# Patient Record
Sex: MALE | Race: WHITE | Employment: FULL TIME | URBAN - METROPOLITAN AREA
[De-identification: names, ages, dates, MRNs, and addresses within clinical notes are randomized per-mention and may not be internally consistent; named-entity substitution may affect disease eponyms.]

---

## 2017-12-13 ENCOUNTER — TELEPHONE (OUTPATIENT)
Dept: FAMILY MEDICINE CLINIC | Facility: CLINIC | Age: 57
End: 2017-12-13

## 2017-12-13 NOTE — TELEPHONE ENCOUNTER
these patients assigned to us in past with [facility], but never seen, and no longer [facility].  Please remove from our list

## 2018-01-23 ENCOUNTER — TELEPHONE (OUTPATIENT)
Dept: FAMILY MEDICINE CLINIC | Facility: CLINIC | Age: 58
End: 2018-01-23

## 2018-01-23 NOTE — TELEPHONE ENCOUNTER
Pt assigned to Dr Henrietta Mera but has never been seen here. Letter mailed for pt to call,confirm and schedule an appointment if Dr Henrietta Mera will be his Primary Care Physician

## 2018-03-28 ENCOUNTER — TELEPHONE (OUTPATIENT)
Dept: FAMILY MEDICINE CLINIC | Facility: CLINIC | Age: 58
End: 2018-03-28

## 2018-03-28 NOTE — TELEPHONE ENCOUNTER
Pt has never been seen here but on assigned to Dr Kenisha Abraham. Eiml Beard LMOM for pt to confirm if she will continue care with Dr Kenisha Abraham (ON ELIGIBILITY LIST)

## 2018-04-13 NOTE — TELEPHONE ENCOUNTER
Front staff called pt and LM on 3/28/18. Called pt again and left message to call our office to schedule an apptment with Dr. Sulaiman Peterson. Will mail letter to pt.

## 2018-06-13 ENCOUNTER — OFFICE VISIT (OUTPATIENT)
Dept: FAMILY MEDICINE CLINIC | Facility: CLINIC | Age: 58
End: 2018-06-13

## 2018-06-13 VITALS
SYSTOLIC BLOOD PRESSURE: 130 MMHG | HEART RATE: 84 BPM | TEMPERATURE: 98 F | HEIGHT: 70 IN | RESPIRATION RATE: 16 BRPM | BODY MASS INDEX: 34.07 KG/M2 | OXYGEN SATURATION: 99 % | DIASTOLIC BLOOD PRESSURE: 94 MMHG | WEIGHT: 238 LBS

## 2018-06-13 DIAGNOSIS — Z13.89 SCREENING FOR GENITOURINARY CONDITION: ICD-10-CM

## 2018-06-13 DIAGNOSIS — E65 HUMP BEHIND THE SHOULDERS: ICD-10-CM

## 2018-06-13 DIAGNOSIS — Z12.5 SCREENING FOR PROSTATE CANCER: ICD-10-CM

## 2018-06-13 DIAGNOSIS — I25.10 CORONARY ARTERY DISEASE INVOLVING NATIVE CORONARY ARTERY OF NATIVE HEART WITHOUT ANGINA PECTORIS: ICD-10-CM

## 2018-06-13 DIAGNOSIS — Z12.11 SCREENING FOR COLON CANCER: ICD-10-CM

## 2018-06-13 DIAGNOSIS — Z00.00 ROUTINE GENERAL MEDICAL EXAMINATION AT HEALTH CARE FACILITY: Primary | ICD-10-CM

## 2018-06-13 PROCEDURE — 90471 IMMUNIZATION ADMIN: CPT | Performed by: FAMILY MEDICINE

## 2018-06-13 PROCEDURE — 99386 PREV VISIT NEW AGE 40-64: CPT | Performed by: FAMILY MEDICINE

## 2018-06-13 PROCEDURE — 90715 TDAP VACCINE 7 YRS/> IM: CPT | Performed by: FAMILY MEDICINE

## 2018-06-13 NOTE — PATIENT INSTRUCTIONS
Do fasting blood work. Call cardiologist Dr. Michelle Schultz for evaluation of Dr. Kay Doe. Call GI Dr. Haim Kumar for colonoscopy. Healthy diet. Stay active.

## 2018-06-14 NOTE — PROGRESS NOTES
Leopold Cobbs is a 60year old male who presents for a complete physical exam.   HPI:   Pt  is new in our office he is here to establish care. Patient's daughter and wife are patient's in our practice. Patient had heart attack in 2004.   Had quadruple b Smokeless tobacco: Never Used                      Alcohol use: No               Occ: . . : yes. Children: 1 daughter.   Exercise: minimal.  Diet: watches fats closely     REVIEW OF SYSTEMS:   GENERAL: feels well otherwise, obese cancer  Screening for prostate cancer  Coronary artery disease involving native coronary artery of native heart without angina pectoris  Hump behind the shoulders      Orders Placed This Encounter      CBC W Differential W Platelet [E]      Comp Metabolic

## 2018-06-23 ENCOUNTER — PRIOR ORIGINAL RECORDS (OUTPATIENT)
Dept: OTHER | Age: 58
End: 2018-06-23

## 2018-06-23 ENCOUNTER — LAB ENCOUNTER (OUTPATIENT)
Dept: LAB | Age: 58
End: 2018-06-23
Attending: FAMILY MEDICINE
Payer: COMMERCIAL

## 2018-06-23 DIAGNOSIS — Z13.89 SCREENING FOR GENITOURINARY CONDITION: ICD-10-CM

## 2018-06-23 DIAGNOSIS — Z12.5 SCREENING FOR PROSTATE CANCER: ICD-10-CM

## 2018-06-23 DIAGNOSIS — E65 HUMP BEHIND THE SHOULDERS: ICD-10-CM

## 2018-06-23 DIAGNOSIS — Z00.00 ROUTINE GENERAL MEDICAL EXAMINATION AT HEALTH CARE FACILITY: ICD-10-CM

## 2018-06-23 DIAGNOSIS — R73.9 HYPERGLYCEMIA: ICD-10-CM

## 2018-06-23 PROCEDURE — 85025 COMPLETE CBC W/AUTO DIFF WBC: CPT

## 2018-06-23 PROCEDURE — 83036 HEMOGLOBIN GLYCOSYLATED A1C: CPT

## 2018-06-23 PROCEDURE — 80053 COMPREHEN METABOLIC PANEL: CPT

## 2018-06-23 PROCEDURE — 84443 ASSAY THYROID STIM HORMONE: CPT

## 2018-06-23 PROCEDURE — 36415 COLL VENOUS BLD VENIPUNCTURE: CPT

## 2018-06-23 PROCEDURE — 82306 VITAMIN D 25 HYDROXY: CPT

## 2018-06-23 PROCEDURE — 80061 LIPID PANEL: CPT

## 2018-06-23 PROCEDURE — 82533 TOTAL CORTISOL: CPT

## 2018-06-23 PROCEDURE — 81001 URINALYSIS AUTO W/SCOPE: CPT

## 2018-06-24 DIAGNOSIS — R73.9 HYPERGLYCEMIA: Primary | ICD-10-CM

## 2018-06-25 ENCOUNTER — TELEPHONE (OUTPATIENT)
Dept: FAMILY MEDICINE CLINIC | Facility: CLINIC | Age: 58
End: 2018-06-25

## 2018-06-25 DIAGNOSIS — E87.5 HYPERKALEMIA: Primary | ICD-10-CM

## 2018-06-27 ENCOUNTER — APPOINTMENT (OUTPATIENT)
Dept: LAB | Age: 58
End: 2018-06-27
Attending: FAMILY MEDICINE
Payer: COMMERCIAL

## 2018-06-27 ENCOUNTER — PRIOR ORIGINAL RECORDS (OUTPATIENT)
Dept: OTHER | Age: 58
End: 2018-06-27

## 2018-06-27 DIAGNOSIS — E87.5 HYPERKALEMIA: ICD-10-CM

## 2018-06-27 LAB
BUN BLD-MCNC: 19 MG/DL (ref 8–20)
CALCIUM BLD-MCNC: 9.5 MG/DL (ref 8.3–10.3)
CHLORIDE: 107 MMOL/L (ref 101–111)
CO2: 27 MMOL/L (ref 22–32)
CREAT BLD-MCNC: 0.89 MG/DL (ref 0.7–1.3)
GLUCOSE BLD-MCNC: 86 MG/DL (ref 70–99)
POTASSIUM SERPL-SCNC: 4.5 MMOL/L (ref 3.6–5.1)
SODIUM SERPL-SCNC: 143 MMOL/L (ref 136–144)

## 2018-06-27 PROCEDURE — 36415 COLL VENOUS BLD VENIPUNCTURE: CPT

## 2018-06-27 PROCEDURE — 80048 BASIC METABOLIC PNL TOTAL CA: CPT

## 2018-07-16 ENCOUNTER — TELEPHONE (OUTPATIENT)
Dept: FAMILY MEDICINE CLINIC | Facility: CLINIC | Age: 58
End: 2018-07-16

## 2018-07-16 DIAGNOSIS — I25.10 ATHEROSCLEROSIS OF NATIVE CORONARY ARTERY WITHOUT ANGINA PECTORIS, UNSPECIFIED WHETHER NATIVE OR TRANSPLANTED HEART: Primary | ICD-10-CM

## 2018-07-16 NOTE — TELEPHONE ENCOUNTER
REFERRAL for Cardio for Dr. Hank Canavan DOB 1960    Reason for the order/referral:Follow Up   PCP: Adin Berkowitz   Refer to [id number] [facility]   Specialty:Cardiologist   Patient Insurance: Payor: [facility] MAYO/WILL / Plan:  [facility] / [employer]

## 2018-07-17 PROCEDURE — 88305 TISSUE EXAM BY PATHOLOGIST: CPT | Performed by: INTERNAL MEDICINE

## 2018-07-18 ENCOUNTER — LAB REQUISITION (OUTPATIENT)
Dept: LAB | Facility: HOSPITAL | Age: 58
End: 2018-07-18
Payer: COMMERCIAL

## 2018-07-18 DIAGNOSIS — D12.5 BENIGN NEOPLASM OF SIGMOID COLON: ICD-10-CM

## 2018-07-18 DIAGNOSIS — D12.3 BENIGN NEOPLASM OF TRANSVERSE COLON: ICD-10-CM

## 2018-07-18 DIAGNOSIS — Z12.11 ENCOUNTER FOR SCREENING FOR MALIGNANT NEOPLASM OF COLON: ICD-10-CM

## 2018-07-25 ENCOUNTER — PRIOR ORIGINAL RECORDS (OUTPATIENT)
Dept: OTHER | Age: 58
End: 2018-07-25

## 2018-07-25 ENCOUNTER — MYAURORA ACCOUNT LINK (OUTPATIENT)
Dept: OTHER | Age: 58
End: 2018-07-25

## 2018-08-07 LAB
ALBUMIN: 3.6 G/DL
ALKALINE PHOSPHATATE(ALK PHOS): 84 IU/L
ALT (SGPT): 43 U/L
AST (SGOT): 25 U/L
BILIRUBIN TOTAL: 0.5 MG/DL
BUN: 18 MG/DL
BUN: 19 MG/DL
CALCIUM: 9.3 MG/DL
CALCIUM: 9.5 MG/DL
CHLORIDE: 107 MEQ/L
CHLORIDE: 111 MEQ/L
CHOLESTEROL, TOTAL: 140 MG/DL
CREATININE, SERUM: 0.87 MG/DL
CREATININE, SERUM: 0.89 MG/DL
GLUCOSE: 110 MG/DL
GLUCOSE: 110 MG/DL
GLUCOSE: 86 MG/DL
HDL CHOLESTEROL: 35 MG/DL
HEMOGLOBIN A1C: 6 %
LDL CHOLESTEROL: 78 MG/DL
POTASSIUM, SERUM: 4.5 MEQ/L
POTASSIUM, SERUM: 5.5 MEQ/L
PROTEIN, TOTAL: 7.3 G/DL
SGOT (AST): 25 IU/L
SGPT (ALT): 43 IU/L
SODIUM: 141 MEQ/L
SODIUM: 143 MEQ/L
THYROID STIMULATING HORMONE: 0.79 MLU/L
TRIGLYCERIDES: 134 MG/DL
VITAMIN D 25-OH: 21.3 NG/ML

## 2019-02-28 VITALS
SYSTOLIC BLOOD PRESSURE: 122 MMHG | DIASTOLIC BLOOD PRESSURE: 70 MMHG | HEART RATE: 90 BPM | WEIGHT: 233 LBS | HEIGHT: 70 IN | BODY MASS INDEX: 33.36 KG/M2

## 2019-04-09 RX ORDER — ROSUVASTATIN CALCIUM 20 MG/1
TABLET, COATED ORAL
COMMUNITY
Start: 2018-07-25 | End: 2020-03-10 | Stop reason: SDUPTHER

## 2019-04-09 RX ORDER — ATORVASTATIN CALCIUM 80 MG/1
1 TABLET, FILM COATED ORAL DAILY
COMMUNITY
Start: 2018-07-25 | End: 2020-03-10 | Stop reason: ALTCHOICE

## 2019-11-06 ENCOUNTER — OFFICE VISIT (OUTPATIENT)
Dept: CARDIOLOGY | Age: 59
End: 2019-11-06

## 2019-11-06 VITALS
BODY MASS INDEX: 32.21 KG/M2 | WEIGHT: 225 LBS | HEART RATE: 70 BPM | SYSTOLIC BLOOD PRESSURE: 120 MMHG | DIASTOLIC BLOOD PRESSURE: 76 MMHG | HEIGHT: 70 IN

## 2019-11-06 DIAGNOSIS — I25.810 CORONARY ARTERY DISEASE INVOLVING CORONARY BYPASS GRAFT OF NATIVE HEART WITHOUT ANGINA PECTORIS: Primary | ICD-10-CM

## 2019-11-06 DIAGNOSIS — Z95.1 HX OF CABG: ICD-10-CM

## 2019-11-06 DIAGNOSIS — E78.2 HYPERLIPIDEMIA, MIXED: ICD-10-CM

## 2019-11-06 PROCEDURE — 99213 OFFICE O/P EST LOW 20 MIN: CPT | Performed by: INTERNAL MEDICINE

## 2019-11-11 ENCOUNTER — MED REC SCAN ONLY (OUTPATIENT)
Dept: FAMILY MEDICINE CLINIC | Facility: CLINIC | Age: 59
End: 2019-11-11

## 2019-11-12 ENCOUNTER — TELEPHONE (OUTPATIENT)
Dept: FAMILY MEDICINE CLINIC | Facility: CLINIC | Age: 59
End: 2019-11-12

## 2019-11-12 DIAGNOSIS — I25.10 ATHEROSCLEROSIS OF NATIVE CORONARY ARTERY WITHOUT ANGINA PECTORIS, UNSPECIFIED WHETHER NATIVE OR TRANSPLANTED HEART: Primary | ICD-10-CM

## 2019-11-12 NOTE — TELEPHONE ENCOUNTER
PT LAST OV ON 6/13/18 AS A NEW PATIENT      Referral to Yobany Owen Cardiologist (INTERNAL)    Reason for the order/referral: referral   PCP:  Dr Jia Zhou   Refer to Provider:  Dr Yobany Owen   Specialty: cardio   Patient Insurance: Payor: [facility]

## 2020-01-14 ENCOUNTER — TELEPHONE (OUTPATIENT)
Dept: CARDIOLOGY | Age: 60
End: 2020-01-14

## 2020-01-14 LAB
ALBUMIN SERPL-MCNC: 3.8 G/DL (ref 3.6–5.1)
ALBUMIN/GLOB SERPL: 1.7 (CALC) (ref 1–2.5)
ALP SERPL-CCNC: 79 U/L (ref 40–115)
ALT SERPL-CCNC: 23 U/L (ref 9–46)
AST SERPL-CCNC: 20 U/L (ref 10–35)
BILIRUB SERPL-MCNC: 0.5 MG/DL (ref 0.2–1.2)
BUN SERPL-MCNC: 19 MG/DL (ref 7–25)
BUN/CREAT SERPL: ABNORMAL (CALC) (ref 6–22)
CALCIUM SERPL-MCNC: 8.8 MG/DL (ref 8.6–10.3)
CHLORIDE SERPL-SCNC: 109 MMOL/L (ref 98–110)
CHOLEST SERPL-MCNC: 117 MG/DL
CHOLEST/HDLC SERPL: 3.1 (CALC)
CO2 SERPL-SCNC: 23 MMOL/L (ref 20–32)
CREAT SERPL-MCNC: 0.75 MG/DL (ref 0.7–1.25)
ERYTHROCYTE [DISTWIDTH] IN BLOOD BY AUTOMATED COUNT: 14 % (ref 11–15)
GFRSERPLBLD MDRD-ARVRAT: 100 ML/MIN/1.73M2
GLOBULIN SER CALC-MCNC: 2.3 G/DL (CALC) (ref 1.9–3.7)
GLUCOSE SERPL-MCNC: 124 MG/DL (ref 65–99)
HBA1C MFR BLD: 5.8 % OF TOTAL HGB
HCT VFR BLD AUTO: 44.8 % (ref 38.5–50)
HDLC SERPL-MCNC: 38 MG/DL
HGB BLD-MCNC: 15.1 G/DL (ref 13.2–17.1)
LDLC SERPL CALC-MCNC: 61 MG/DL (CALC)
MCH RBC QN AUTO: 30 PG (ref 27–33)
MCHC RBC AUTO-ENTMCNC: 33.7 G/DL (ref 32–36)
MCV RBC AUTO: 89.1 FL (ref 80–100)
NONHDLC SERPL-MCNC: 79 MG/DL (CALC)
PLATELET # BLD AUTO: 273 THOUSAND/UL (ref 140–400)
PMV BLD REES-ECKER: 10.7 FL (ref 7.5–12.5)
POTASSIUM SERPL-SCNC: 4.1 MMOL/L (ref 3.5–5.3)
PROT SERPL-MCNC: 6.1 G/DL (ref 6.1–8.1)
RBC # BLD AUTO: 5.03 MILLION/UL (ref 4.2–5.8)
SODIUM SERPL-SCNC: 140 MMOL/L (ref 135–146)
T4 SERPL-MCNC: 7 MCG/DL (ref 4.9–10.5)
TRIGL SERPL-MCNC: 99 MG/DL
TSH SERPL-ACNC: 1.32 MIU/L (ref 0.4–4.5)
WBC # BLD AUTO: 5.5 THOUSAND/UL (ref 3.8–10.8)

## 2020-03-10 ENCOUNTER — TELEPHONE (OUTPATIENT)
Dept: CARDIOLOGY | Age: 60
End: 2020-03-10

## 2020-03-10 RX ORDER — ROSUVASTATIN CALCIUM 20 MG/1
20 TABLET, COATED ORAL DAILY
Qty: 90 TABLET | Refills: 3 | Status: SHIPPED | OUTPATIENT
Start: 2020-03-10

## 2020-10-28 ENCOUNTER — OFFICE VISIT (OUTPATIENT)
Dept: CARDIOLOGY | Age: 60
End: 2020-10-28

## 2020-10-28 VITALS
HEART RATE: 84 BPM | BODY MASS INDEX: 30.8 KG/M2 | DIASTOLIC BLOOD PRESSURE: 84 MMHG | HEIGHT: 71 IN | WEIGHT: 220 LBS | SYSTOLIC BLOOD PRESSURE: 126 MMHG

## 2020-10-28 DIAGNOSIS — Z95.1 HX OF CABG: ICD-10-CM

## 2020-10-28 DIAGNOSIS — E78.2 HYPERLIPIDEMIA, MIXED: ICD-10-CM

## 2020-10-28 DIAGNOSIS — I25.810 CORONARY ARTERY DISEASE INVOLVING CORONARY BYPASS GRAFT OF NATIVE HEART WITHOUT ANGINA PECTORIS: Primary | ICD-10-CM

## 2020-10-28 PROCEDURE — 99214 OFFICE O/P EST MOD 30 MIN: CPT | Performed by: INTERNAL MEDICINE

## 2020-10-28 SDOH — HEALTH STABILITY: PHYSICAL HEALTH: ON AVERAGE, HOW MANY DAYS PER WEEK DO YOU ENGAGE IN MODERATE TO STRENUOUS EXERCISE (LIKE A BRISK WALK)?: 3 DAYS

## 2020-10-28 SDOH — HEALTH STABILITY: PHYSICAL HEALTH: ON AVERAGE, HOW MANY MINUTES DO YOU ENGAGE IN EXERCISE AT THIS LEVEL?: 40 MIN

## 2020-10-28 ASSESSMENT — ENCOUNTER SYMPTOMS
ALLERGIC/IMMUNOLOGIC COMMENTS: NO NEW FOOD ALLERGIES
BRUISES/BLEEDS EASILY: 0
WEIGHT GAIN: 0
NERVOUS/ANXIOUS: 1
SUSPICIOUS LESIONS: 0
CHILLS: 0
HEMOPTYSIS: 0
FEVER: 0
COUGH: 0
HEMATOCHEZIA: 0
WEIGHT LOSS: 0

## 2020-10-29 ENCOUNTER — TELEPHONE (OUTPATIENT)
Dept: CARDIOLOGY | Age: 60
End: 2020-10-29

## 2020-11-04 ENCOUNTER — TELEPHONE (OUTPATIENT)
Dept: FAMILY MEDICINE CLINIC | Facility: CLINIC | Age: 60
End: 2020-11-04

## 2020-11-04 NOTE — TELEPHONE ENCOUNTER
Referral to Marisela Arango, Cardiologist (INTERNAL)    PCP:  Yasir Haq   Refer to Provider: Marisela Arango   Specialty: cardio   Patient Insurance: [facility]   Duration/Summary of Symptoms:   Is this a result of an injury: (Y/N)   Location of pain:

## 2021-04-21 ENCOUNTER — HOSPITAL ENCOUNTER (OUTPATIENT)
Age: 61
Discharge: HOME | End: 2021-04-21
Payer: COMMERCIAL

## 2021-04-21 ENCOUNTER — TELEPHONE (OUTPATIENT)
Dept: FAMILY MEDICINE CLINIC | Facility: CLINIC | Age: 61
End: 2021-04-21

## 2021-04-21 VITALS
HEART RATE: 78 BPM | DIASTOLIC BLOOD PRESSURE: 89 MMHG | OXYGEN SATURATION: 96 % | WEIGHT: 210 LBS | BODY MASS INDEX: 29.4 KG/M2 | RESPIRATION RATE: 16 BRPM | HEIGHT: 70.87 IN | SYSTOLIC BLOOD PRESSURE: 120 MMHG | TEMPERATURE: 97 F

## 2021-04-21 DIAGNOSIS — J03.90 TONSILLITIS: Primary | ICD-10-CM

## 2021-04-21 PROCEDURE — 99203 OFFICE O/P NEW LOW 30 MIN: CPT

## 2021-04-21 PROCEDURE — 99214 OFFICE O/P EST MOD 30 MIN: CPT

## 2021-04-21 PROCEDURE — 87081 CULTURE SCREEN ONLY: CPT | Performed by: NURSE PRACTITIONER

## 2021-04-21 PROCEDURE — 87880 STREP A ASSAY W/OPTIC: CPT | Performed by: NURSE PRACTITIONER

## 2021-04-21 RX ORDER — ASPIRIN 81 MG/1
81 TABLET ORAL DAILY
COMMUNITY

## 2021-04-21 NOTE — ED PROVIDER NOTES
Patient Seen in: [facility]      History   Patient presents with:  Sore Throat    Stated Complaint: Throat & Fatigue    HPI/Subjective:   HPI  78-year-old male presents to immediate care complaining of sore throat feeling tired since [facility] Rate and Rhythm: Normal rate and regular rhythm. Heart sounds: Normal heart sounds. Pulmonary:      Effort: Pulmonary effort is normal.      Breath sounds: Normal breath sounds. Skin:     General: Skin is warm and dry.       Capillary Refill: Capil

## 2021-04-21 NOTE — TELEPHONE ENCOUNTER
Vipin Love N/PSR sts pt just walked in of stating has sore throat, weak, no energy. Following ofc protocol, kamla directed pt to return to his car and advised will have triage nurse call him now. Pt objects stating \"just have the flu. \" kamla pichardo

## 2021-04-24 NOTE — ED NOTES
Attempted to contact the patient but was unsuccessful.  Left a message for the patient to call back RE: Negative strep test.

## 2021-04-24 NOTE — ED NOTES
Patient called back and was notified of the negative strep test. He verbalizes understanding, denies any questions, problems, or concerns, and was in agreement with the information provided.

## 2021-07-22 ENCOUNTER — OFFICE VISIT (OUTPATIENT)
Dept: FAMILY MEDICINE CLINIC | Facility: CLINIC | Age: 61
End: 2021-07-22

## 2021-07-22 VITALS
HEIGHT: 71 IN | RESPIRATION RATE: 17 BRPM | HEART RATE: 90 BPM | DIASTOLIC BLOOD PRESSURE: 68 MMHG | WEIGHT: 229 LBS | SYSTOLIC BLOOD PRESSURE: 120 MMHG | OXYGEN SATURATION: 98 % | TEMPERATURE: 98 F | BODY MASS INDEX: 32.06 KG/M2

## 2021-07-22 DIAGNOSIS — Z13.21 ENCOUNTER FOR VITAMIN DEFICIENCY SCREENING: ICD-10-CM

## 2021-07-22 DIAGNOSIS — Z00.00 LABORATORY EXAMINATION ORDERED AS PART OF A ROUTINE GENERAL MEDICAL EXAMINATION: ICD-10-CM

## 2021-07-22 DIAGNOSIS — Z12.11 SCREENING FOR COLON CANCER: ICD-10-CM

## 2021-07-22 DIAGNOSIS — Z00.00 PHYSICAL EXAM, ANNUAL: Primary | ICD-10-CM

## 2021-07-22 DIAGNOSIS — Z12.5 SCREENING FOR PROSTATE CANCER: ICD-10-CM

## 2021-07-22 DIAGNOSIS — Z11.59 NEED FOR HEPATITIS C SCREENING TEST: ICD-10-CM

## 2021-07-22 DIAGNOSIS — R73.9 HYPERGLYCEMIA: ICD-10-CM

## 2021-07-22 PROCEDURE — 3078F DIAST BP <80 MM HG: CPT | Performed by: FAMILY MEDICINE

## 2021-07-22 PROCEDURE — 99386 PREV VISIT NEW AGE 40-64: CPT | Performed by: FAMILY MEDICINE

## 2021-07-22 PROCEDURE — 3008F BODY MASS INDEX DOCD: CPT | Performed by: FAMILY MEDICINE

## 2021-07-22 PROCEDURE — 3074F SYST BP LT 130 MM HG: CPT | Performed by: FAMILY MEDICINE

## 2021-07-22 NOTE — PATIENT INSTRUCTIONS
Shingrix- dillon shot. Call [phone] to schedule fasting blood work. Schedule colonoscopy with GI doctor. Healthy diet. Stay active.

## 2021-07-22 NOTE — PROGRESS NOTES
Lisbeth Lopez is a 57year old male who presents for a complete physical exam.   HPI:   Pt has no complaints. Needs repeating colonoscopy this year. Patient had heart attack in 2004. Had quadruple bypass. He seen Dr. Ingris Wilkerson.   Was on Crestor and Zet tobacco: Never Used    Vaping Use      Vaping Use: Never used    Alcohol use: No    Drug use: No     Occ: . : yes Children: yes. Exercise: three times per week.   Diet: watches calories closely     REVIEW OF SYSTEMS:   GENERAL: feels we examination  Hyperglycemia  Screening for prostate cancer  Encounter for vitamin deficiency screening  Need for hepatitis c screening test    Orders Placed This Encounter      CBC With Differential With Platelet      Comp Metabolic Panel (14)      Lipid Pa

## 2021-07-24 ENCOUNTER — LAB ENCOUNTER (OUTPATIENT)
Dept: LAB | Age: 61
End: 2021-07-24
Attending: FAMILY MEDICINE
Payer: COMMERCIAL

## 2021-07-24 DIAGNOSIS — E55.9 VITAMIN D DEFICIENCY: ICD-10-CM

## 2021-07-24 DIAGNOSIS — R73.9 HYPERGLYCEMIA: ICD-10-CM

## 2021-07-24 DIAGNOSIS — Z12.5 SCREENING FOR PROSTATE CANCER: ICD-10-CM

## 2021-07-24 DIAGNOSIS — Z11.59 NEED FOR HEPATITIS C SCREENING TEST: ICD-10-CM

## 2021-07-24 DIAGNOSIS — Z00.00 LABORATORY EXAMINATION ORDERED AS PART OF A ROUTINE GENERAL MEDICAL EXAMINATION: ICD-10-CM

## 2021-07-24 DIAGNOSIS — Z13.21 ENCOUNTER FOR VITAMIN DEFICIENCY SCREENING: ICD-10-CM

## 2021-07-24 DIAGNOSIS — R73.9 HYPERGLYCEMIA: Primary | ICD-10-CM

## 2021-07-24 LAB
ALBUMIN SERPL-MCNC: 3.7 G/DL (ref 3.4–5)
ALBUMIN/GLOB SERPL: 1.1 {RATIO} (ref 1–2)
ALP LIVER SERPL-CCNC: 92 U/L
ALT SERPL-CCNC: 38 U/L
ANION GAP SERPL CALC-SCNC: 2 MMOL/L (ref 0–18)
AST SERPL-CCNC: 25 U/L (ref 15–37)
BASOPHILS # BLD AUTO: 0.03 X10(3) UL (ref 0–0.2)
BASOPHILS NFR BLD AUTO: 0.5 %
BILIRUB SERPL-MCNC: 0.5 MG/DL (ref 0.1–2)
BUN BLD-MCNC: 23 MG/DL (ref 7–18)
BUN/CREAT SERPL: 27.7 (ref 10–20)
CALCIUM BLD-MCNC: 8.8 MG/DL (ref 8.5–10.1)
CHLORIDE SERPL-SCNC: 110 MMOL/L (ref 98–112)
CHOLEST SMN-MCNC: 137 MG/DL (ref ?–200)
CO2 SERPL-SCNC: 26 MMOL/L (ref 21–32)
COMPLEXED PSA SERPL-MCNC: 3.55 NG/ML (ref ?–4)
CREAT BLD-MCNC: 0.83 MG/DL
DEPRECATED RDW RBC AUTO: 46.7 FL (ref 35.1–46.3)
EOSINOPHIL # BLD AUTO: 0.09 X10(3) UL (ref 0–0.7)
EOSINOPHIL NFR BLD AUTO: 1.4 %
ERYTHROCYTE [DISTWIDTH] IN BLOOD BY AUTOMATED COUNT: 14.1 % (ref 11–15)
EST. AVERAGE GLUCOSE BLD GHB EST-MCNC: 134 MG/DL (ref 68–126)
GLOBULIN PLAS-MCNC: 3.5 G/DL (ref 2.8–4.4)
GLUCOSE BLD-MCNC: 114 MG/DL (ref 70–99)
HBA1C MFR BLD HPLC: 6.3 % (ref ?–5.7)
HCT VFR BLD AUTO: 48.2 %
HCV AB SERPL QL IA: NONREACTIVE
HDLC SERPL-MCNC: 46 MG/DL (ref 40–59)
HGB BLD-MCNC: 15.2 G/DL
IMM GRANULOCYTES # BLD AUTO: 0.02 X10(3) UL (ref 0–1)
IMM GRANULOCYTES NFR BLD: 0.3 %
LDLC SERPL CALC-MCNC: 74 MG/DL (ref ?–100)
LYMPHOCYTES # BLD AUTO: 2.1 X10(3) UL (ref 1–4)
LYMPHOCYTES NFR BLD AUTO: 33 %
M PROTEIN MFR SERPL ELPH: 7.2 G/DL (ref 6.4–8.2)
MCH RBC QN AUTO: 28.6 PG (ref 26–34)
MCHC RBC AUTO-ENTMCNC: 31.5 G/DL (ref 31–37)
MCV RBC AUTO: 90.6 FL
MONOCYTES # BLD AUTO: 0.77 X10(3) UL (ref 0.1–1)
MONOCYTES NFR BLD AUTO: 12.1 %
NEUTROPHILS # BLD AUTO: 3.35 X10 (3) UL (ref 1.5–7.7)
NEUTROPHILS # BLD AUTO: 3.35 X10(3) UL (ref 1.5–7.7)
NEUTROPHILS NFR BLD AUTO: 52.7 %
NONHDLC SERPL-MCNC: 91 MG/DL (ref ?–130)
OSMOLALITY SERPL CALC.SUM OF ELEC: 291 MOSM/KG (ref 275–295)
PATIENT FASTING Y/N/NP: YES
PATIENT FASTING Y/N/NP: YES
PLATELET # BLD AUTO: 256 10(3)UL (ref 150–450)
POTASSIUM SERPL-SCNC: 4.5 MMOL/L (ref 3.5–5.1)
RBC # BLD AUTO: 5.32 X10(6)UL
SODIUM SERPL-SCNC: 138 MMOL/L (ref 136–145)
TRIGL SERPL-MCNC: 89 MG/DL (ref 30–149)
TSI SER-ACNC: 0.59 MIU/ML (ref 0.36–3.74)
VIT D+METAB SERPL-MCNC: 25.7 NG/ML (ref 30–100)
VLDLC SERPL CALC-MCNC: 14 MG/DL (ref 0–30)
WBC # BLD AUTO: 6.4 X10(3) UL (ref 4–11)

## 2021-07-24 PROCEDURE — 82306 VITAMIN D 25 HYDROXY: CPT

## 2021-07-24 PROCEDURE — 83036 HEMOGLOBIN GLYCOSYLATED A1C: CPT

## 2021-07-24 PROCEDURE — 85025 COMPLETE CBC W/AUTO DIFF WBC: CPT

## 2021-07-24 PROCEDURE — 86803 HEPATITIS C AB TEST: CPT

## 2021-07-24 PROCEDURE — 84443 ASSAY THYROID STIM HORMONE: CPT

## 2021-07-24 PROCEDURE — 80053 COMPREHEN METABOLIC PANEL: CPT

## 2021-07-24 PROCEDURE — 80061 LIPID PANEL: CPT

## 2021-07-24 PROCEDURE — 36415 COLL VENOUS BLD VENIPUNCTURE: CPT

## 2021-07-24 RX ORDER — ERGOCALCIFEROL 1.25 MG/1
50000 CAPSULE ORAL WEEKLY
Qty: 12 CAPSULE | Refills: 0 | Status: SHIPPED | OUTPATIENT
Start: 2021-07-24 | End: 2021-08-23

## 2021-07-26 RX ORDER — ERGOCALCIFEROL 1.25 MG/1
CAPSULE ORAL
Qty: 13 CAPSULE | Refills: 0 | OUTPATIENT
Start: 2021-07-26

## 2021-08-20 ENCOUNTER — LAB ENCOUNTER (OUTPATIENT)
Dept: LAB | Age: 61
End: 2021-08-20
Attending: INTERNAL MEDICINE
Payer: COMMERCIAL

## 2021-08-20 DIAGNOSIS — Z01.818 PRE-OP TESTING: ICD-10-CM

## 2021-08-21 LAB — SARS-COV-2 RNA RESP QL NAA+PROBE: NOT DETECTED

## 2021-08-23 PROBLEM — D12.3 BENIGN NEOPLASM OF TRANSVERSE COLON: Status: ACTIVE | Noted: 2021-08-23

## 2021-08-23 PROBLEM — D12.0 BENIGN NEOPLASM OF CECUM: Status: ACTIVE | Noted: 2021-08-23

## 2021-08-23 PROBLEM — D12.2 BENIGN NEOPLASM OF ASCENDING COLON: Status: ACTIVE | Noted: 2021-08-23

## 2021-08-23 PROBLEM — Z86.0101 HISTORY OF ADENOMATOUS POLYP OF COLON: Status: ACTIVE | Noted: 2021-08-23

## 2021-08-23 PROBLEM — Z86.010 HISTORY OF ADENOMATOUS POLYP OF COLON: Status: ACTIVE | Noted: 2021-08-23

## 2021-08-23 PROCEDURE — 88305 TISSUE EXAM BY PATHOLOGIST: CPT | Performed by: INTERNAL MEDICINE

## 2021-10-11 ENCOUNTER — TELEPHONE (OUTPATIENT)
Dept: FAMILY MEDICINE CLINIC | Facility: CLINIC | Age: 61
End: 2021-10-11

## 2021-10-11 ENCOUNTER — LAB ENCOUNTER (OUTPATIENT)
Dept: LAB | Age: 61
End: 2021-10-11
Attending: FAMILY MEDICINE
Payer: COMMERCIAL

## 2021-10-11 DIAGNOSIS — I25.10 ATHEROSCLEROSIS OF NATIVE CORONARY ARTERY WITHOUT ANGINA PECTORIS, UNSPECIFIED WHETHER NATIVE OR TRANSPLANTED HEART: Primary | ICD-10-CM

## 2021-10-11 DIAGNOSIS — E55.9 VITAMIN D DEFICIENCY: ICD-10-CM

## 2021-10-11 DIAGNOSIS — I25.810 CORONARY ARTERY DISEASE INVOLVING CORONARY BYPASS GRAFT OF NATIVE HEART WITHOUT ANGINA PECTORIS: ICD-10-CM

## 2021-10-11 DIAGNOSIS — R73.9 HYPERGLYCEMIA: ICD-10-CM

## 2021-10-11 PROCEDURE — 82306 VITAMIN D 25 HYDROXY: CPT

## 2021-10-11 PROCEDURE — 80053 COMPREHEN METABOLIC PANEL: CPT

## 2021-10-11 PROCEDURE — 36415 COLL VENOUS BLD VENIPUNCTURE: CPT

## 2021-10-11 PROCEDURE — 83036 HEMOGLOBIN GLYCOSYLATED A1C: CPT

## 2021-10-11 NOTE — TELEPHONE ENCOUNTER
Pt requesting referral from Dr Tara Hardy for cardiologist, Dr Chemo Abdalla. Pt has an appointment scheduled with this doctor for 11/3/21.     Please Advise

## 2021-10-11 NOTE — TELEPHONE ENCOUNTER
Referral pended to Dr.Victor Ramos---cardiology    Chart review indicates pt was referred to  11/13/2019 and has seen MD.    Referral pended

## 2021-10-12 RX ORDER — ERGOCALCIFEROL 1.25 MG/1
CAPSULE ORAL
Qty: 12 CAPSULE | Refills: 0 | OUTPATIENT
Start: 2021-10-12

## 2021-10-12 NOTE — TELEPHONE ENCOUNTER
VITAMIN D2 50,000IU (ERGO) CAP RX    LOV: 7/22/2021    LAST REFILL:  QTY:     UPCOMING APPT: N/A        Please sign if appropriate. Thank You!

## 2021-10-13 DIAGNOSIS — R73.9 HYPERGLYCEMIA: Primary | ICD-10-CM

## 2021-10-21 ENCOUNTER — TELEPHONE (OUTPATIENT)
Dept: CARDIOLOGY | Age: 61
End: 2021-10-21

## 2022-03-08 ENCOUNTER — LAB ENCOUNTER (OUTPATIENT)
Dept: LAB | Age: 62
End: 2022-03-08
Attending: FAMILY MEDICINE
Payer: COMMERCIAL

## 2022-03-08 DIAGNOSIS — R73.9 HYPERGLYCEMIA: ICD-10-CM

## 2022-03-08 LAB
ALBUMIN SERPL-MCNC: 3.6 G/DL (ref 3.4–5)
ALBUMIN/GLOB SERPL: 1.2 {RATIO} (ref 1–2)
ALP LIVER SERPL-CCNC: 91 U/L
ALT SERPL-CCNC: 44 U/L
ANION GAP SERPL CALC-SCNC: 2 MMOL/L (ref 0–18)
AST SERPL-CCNC: 25 U/L (ref 15–37)
BILIRUB SERPL-MCNC: 0.5 MG/DL (ref 0.1–2)
BUN BLD-MCNC: 18 MG/DL (ref 7–18)
CALCIUM BLD-MCNC: 9.2 MG/DL (ref 8.5–10.1)
CHLORIDE SERPL-SCNC: 110 MMOL/L (ref 98–112)
CREAT BLD-MCNC: 0.84 MG/DL
EST. AVERAGE GLUCOSE BLD GHB EST-MCNC: 137 MG/DL (ref 68–126)
FASTING STATUS PATIENT QL REPORTED: YES
GLOBULIN PLAS-MCNC: 3.1 G/DL (ref 2.8–4.4)
GLUCOSE BLD-MCNC: 141 MG/DL (ref 70–99)
HBA1C MFR BLD: 6.4 % (ref ?–5.7)
OSMOLALITY SERPL CALC.SUM OF ELEC: 294 MOSM/KG (ref 275–295)
POTASSIUM SERPL-SCNC: 5.5 MMOL/L (ref 3.5–5.1)
PROT SERPL-MCNC: 6.7 G/DL (ref 6.4–8.2)
SODIUM SERPL-SCNC: 140 MMOL/L (ref 136–145)

## 2022-03-08 PROCEDURE — 80053 COMPREHEN METABOLIC PANEL: CPT

## 2022-03-08 PROCEDURE — 83036 HEMOGLOBIN GLYCOSYLATED A1C: CPT

## 2022-03-10 ENCOUNTER — LAB ENCOUNTER (OUTPATIENT)
Dept: LAB | Age: 62
End: 2022-03-10
Attending: FAMILY MEDICINE
Payer: COMMERCIAL

## 2022-03-10 DIAGNOSIS — E87.5 HYPERKALEMIA: ICD-10-CM

## 2022-03-10 LAB — POTASSIUM SERPL-SCNC: 4.6 MMOL/L (ref 3.5–5.1)

## 2022-03-10 PROCEDURE — 84132 ASSAY OF SERUM POTASSIUM: CPT

## 2023-01-17 ENCOUNTER — MED REC SCAN ONLY (OUTPATIENT)
Dept: FAMILY MEDICINE CLINIC | Facility: CLINIC | Age: 63
End: 2023-01-17

## 2023-06-12 ENCOUNTER — OFFICE VISIT (OUTPATIENT)
Dept: FAMILY MEDICINE CLINIC | Facility: CLINIC | Age: 63
End: 2023-06-12
Payer: COMMERCIAL

## 2023-06-12 VITALS
RESPIRATION RATE: 16 BRPM | HEART RATE: 60 BPM | HEIGHT: 71 IN | BODY MASS INDEX: 33.32 KG/M2 | WEIGHT: 238 LBS | DIASTOLIC BLOOD PRESSURE: 80 MMHG | SYSTOLIC BLOOD PRESSURE: 138 MMHG | TEMPERATURE: 98 F

## 2023-06-12 DIAGNOSIS — R73.9 HYPERGLYCEMIA: ICD-10-CM

## 2023-06-12 DIAGNOSIS — Z00.00 ANNUAL PHYSICAL EXAM: Primary | ICD-10-CM

## 2023-06-12 DIAGNOSIS — I49.9 IRREGULAR HEART BEATS: ICD-10-CM

## 2023-06-12 DIAGNOSIS — Z13.89 SCREENING FOR GENITOURINARY CONDITION: ICD-10-CM

## 2023-06-12 DIAGNOSIS — E55.9 VITAMIN D DEFICIENCY: ICD-10-CM

## 2023-06-12 DIAGNOSIS — Z00.00 LABORATORY EXAMINATION ORDERED AS PART OF A ROUTINE GENERAL MEDICAL EXAMINATION: ICD-10-CM

## 2023-06-12 DIAGNOSIS — N52.9 ERECTILE DYSFUNCTION, UNSPECIFIED ERECTILE DYSFUNCTION TYPE: ICD-10-CM

## 2023-06-12 DIAGNOSIS — F43.9 STRESS: ICD-10-CM

## 2023-06-12 DIAGNOSIS — Z12.5 SCREENING FOR PROSTATE CANCER: ICD-10-CM

## 2023-06-12 LAB
ATRIAL RATE: 79 BPM
P AXIS: 55 DEGREES
P-R INTERVAL: 178 MS
Q-T INTERVAL: 410 MS
QRS DURATION: 96 MS
QTC CALCULATION (BEZET): 470 MS
R AXIS: 4 DEGREES
T AXIS: 41 DEGREES
VENTRICULAR RATE: 79 BPM

## 2023-06-12 RX ORDER — ALPRAZOLAM 0.25 MG/1
0.25 TABLET ORAL 2 TIMES DAILY PRN
Qty: 30 TABLET | Refills: 1 | Status: SHIPPED | OUTPATIENT
Start: 2023-06-12

## 2023-06-12 RX ORDER — ROSUVASTATIN CALCIUM 20 MG/1
TABLET, COATED ORAL
COMMUNITY
Start: 2023-06-04

## 2023-06-12 NOTE — PATIENT INSTRUCTIONS
Call [phone] to schedule fasting labs and urine. Healthy diet. Stay active. Monitor blood pressure at home.

## 2023-07-12 ENCOUNTER — LAB ENCOUNTER (OUTPATIENT)
Dept: LAB | Age: 63
End: 2023-07-12
Attending: FAMILY MEDICINE
Payer: COMMERCIAL

## 2023-07-12 DIAGNOSIS — Z12.5 SCREENING FOR PROSTATE CANCER: ICD-10-CM

## 2023-07-12 DIAGNOSIS — E55.9 VITAMIN D DEFICIENCY: ICD-10-CM

## 2023-07-12 DIAGNOSIS — Z00.00 ANNUAL PHYSICAL EXAM: ICD-10-CM

## 2023-07-12 DIAGNOSIS — Z13.89 SCREENING FOR GENITOURINARY CONDITION: ICD-10-CM

## 2023-07-12 DIAGNOSIS — N52.9 ERECTILE DYSFUNCTION, UNSPECIFIED ERECTILE DYSFUNCTION TYPE: ICD-10-CM

## 2023-07-12 DIAGNOSIS — R73.9 HYPERGLYCEMIA: ICD-10-CM

## 2023-07-12 LAB
ALBUMIN SERPL-MCNC: 3.6 G/DL (ref 3.4–5)
ALBUMIN/GLOB SERPL: 1 {RATIO} (ref 1–2)
ALP LIVER SERPL-CCNC: 74 U/L
ALT SERPL-CCNC: 34 U/L
ANION GAP SERPL CALC-SCNC: 4 MMOL/L (ref 0–18)
AST SERPL-CCNC: 19 U/L (ref 15–37)
BASOPHILS # BLD AUTO: 0.03 X10(3) UL (ref 0–0.2)
BASOPHILS NFR BLD AUTO: 0.5 %
BILIRUB SERPL-MCNC: 0.8 MG/DL (ref 0.1–2)
BILIRUB UR QL STRIP.AUTO: NEGATIVE
BUN BLD-MCNC: 22 MG/DL (ref 7–18)
CALCIUM BLD-MCNC: 9 MG/DL (ref 8.5–10.1)
CHLORIDE SERPL-SCNC: 108 MMOL/L (ref 98–112)
CHOLEST SERPL-MCNC: 134 MG/DL (ref ?–200)
CLARITY UR REFRACT.AUTO: CLEAR
CO2 SERPL-SCNC: 27 MMOL/L (ref 21–32)
COLOR UR AUTO: YELLOW
COMPLEXED PSA SERPL-MCNC: 4.33 NG/ML (ref ?–4)
CREAT BLD-MCNC: 1.12 MG/DL
EOSINOPHIL # BLD AUTO: 0.1 X10(3) UL (ref 0–0.7)
EOSINOPHIL NFR BLD AUTO: 1.6 %
ERYTHROCYTE [DISTWIDTH] IN BLOOD BY AUTOMATED COUNT: 13.7 %
EST. AVERAGE GLUCOSE BLD GHB EST-MCNC: 160 MG/DL (ref 68–126)
FASTING PATIENT LIPID ANSWER: YES
FASTING STATUS PATIENT QL REPORTED: YES
GFR SERPLBLD BASED ON 1.73 SQ M-ARVRAT: 73 ML/MIN/1.73M2 (ref 60–?)
GLOBULIN PLAS-MCNC: 3.7 G/DL (ref 2.8–4.4)
GLUCOSE BLD-MCNC: 144 MG/DL (ref 70–99)
GLUCOSE UR STRIP.AUTO-MCNC: NEGATIVE MG/DL
HBA1C MFR BLD: 7.2 % (ref ?–5.7)
HCT VFR BLD AUTO: 49.2 %
HDLC SERPL-MCNC: 37 MG/DL (ref 40–59)
HGB BLD-MCNC: 16.2 G/DL
IMM GRANULOCYTES # BLD AUTO: 0.02 X10(3) UL (ref 0–1)
IMM GRANULOCYTES NFR BLD: 0.3 %
KETONES UR STRIP.AUTO-MCNC: NEGATIVE MG/DL
LDLC SERPL CALC-MCNC: 80 MG/DL (ref ?–100)
LEUKOCYTE ESTERASE UR QL STRIP.AUTO: NEGATIVE
LYMPHOCYTES # BLD AUTO: 2.19 X10(3) UL (ref 1–4)
LYMPHOCYTES NFR BLD AUTO: 34 %
MCH RBC QN AUTO: 28.1 PG (ref 26–34)
MCHC RBC AUTO-ENTMCNC: 32.9 G/DL (ref 31–37)
MCV RBC AUTO: 85.4 FL
MONOCYTES # BLD AUTO: 0.55 X10(3) UL (ref 0.1–1)
MONOCYTES NFR BLD AUTO: 8.5 %
NEUTROPHILS # BLD AUTO: 3.55 X10 (3) UL (ref 1.5–7.7)
NEUTROPHILS # BLD AUTO: 3.55 X10(3) UL (ref 1.5–7.7)
NEUTROPHILS NFR BLD AUTO: 55.1 %
NITRITE UR QL STRIP.AUTO: NEGATIVE
NONHDLC SERPL-MCNC: 97 MG/DL (ref ?–130)
OSMOLALITY SERPL CALC.SUM OF ELEC: 294 MOSM/KG (ref 275–295)
PH UR STRIP.AUTO: 5 [PH] (ref 5–8)
PLATELET # BLD AUTO: 226 10(3)UL (ref 150–450)
POTASSIUM SERPL-SCNC: 4.4 MMOL/L (ref 3.5–5.1)
PROT SERPL-MCNC: 7.3 G/DL (ref 6.4–8.2)
PROT UR STRIP.AUTO-MCNC: NEGATIVE MG/DL
RBC # BLD AUTO: 5.76 X10(6)UL
SODIUM SERPL-SCNC: 139 MMOL/L (ref 136–145)
SP GR UR STRIP.AUTO: 1.03 (ref 1–1.03)
TRIGL SERPL-MCNC: 85 MG/DL (ref 30–149)
TSI SER-ACNC: 0.86 MIU/ML (ref 0.36–3.74)
UROBILINOGEN UR STRIP.AUTO-MCNC: <2 MG/DL
VIT D+METAB SERPL-MCNC: 32.1 NG/ML (ref 30–100)
VLDLC SERPL CALC-MCNC: 13 MG/DL (ref 0–30)
WBC # BLD AUTO: 6.4 X10(3) UL (ref 4–11)

## 2023-07-12 PROCEDURE — 36415 COLL VENOUS BLD VENIPUNCTURE: CPT

## 2023-07-12 PROCEDURE — 85025 COMPLETE CBC W/AUTO DIFF WBC: CPT

## 2023-07-12 PROCEDURE — 3051F HG A1C>EQUAL 7.0%<8.0%: CPT | Performed by: FAMILY MEDICINE

## 2023-07-12 PROCEDURE — 82306 VITAMIN D 25 HYDROXY: CPT

## 2023-07-12 PROCEDURE — 83036 HEMOGLOBIN GLYCOSYLATED A1C: CPT

## 2023-07-12 PROCEDURE — 80053 COMPREHEN METABOLIC PANEL: CPT

## 2023-07-12 PROCEDURE — 84443 ASSAY THYROID STIM HORMONE: CPT

## 2023-07-12 PROCEDURE — 81001 URINALYSIS AUTO W/SCOPE: CPT

## 2023-07-12 PROCEDURE — 84410 TESTOSTERONE BIOAVAILABLE: CPT

## 2023-07-12 PROCEDURE — 80061 LIPID PANEL: CPT

## 2023-07-13 DIAGNOSIS — R31.9 HEMATURIA, UNSPECIFIED TYPE: Primary | ICD-10-CM

## 2023-07-13 DIAGNOSIS — R97.20 ELEVATED PSA: ICD-10-CM

## 2023-07-17 LAB
SEX HORM BIND GLOB: 43.3 NMOL/L
TESTOST % FREE+WEAK BND: 17.1 %
TESTOST FREE+WEAK BND: 58.2 NG/DL
TESTOSTERONE TOT /MS: 340.1 NG/DL

## 2023-07-25 ENCOUNTER — TELEPHONE (OUTPATIENT)
Dept: FAMILY MEDICINE CLINIC | Facility: CLINIC | Age: 63
End: 2023-07-25

## 2023-07-26 NOTE — TELEPHONE ENCOUNTER
Please offer appointment on Saturday, August 5 at 7:45 in the morning to go over test results. Patient blood work shows hemoglobin A1c in the diabetic range PSA came back elevated. Thank you

## 2023-08-05 ENCOUNTER — OFFICE VISIT (OUTPATIENT)
Dept: FAMILY MEDICINE CLINIC | Facility: CLINIC | Age: 63
End: 2023-08-05
Payer: COMMERCIAL

## 2023-08-05 VITALS
RESPIRATION RATE: 14 BRPM | SYSTOLIC BLOOD PRESSURE: 122 MMHG | WEIGHT: 239 LBS | DIASTOLIC BLOOD PRESSURE: 68 MMHG | BODY MASS INDEX: 33.46 KG/M2 | HEIGHT: 71 IN | HEART RATE: 60 BPM

## 2023-08-05 DIAGNOSIS — E11.9 NEW ONSET TYPE 2 DIABETES MELLITUS (HCC): ICD-10-CM

## 2023-08-05 DIAGNOSIS — I25.810 CORONARY ARTERY DISEASE INVOLVING CORONARY BYPASS GRAFT OF NATIVE HEART WITHOUT ANGINA PECTORIS: ICD-10-CM

## 2023-08-05 DIAGNOSIS — E11.65 UNCONTROLLED TYPE 2 DIABETES MELLITUS WITH HYPERGLYCEMIA (HCC): Primary | ICD-10-CM

## 2023-08-05 DIAGNOSIS — R97.20 ELEVATED PSA MEASUREMENT: ICD-10-CM

## 2023-08-05 PROCEDURE — 3008F BODY MASS INDEX DOCD: CPT | Performed by: FAMILY MEDICINE

## 2023-08-05 PROCEDURE — 3078F DIAST BP <80 MM HG: CPT | Performed by: FAMILY MEDICINE

## 2023-08-05 PROCEDURE — 3074F SYST BP LT 130 MM HG: CPT | Performed by: FAMILY MEDICINE

## 2023-08-05 PROCEDURE — 99215 OFFICE O/P EST HI 40 MIN: CPT | Performed by: FAMILY MEDICINE

## 2023-08-05 NOTE — PATIENT INSTRUCTIONS
Start metformin 500 mg 1 tablet with breakfast.  Get glucometer ,test strips, and lancets from pharmacy and to get for diabetic education. Schedule appointment with diabetic educators. Set up an appointment with ophthalmologist for eye examination. Blood sugar fasting in the morning should be less than 130  Blood sugar 2 hours after meal should be less than 160. Watch diet for sugars and carbohydrates-decrease bread, rice, pasta, potatoes and sweets. Keep good hydration. Lose 15 to 20 pounds. Set up an appointment with urologist.  Geena Wiley blood work in November prior next visit. Set up follow-up appointment in November of this year.

## 2023-08-07 ENCOUNTER — MED REC SCAN ONLY (OUTPATIENT)
Dept: FAMILY MEDICINE CLINIC | Facility: CLINIC | Age: 63
End: 2023-08-07

## 2023-10-18 ENCOUNTER — OFFICE VISIT (OUTPATIENT)
Dept: SURGERY | Facility: CLINIC | Age: 63
End: 2023-10-18

## 2023-10-18 DIAGNOSIS — R97.20 ELEVATED PSA: Primary | ICD-10-CM

## 2023-10-18 LAB
APPEARANCE: CLEAR
BILIRUBIN: NEGATIVE
GLUCOSE (URINE DIPSTICK): NEGATIVE MG/DL
KETONES (URINE DIPSTICK): NEGATIVE MG/DL
LEUKOCYTES: NEGATIVE
MULTISTIX LOT#: ABNORMAL NUMERIC
NITRITE, URINE: NEGATIVE
PH, URINE: 5.5 (ref 4.5–8)
PROTEIN (URINE DIPSTICK): NEGATIVE MG/DL
SPECIFIC GRAVITY: >=1.03 (ref 1–1.03)
URINE-COLOR: YELLOW
UROBILINOGEN,SEMI-QN: 0.2 MG/DL (ref 0–1.9)

## 2023-10-18 PROCEDURE — 81003 URINALYSIS AUTO W/O SCOPE: CPT | Performed by: UROLOGY

## 2023-10-18 PROCEDURE — 99204 OFFICE O/P NEW MOD 45 MIN: CPT | Performed by: UROLOGY

## 2023-10-18 NOTE — PROGRESS NOTES
Urology Clinic Note - New Patient    Referring Provider:  Ezio Parker MD    [facility]     Primary Care Provider:  Ezio Parker MD     Chief Complaint:   Elevated PSA    HPI:   Ladonna Ramos is a 58year old male with history of CAD sp CABG, HLD referred for elevated PSA. Patient has no previous urologic history. He has had routine screening PSAs with his primary. Trend as below. Most recently has been elevated at 4.33 from normal 2 years ago. He has no family history of prostate cancer. He has no recent weight loss or bone pain. History of appendectomy otherwise no intra-abdominal surgeries. He has no significant urinary issues, his AUA symptom score is 5. Erections are adequate. He denies hematuria or dysuria. UA today is negative. PSA:  Lab Results   Component Value Date    PSAS 4.33 (H) 07/12/2023    PSAS 3.55 07/24/2021    PSAS 2.30 06/23/2018            History:     Past Medical History:   Diagnosis Date    Acute, but ill-defined, cerebrovascular disease     Back pain     CAD (coronary artery disease)     2004    Essential hypertension     Hyperlipidemia     Stress     Weight gain        Past Surgical History:   Procedure Laterality Date    APPENDECTOMY      BYPASS SURGERY  2004    x4 , 2004       Family History   Problem Relation Age of Onset    Other (etoh) Father     Cancer Mother         lung ca       Social History     Socioeconomic History    Marital status:    Tobacco Use    Smoking status: Never    Smokeless tobacco: Never   Vaping Use    Vaping Use: Never used   Substance and Sexual Activity    Alcohol use: No    Drug use: No       Medications (Active prior to today's visit):  Current Outpatient Medications   Medication Sig Dispense Refill    rosuvastatin 20 MG Oral Tab       aspirin 81 MG Oral Tab EC Take 1 tablet (81 mg total) by mouth daily.          Allergies:  No Known Allergies      Review of Systems:   A comprehensive 10-point review of systems was completed. Pertinent positives and negatives are noted in the the HPI. Physical Exam:   CONSTITUTIONAL: Well developed, well nourished, in no acute distress  NEUROLOGIC: Alert and oriented  HEAD: Normocephalic, atraumatic  EYES: Sclera non-icteric  ENT: Hearing intact, moist mucous membranes  NECK: No obvious goiter or masses  RESPIRATORY: Normal respiratory effort  SKIN: No evident rashes  ABDOMEN: Soft, non-tender, non-distended,  GE exam deferred per patient    Assessment & Plan:     Dorota Wood is a 58year old male with history of CAD sp CABG, HLD referred for elevated PSA. I discussed PSA screening in detail. PSA screening is our best tool to detect prostate cancer, as prostate cancer typically has no other signs or symptoms. I mentioned that prostate cells produce PSA that gets absorbed into the blood. I discussed that multiple things besides prostate cancer can elevate the PSA, however. These include prostate enlargement, prostate infection or inflammation, recent ejaculation, recent cycling or prostate manipulation, recent urologic procedure or Davies catheter placement. Because of this, we typically do not act on a single elevated PSA and look more at trends in the PSA. For his recent PSA elevation, I recommend rechecking the PSA with addition of the 4K score to provide some additional information. If the PSA remains elevated and/or he has a high risk 4K score I will likely recommend prostate biopsy or MRI. If the 4K score is low and the PSA decreases, then we can discuss rechecking the PSA in 6 months.    -4K Score  - will call with results and based on PSA and 4K will re-discuss prostate biopsy vs. repeat PSA in 6 months    We did briefly discuss prostate biopsy in clinic (discussed benefits and risks, including bleeding in the urine, stool, and semen, as well as infection including possibility of sepsis requiring hospitalization).   We will discuss further pending results of the 4K score    Phone call in 2/3 weeks      Thank you for this consult. I have personally reviewed all relevant medical records, labs, and imaging.       Moy Denton MD  Staff Urologist  [place] [address]  Office: [phone]

## 2023-10-31 ENCOUNTER — LAB ENCOUNTER (OUTPATIENT)
Dept: LAB | Age: 63
End: 2023-10-31
Attending: UROLOGY
Payer: COMMERCIAL

## 2023-10-31 DIAGNOSIS — R97.20 ELEVATED PSA: Primary | ICD-10-CM

## 2023-10-31 PROCEDURE — 36415 COLL VENOUS BLD VENIPUNCTURE: CPT

## 2023-11-08 ENCOUNTER — TELEPHONE (OUTPATIENT)
Dept: SURGERY | Facility: CLINIC | Age: 63
End: 2023-11-08

## 2023-11-19 ENCOUNTER — TELEPHONE (OUTPATIENT)
Dept: FAMILY MEDICINE CLINIC | Facility: CLINIC | Age: 63
End: 2023-11-19

## 2023-11-19 NOTE — TELEPHONE ENCOUNTER
Patient is diabetic. Please ask if he had  eye examination for diabetes completed this year. If eye exam was completed please have him forward those results to our office and let me know. If it was was not completed please remind him to have it done for this year and let us know when it is done.   Thank you

## 2023-11-20 NOTE — TELEPHONE ENCOUNTER
Called Pt and he states that he has not completed diabetic eye exam.  Reminded Pt to schedule an appointment to see eye doctor this year and let us know when it's done. Provided Dr. Gume Celeste information. Pt voiced understanding and agreed with plan of care.

## 2023-12-11 ENCOUNTER — VIRTUAL PHONE E/M (OUTPATIENT)
Dept: SURGERY | Facility: CLINIC | Age: 63
End: 2023-12-11

## 2023-12-11 DIAGNOSIS — R97.20 ELEVATED PSA: Primary | ICD-10-CM

## 2023-12-11 PROCEDURE — 99441 PHONE E/M BY PHYS 5-10 MIN: CPT | Performed by: UROLOGY

## 2023-12-11 NOTE — PROGRESS NOTES
Urology Clinic Note  Telemedicine Visit  Audio Only  The patient consented to performing this visit virtually. Primary Care Provider:  Lavonne Carrion MD     Chief Complaint:     Michael Iyer     HPI:   Susan Thompson is a 58year old male with history of CAD sp CABG, HLD referred for elevated PSA. Patient has no previous urologic history. He has had routine screening PSAs with his primary. Trend as below. Most recently has been elevated at 4.33 from normal 2 years ago. He has no family history of prostate cancer. He has no recent weight loss or bone pain. History of appendectomy otherwise no intra-abdominal surgeries. He has no significant urinary issues, his AUA symptom score is 5. Erections are adequate. He denies hematuria or dysuria. UA was negative. At last visit decision was made to proceed with 4k score;   4k score: 9.8   PSA: 3.98   fPSA: 16    No changes to his health.        PSA:  Lab Results   Component Value Date    PSAS 4.33 (H) 07/12/2023    PSAS 3.55 07/24/2021    PSAS 2.30 06/23/2018        History:     Past Medical History:   Diagnosis Date    Acute, but ill-defined, cerebrovascular disease     Back pain     CAD (coronary artery disease)     2004    Essential hypertension     Hyperlipidemia     Stress     Weight gain        Past Surgical History:   Procedure Laterality Date    APPENDECTOMY      BYPASS SURGERY  2004    x4 , 2004       Family History   Problem Relation Age of Onset    Other (etoh) Father     Cancer Mother         lung ca       Social History     Socioeconomic History    Marital status:    Tobacco Use    Smoking status: Never    Smokeless tobacco: Never   Vaping Use    Vaping Use: Never used   Substance and Sexual Activity    Alcohol use: No    Drug use: No       Medications (Active prior to today's visit):  Current Outpatient Medications   Medication Sig Dispense Refill    rosuvastatin 20 MG Oral Tab       aspirin 81 MG Oral Tab EC Take 1 tablet (81 mg total) by mouth daily. Allergies:  No Known Allergies    Review of Systems:   A comprehensive 10-point review of systems was completed. Pertinent positives and negatives are noted in the the HPI. Physical Exam:   No physical exam performed during this telephone encounter. Assessment & Plan:   Lul Winkler is a 58year old male with history of CAD sp CABG, HLD referred for elevated PSA. I had a long discussion with the patient regarding his elevated PSA level. I explained that PSA (prostate-specific antigen) is a protein that is secreted by prostate cells into the blood stream.  I reviewed the various causes of PSA elevation, both benign and malignant, including prostate cancer, benign prostatic hyperplasia, prostatitis, recent urinary tract instrumentation, urinary infections, and urinary retention. In addition, a number of factors can alter the PSA level over time, including age, medications (such as finasteride), diet, herbal supplements, and of course surgical procedures on the prostate itself. With an elevated PSA level, it is important to rule out prostate cancer as a potential cause. The only way to reliably do this is with a biopsy of the prostate. I explained that this is done under ultrasound guidance using a rectal probe. This allows excellent visualization of the prostate, measurements of the size of the gland, as well as accurate placement of the biospy needle. I emphasized that, although prostate biopsies are good at detecting prostate cancer, it is not 100%. It is subject to sampling error, and there is a possibility of actually missing the area of the prostate with the cancer. I assured the patient that we try to sample the areas that are most likely going to be involved with the cancer (usually at the periphery of the gland).   In cases of a repeat biopsy, we will also sample other areas, such as the transition zone or central areas of the prostate for a more complete assessment. I also discussed the rationale for performing a multiparametric MRI of the prostate in order to better visualize the gland. Any suspicious areas noted on the MRI can then be targeted during the biopsy procedure by means of the [place] MR-US-fusion guided prostate biopsy. This may improve cancer detection rates and may allow for better prognostication. We reviewed his 4k score results; overall intermediate risk with slightly unfavorable free PSA (however this is typically used when PSA is above 4 and PSA currently 3.98). Regardless, patient is interested in moving forward with biopsy but would like the MRI first. We will order this. He will then return via telephone in 3/4 weeks to discuss results before we schedule biopsy (fusion vs standard pending results). In total, 5 minutes were spent on this patient encounter for medical discussion.          Mame Pate MD  Staff Urologist  [facility]  Office: [phone]

## 2023-12-13 ENCOUNTER — TELEPHONE (OUTPATIENT)
Dept: SURGERY | Facility: CLINIC | Age: 63
End: 2023-12-13

## 2023-12-21 ENCOUNTER — MED REC SCAN ONLY (OUTPATIENT)
Dept: FAMILY MEDICINE CLINIC | Facility: CLINIC | Age: 63
End: 2023-12-21

## 2024-01-25 ENCOUNTER — TELEPHONE (OUTPATIENT)
Dept: FAMILY MEDICINE CLINIC | Facility: CLINIC | Age: 64
End: 2024-01-25

## 2024-01-25 NOTE — TELEPHONE ENCOUNTER
Dr Garcia ordered MRI. Central scheduling told PT that they need referral from Primary. Please advise. Thank you.

## 2024-01-25 NOTE — TELEPHONE ENCOUNTER
I called and spoke to the pt.s spouse, Briseida. She called and scheduled an MRI for her  that was ordered by Dr. Garcia from urology. The  told her the pt.s MRI had to be authorized by the PCP because he has an HMO and they were scheduling him March 1 st because the authorization takes time. I called and spoke to Krystal PITTS in referrals to clarify the information and protocol. As long as we gave the pt a referral for the urologist appt the urologist can order the MRI and if the pt is having the MRI at an internal facility no authorization is needed. I called Central scheduling to speak with Holli CABALLERO that scheduled the pt to make sure the pt wasn't scheduled in March to her thinking the test needed authorization by the PCP. Holli CABALLERO was busy. I spoke with a different Holli in Central scheduling. I explained to her the nature of my call. She confirmed they are aware the PCP does not need to authorize the MRI and they are actually scheduling the MRI'S out until March. I called the pt.spouse and informed her there is no authorization needed and the MRI'S are scheduling out through March. Pt.s spouse verbalized understanding.

## 2024-02-01 ENCOUNTER — LAB ENCOUNTER (OUTPATIENT)
Dept: LAB | Age: 64
End: 2024-02-01
Attending: FAMILY MEDICINE
Payer: COMMERCIAL

## 2024-02-01 DIAGNOSIS — R97.20 ELEVATED PSA MEASUREMENT: ICD-10-CM

## 2024-02-01 DIAGNOSIS — E11.65 UNCONTROLLED TYPE 2 DIABETES MELLITUS WITH HYPERGLYCEMIA (HCC): ICD-10-CM

## 2024-02-01 DIAGNOSIS — E11.9 NEW ONSET TYPE 2 DIABETES MELLITUS (HCC): ICD-10-CM

## 2024-02-01 LAB
ALBUMIN SERPL-MCNC: 3.6 G/DL (ref 3.4–5)
ALBUMIN/GLOB SERPL: 1.1 {RATIO} (ref 1–2)
ALP LIVER SERPL-CCNC: 87 U/L
ALT SERPL-CCNC: 43 U/L
ANION GAP SERPL CALC-SCNC: 4 MMOL/L (ref 0–18)
AST SERPL-CCNC: 21 U/L (ref 15–37)
BILIRUB SERPL-MCNC: 0.5 MG/DL (ref 0.1–2)
BUN BLD-MCNC: 20 MG/DL (ref 9–23)
CALCIUM BLD-MCNC: 9.3 MG/DL (ref 8.5–10.1)
CHLORIDE SERPL-SCNC: 112 MMOL/L (ref 98–112)
CHOLEST SERPL-MCNC: 131 MG/DL (ref ?–200)
CO2 SERPL-SCNC: 25 MMOL/L (ref 21–32)
CREAT BLD-MCNC: 0.79 MG/DL
CREAT UR-SCNC: 183 MG/DL
EGFRCR SERPLBLD CKD-EPI 2021: 99 ML/MIN/1.73M2 (ref 60–?)
EST. AVERAGE GLUCOSE BLD GHB EST-MCNC: 157 MG/DL (ref 68–126)
FASTING PATIENT LIPID ANSWER: YES
FASTING STATUS PATIENT QL REPORTED: YES
GLOBULIN PLAS-MCNC: 3.4 G/DL (ref 2.8–4.4)
GLUCOSE BLD-MCNC: 151 MG/DL (ref 70–99)
HBA1C MFR BLD: 7.1 % (ref ?–5.7)
HDLC SERPL-MCNC: 44 MG/DL (ref 40–59)
LDLC SERPL CALC-MCNC: 68 MG/DL (ref ?–100)
MICROALBUMIN UR-MCNC: 4.56 MG/DL
MICROALBUMIN/CREAT 24H UR-RTO: 24.9 UG/MG (ref ?–30)
NONHDLC SERPL-MCNC: 87 MG/DL (ref ?–130)
OSMOLALITY SERPL CALC.SUM OF ELEC: 298 MOSM/KG (ref 275–295)
POTASSIUM SERPL-SCNC: 4.5 MMOL/L (ref 3.5–5.1)
PROT SERPL-MCNC: 7 G/DL (ref 6.4–8.2)
PSA SERPL-MCNC: 4.44 NG/ML (ref ?–4)
SODIUM SERPL-SCNC: 141 MMOL/L (ref 136–145)
TRIGL SERPL-MCNC: 99 MG/DL (ref 30–149)
VLDLC SERPL CALC-MCNC: 15 MG/DL (ref 0–30)

## 2024-02-01 PROCEDURE — 82043 UR ALBUMIN QUANTITATIVE: CPT

## 2024-02-01 PROCEDURE — 80053 COMPREHEN METABOLIC PANEL: CPT

## 2024-02-01 PROCEDURE — 80061 LIPID PANEL: CPT

## 2024-02-01 PROCEDURE — 3051F HG A1C>EQUAL 7.0%<8.0%: CPT | Performed by: FAMILY MEDICINE

## 2024-02-01 PROCEDURE — 83036 HEMOGLOBIN GLYCOSYLATED A1C: CPT

## 2024-02-01 PROCEDURE — 84153 ASSAY OF PSA TOTAL: CPT

## 2024-02-01 PROCEDURE — 82570 ASSAY OF URINE CREATININE: CPT

## 2024-02-01 PROCEDURE — 3061F NEG MICROALBUMINURIA REV: CPT | Performed by: FAMILY MEDICINE

## 2024-03-01 ENCOUNTER — HOSPITAL ENCOUNTER (OUTPATIENT)
Dept: MRI IMAGING | Facility: HOSPITAL | Age: 64
Discharge: HOME | End: 2024-03-01
Attending: UROLOGY
Payer: COMMERCIAL

## 2024-03-01 DIAGNOSIS — R97.20 ELEVATED PSA: ICD-10-CM

## 2024-03-01 PROCEDURE — A9575 INJ GADOTERATE MEGLUMI 0.1ML: HCPCS | Performed by: UROLOGY

## 2024-03-01 PROCEDURE — 72197 MRI PELVIS W/O & W/DYE: CPT | Performed by: UROLOGY

## 2024-03-01 RX ORDER — GADOTERATE MEGLUMINE 376.9 MG/ML
20 INJECTION INTRAVENOUS
Status: COMPLETED | OUTPATIENT
Start: 2024-03-01 | End: 2024-03-01

## 2024-03-01 RX ADMIN — GADOTERATE MEGLUMINE 20 ML: 376.9 INJECTION INTRAVENOUS at 20:57:00

## 2024-03-02 ENCOUNTER — OFFICE VISIT (OUTPATIENT)
Dept: FAMILY MEDICINE CLINIC | Facility: CLINIC | Age: 64
End: 2024-03-02
Payer: COMMERCIAL

## 2024-03-02 VITALS
SYSTOLIC BLOOD PRESSURE: 130 MMHG | BODY MASS INDEX: 32.51 KG/M2 | RESPIRATION RATE: 18 BRPM | DIASTOLIC BLOOD PRESSURE: 70 MMHG | HEART RATE: 50 BPM | HEIGHT: 71 IN | WEIGHT: 232.19 LBS | TEMPERATURE: 97 F

## 2024-03-02 DIAGNOSIS — I25.810 CORONARY ARTERY DISEASE INVOLVING CORONARY BYPASS GRAFT OF NATIVE HEART WITHOUT ANGINA PECTORIS: ICD-10-CM

## 2024-03-02 DIAGNOSIS — Z95.1 S/P CABG (CORONARY ARTERY BYPASS GRAFT): ICD-10-CM

## 2024-03-02 DIAGNOSIS — R97.20 ELEVATED PSA MEASUREMENT: ICD-10-CM

## 2024-03-02 DIAGNOSIS — E11.65 UNCONTROLLED TYPE 2 DIABETES MELLITUS WITH HYPERGLYCEMIA (HCC): Primary | ICD-10-CM

## 2024-03-02 DIAGNOSIS — E78.00 HYPERCHOLESTEROLEMIA: ICD-10-CM

## 2024-03-02 DIAGNOSIS — E11.9 TYPE 2 DIABETES MELLITUS WITHOUT COMPLICATION, WITHOUT LONG-TERM CURRENT USE OF INSULIN (HCC): ICD-10-CM

## 2024-03-02 DIAGNOSIS — E55.9 VITAMIN D DEFICIENCY: ICD-10-CM

## 2024-03-02 DIAGNOSIS — F43.9 STRESS: ICD-10-CM

## 2024-03-02 PROCEDURE — 3075F SYST BP GE 130 - 139MM HG: CPT | Performed by: FAMILY MEDICINE

## 2024-03-02 PROCEDURE — 99214 OFFICE O/P EST MOD 30 MIN: CPT | Performed by: FAMILY MEDICINE

## 2024-03-02 PROCEDURE — 3078F DIAST BP <80 MM HG: CPT | Performed by: FAMILY MEDICINE

## 2024-03-02 PROCEDURE — 3008F BODY MASS INDEX DOCD: CPT | Performed by: FAMILY MEDICINE

## 2024-03-02 RX ORDER — ALPRAZOLAM 0.25 MG/1
0.25 TABLET ORAL 2 TIMES DAILY PRN
Qty: 30 TABLET | Refills: 1 | Status: SHIPPED | OUTPATIENT
Start: 2024-03-02

## 2024-03-02 RX ORDER — ORAL SEMAGLUTIDE 3 MG/1
1 TABLET ORAL DAILY
Qty: 30 TABLET | Refills: 2 | Status: SHIPPED | OUTPATIENT
Start: 2024-03-02 | End: 2024-04-01

## 2024-03-02 NOTE — PATIENT INSTRUCTIONS
Start Rybelsus 3 mg 1 tablet daily before breakfast.  Use alprazolam only as needed for stress.  Watch diet for sugars have less bread, less pasta less rice less potatoes and sweets.   continue working on weight loss.  Watch diet for fats.  Continue rosuvastatin.  Do fasting blood work prior next visit.  Schedule appointment in June 2024.

## 2024-03-02 NOTE — PROGRESS NOTES
Doreen Steiner is a 63 year old male.  Diabetes, stress, hypercholesterolemia, coronary artery disease, elevated PSA  HPI:   Patient is coming for follow-up of diabetes hemoglobin A1c came back at 7.2.  Patient lost some weight.  Watching diet.  He will try to modify diet will start low-dose of Rybelsus 3 mg daily medication was suggested by his daughter.  Patient understands risk of using this medication including pancreatitis and medullary thyroid carcinoma and others.  Recheck blood work in 3 months prior next visit.  Patient eye examination completed.  Denies numbness or tingling of the feet.    Hypercholesterolemia patient is taking rosuvastatin prescribed by cardiologist doing well cholesterol numbers are stable.    Has a lot of stress.  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  Had some problems with his son who is worrying about him.  He would like Xanax prescription to use as needed.    Coronary artery disease status post CABG patient is seeing cardiologist coming up soon.  Doing well asymptomatic no chest pain no shortness of breath no palpitations.    Elevated PSA patient saw urologist, had MRI done results are pending.  Further recommendation pending those results.    Current Outpatient Medications   Medication Sig Dispense Refill   • ALPRAZolam 0.25 MG Oral Tab Take 1 tablet (0.25 mg total) by mouth 2 (two) times daily as needed for Sleep or Anxiety. 30 tablet 1   • Semaglutide (RYBELSUS) 3 MG Oral Tab Take 1 tablet by mouth daily. 30 tablet 2   • rosuvastatin 20 MG Oral Tab      • aspirin 81 MG Oral Tab EC Take 1 tablet (81 mg total) by mouth daily.        Past Medical History:   Diagnosis Date   • Acute, but ill-defined, cerebrovascular disease    • Back pain    • CAD (coronary artery disease)     2004   • Essential hypertension    • Hyperlipidemia    • Stress    • Weight gain       Social History:  Social History     Socioeconomic History   • Marital status:    Tobacco Use   •  Smoking status: Never   • Smokeless tobacco: Never   Vaping Use   • Vaping Use: Never used   Substance and Sexual Activity   • Alcohol use: No   • Drug use: No        REVIEW OF SYSTEMS:   GENERAL HEALTH: feels well otherwise  SKIN: denies any unusual skin lesions or rashes  HEENT no congestion no runny nose no sore throat  Neck no neck pain  RESPIRATORY: denies shortness of breath with exertion  CARDIOVASCULAR: denies chest pain on exertion  GI: denies abdominal pain and denies heartburn  NEURO: denies headaches  Psych normal mood.    EXAM:   /70 (BP Location: Left arm, Patient Position: Sitting, Cuff Size: adult)   Pulse 50   Temp 96.9 °F (36.1 °C) (Temporal)   Resp 18   Ht 5' 11\" (1.803 m)   Wt 232 lb 3.2 oz (105.3 kg)   BMI 32.39 kg/m²   GENERAL: well developed, well nourished,in no apparent distress  SKIN: no rashes,no suspicious lesions  HEENT: atraumatic, normocephalic,ears and throat are clear  NECK: supple,no adenopathy,  LUNGS: clear to auscultation  CARDIO: RRR without murmur  GI: good BS's,no masses, HSM or tenderness  EXTREMITIES: no edema  Bilateral barefoot skin diabetic exam is normal, visualized feet and the appearance is normal except mild osteoarthritis changes.  Bilateral monofilament/sensation of both feet is normal.  Pulsation pedal pulse exam of both lower legs/feet is normal as well.      Psychiatric - alert  and oriented x3, normal mood     Results for orders placed or performed in visit on 02/01/24   Hemoglobin A1C   Result Value Ref Range    HgbA1C 7.1 (H) <5.7 %    Estimated Average Glucose 157 (H) 68 - 126 mg/dL   Lipid Panel   Result Value Ref Range    Cholesterol, Total 131 <200 mg/dL    HDL Cholesterol 44 40 - 59 mg/dL    Triglycerides 99 30 - 149 mg/dL    LDL Cholesterol 68 <100 mg/dL    VLDL 15 0 - 30 mg/dL    Non HDL Chol 87 <130 mg/dL    Patient Fasting for Lipid? Yes    Comp Metabolic Panel (14)   Result Value Ref Range    Glucose 151 (H) 70 - 99 mg/dL    Sodium 141  136 - 145 mmol/L    Potassium 4.5 3.5 - 5.1 mmol/L    Chloride 112 98 - 112 mmol/L    CO2 25.0 21.0 - 32.0 mmol/L    Anion Gap 4 0 - 18 mmol/L    BUN 20 9 - 23 mg/dL    Creatinine 0.79 0.70 - 1.30 mg/dL    Calcium, Total 9.3 8.5 - 10.1 mg/dL    Calculated Osmolality 298 (H) 275 - 295 mOsm/kg    eGFR-Cr 99 >=60 mL/min/1.73m2    AST 21 15 - 37 U/L    ALT 43 16 - 61 U/L    Alkaline Phosphatase 87 45 - 117 U/L    Bilirubin, Total 0.5 0.1 - 2.0 mg/dL    Total Protein 7.0 6.4 - 8.2 g/dL    Albumin 3.6 3.4 - 5.0 g/dL    Globulin  3.4 2.8 - 4.4 g/dL    A/G Ratio 1.1 1.0 - 2.0    Patient Fasting for CMP? Yes    Microalb/Creat Ratio, Random Urine   Result Value Ref Range    Microalbumin, Urine 4.56 mg/dL    Creatinine Ur Random 183.00 mg/dL    Malb/Cre Calc 24.9 <=30.0 ug/mg   PSA Total, Diagnostic   Result Value Ref Range    PSA 4.44 (H) <=4.00 ng/mL     ASSESSMENT AND PLAN:     Encounter Diagnoses   Name Primary?   • Uncontrolled type 2 diabetes mellitus with hyperglycemia (HCC) Yes   • Type 2 diabetes mellitus without complication, without long-term current use of insulin (HCC)    • Stress    • Elevated PSA measurement    • Coronary artery disease involving coronary bypass graft of native heart without angina pectoris    • Vitamin D deficiency    • Hypercholesterolemia    • S/P CABG (coronary artery bypass graft)        Orders Placed This Encounter   Procedures   • Comp Metabolic Panel (14)   • Hemoglobin A1C   • Lipid Panel   • Microalb/Creat Ratio, Random Urine       Meds & Refills for this Visit:  Requested Prescriptions     Signed Prescriptions Disp Refills   • ALPRAZolam 0.25 MG Oral Tab 30 tablet 1     Sig: Take 1 tablet (0.25 mg total) by mouth 2 (two) times daily as needed for Sleep or Anxiety.   • Semaglutide (RYBELSUS) 3 MG Oral Tab 30 tablet 2     Sig: Take 1 tablet by mouth daily.     Start Rybelsus 3 mg 1 tablet daily before breakfast.  Use alprazolam only as needed for stress.  Watch diet for sugars have less  bread, less pasta less rice less potatoes and sweets.   continue working on weight loss.  Watch diet for fats.  Continue rosuvastatin.  Do fasting blood work prior next visit.  Schedule appointment in June 2024.     If Rybelsus is not covered by the insurance will start metformin 500 milligram with breakfast.    Imaging & Consults:  None    The patient indicates understanding of these issues and agrees to the plan.  The patient is asked to return in 3 months.   The note was dictated using speech recognition software.  Accuracy and grammar in transcription may be subject to error..

## 2024-03-05 ENCOUNTER — TELEPHONE (OUTPATIENT)
Dept: FAMILY MEDICINE CLINIC | Facility: CLINIC | Age: 64
End: 2024-03-05

## 2024-03-12 ENCOUNTER — TELEPHONE (OUTPATIENT)
Dept: FAMILY MEDICINE CLINIC | Facility: CLINIC | Age: 64
End: 2024-03-12

## 2024-03-14 ENCOUNTER — VIRTUAL PHONE E/M (OUTPATIENT)
Dept: SURGERY | Facility: CLINIC | Age: 64
End: 2024-03-14

## 2024-03-14 DIAGNOSIS — Z12.5 SCREENING PSA (PROSTATE SPECIFIC ANTIGEN): Primary | ICD-10-CM

## 2024-03-14 PROCEDURE — 99441 PHONE E/M BY PHYS 5-10 MIN: CPT | Performed by: UROLOGY

## 2024-03-14 NOTE — PROGRESS NOTES
Urology Clinic Note  Telemedicine Visit  Audio Only  The patient consented to performing this visit virtually.     Primary Care Provider:  Avis Weeks MD       Offered interpretor, patient states ok without     Chief Complaint:   Elev PSA    HPI:     Doreen Steiner is a 63 year old male with history of CAD sp CABG, HLD referred for elevated PSA.     Patient has no previous urologic history.  He has had routine screening PSAs with his primary.  Trend as below.  Most recently has been elevated at 4.33 from normal 2 years ago.  He has no family history of prostate cancer.  He has no recent weight loss or bone pain.  History of appendectomy otherwise no intra-abdominal surgeries.  He has no significant urinary issues, his AUA symptom score is 5.  Erections are adequate.  He denies hematuria or dysuria.  UA was negative.     At last visit decision was made to proceed with 4k score;   4k score: 9.8   PSA: 3.98   fPSA: 16     Given his above results; a MRI was done; Prostate is 19cc; PIRADS 2 lesion is noted.   No changes to his health. He is doing well today.       PSA:  Lab Results   Component Value Date    PSA 4.44 (H) 02/01/2024    PSAS 4.33 (H) 07/12/2023    PSAS 3.55 07/24/2021    PSAS 2.30 06/23/2018        History:     Past Medical History:   Diagnosis Date    Acute, but ill-defined, cerebrovascular disease     Back pain     CAD (coronary artery disease)     2004    Essential hypertension     Hyperlipidemia     Stress     Weight gain        Past Surgical History:   Procedure Laterality Date    APPENDECTOMY      BYPASS SURGERY  2004    x4 , 2004       Family History   Problem Relation Age of Onset    Other (etoh) Father     Cancer Mother         lung ca       Social History     Socioeconomic History    Marital status:    Tobacco Use    Smoking status: Never    Smokeless tobacco: Never   Vaping Use    Vaping Use: Never used   Substance and Sexual Activity    Alcohol use: No    Drug use: No        Medications (Active prior to today's visit):  Current Outpatient Medications   Medication Sig Dispense Refill    ALPRAZolam 0.25 MG Oral Tab Take 1 tablet (0.25 mg total) by mouth 2 (two) times daily as needed for Sleep or Anxiety. 30 tablet 1    Semaglutide (RYBELSUS) 3 MG Oral Tab Take 1 tablet by mouth daily. 30 tablet 2    rosuvastatin 20 MG Oral Tab       aspirin 81 MG Oral Tab EC Take 1 tablet (81 mg total) by mouth daily.         Allergies:  No Known Allergies    Review of Systems:   A comprehensive 10-point review of systems was completed.  Pertinent positives and negatives are noted in the the HPI.    Physical Exam:   No physical exam performed during this telephone encounter.    Assessment & Plan:   Doreen Steiner is a 63 year old male with history of CAD sp CABG, HLD referred for elevated PSA.    # Had a detailed discussion with the patient regarding his borderline PSA, intermediate risk 4K score, MRI with PI-RADS 2 lesion.  Based on radiology report, very low risk of this being cancerous and likely more inflammatory in nature.  We discussed options including moving forward with biopsy (either standard or UroNav) versus observation.  I did make it clear to the patient that we cannot rule out cancer based on the above, I also told him that his PSA density which is near 0.2 is slightly increased risk.  For now, patient would like to observe.  He will return with a PSA in 4 to 6 months. He will schedule via [facility].     In total, 5 minutes were spent on this patient encounter for medical discussion.         Clayton Garcia MD  Staff Urologist  [facility]  Office: [phone]

## 2024-03-15 ENCOUNTER — TELEPHONE (OUTPATIENT)
Dept: FAMILY MEDICINE CLINIC | Facility: CLINIC | Age: 64
End: 2024-03-15

## 2024-03-15 DIAGNOSIS — E11.9 TYPE 2 DIABETES MELLITUS WITHOUT COMPLICATION, WITHOUT LONG-TERM CURRENT USE OF INSULIN (HCC): Primary | ICD-10-CM

## 2024-03-15 NOTE — TELEPHONE ENCOUNTER
Prior Authorization for Rybelsus 3 mg  was denied by pt's insurance.  Per Dr Bui will try Januvia 50 mg 1 tablet daily.

## 2024-04-26 DIAGNOSIS — E11.9 TYPE 2 DIABETES MELLITUS WITHOUT COMPLICATION, WITHOUT LONG-TERM CURRENT USE OF INSULIN (HCC): ICD-10-CM

## 2024-04-26 RX ORDER — ORAL SEMAGLUTIDE 3 MG/1
1 TABLET ORAL DAILY
Qty: 90 TABLET | Refills: 0 | Status: SHIPPED | OUTPATIENT
Start: 2024-04-26 | End: 2024-04-29 | Stop reason: DRUGHIGH

## 2024-04-29 ENCOUNTER — TELEPHONE (OUTPATIENT)
Dept: FAMILY MEDICINE CLINIC | Facility: CLINIC | Age: 64
End: 2024-04-29

## 2024-04-29 RX ORDER — ORAL SEMAGLUTIDE 7 MG/1
7 TABLET ORAL DAILY
Qty: 30 TABLET | Refills: 0 | Status: SHIPPED | OUTPATIENT
Start: 2024-04-29 | End: 2024-05-29

## 2024-04-29 NOTE — TELEPHONE ENCOUNTER
Received PA from insurance stating that patient can not have more than 30 days of 3mg rybelsus in a 180 day period. After speaking with Dr. Weeks she said it was okay to send prescription of 7mg rybelsus for 30 days. Medication sent.

## 2024-06-26 ENCOUNTER — OFFICE VISIT (OUTPATIENT)
Dept: FAMILY MEDICINE CLINIC | Facility: CLINIC | Age: 64
End: 2024-06-26

## 2024-06-26 VITALS
HEIGHT: 71 IN | TEMPERATURE: 98 F | SYSTOLIC BLOOD PRESSURE: 136 MMHG | RESPIRATION RATE: 16 BRPM | WEIGHT: 232.31 LBS | DIASTOLIC BLOOD PRESSURE: 77 MMHG | BODY MASS INDEX: 32.52 KG/M2 | HEART RATE: 68 BPM

## 2024-06-26 DIAGNOSIS — E78.00 HYPERCHOLESTEROLEMIA: ICD-10-CM

## 2024-06-26 DIAGNOSIS — I25.810 CORONARY ARTERY DISEASE INVOLVING CORONARY BYPASS GRAFT OF NATIVE HEART WITHOUT ANGINA PECTORIS: ICD-10-CM

## 2024-06-26 DIAGNOSIS — Z95.1 S/P CABG (CORONARY ARTERY BYPASS GRAFT): ICD-10-CM

## 2024-06-26 DIAGNOSIS — Z00.00 PHYSICAL EXAM, ANNUAL: Primary | ICD-10-CM

## 2024-06-26 DIAGNOSIS — E11.65 UNCONTROLLED TYPE 2 DIABETES MELLITUS WITH HYPERGLYCEMIA (HCC): ICD-10-CM

## 2024-06-26 DIAGNOSIS — R97.20 ELEVATED PSA MEASUREMENT: ICD-10-CM

## 2024-06-26 PROCEDURE — 99214 OFFICE O/P EST MOD 30 MIN: CPT | Performed by: FAMILY MEDICINE

## 2024-06-26 PROCEDURE — 90471 IMMUNIZATION ADMIN: CPT | Performed by: FAMILY MEDICINE

## 2024-06-26 PROCEDURE — 90750 HZV VACC RECOMBINANT IM: CPT | Performed by: FAMILY MEDICINE

## 2024-06-26 PROCEDURE — 3078F DIAST BP <80 MM HG: CPT | Performed by: FAMILY MEDICINE

## 2024-06-26 PROCEDURE — 3075F SYST BP GE 130 - 139MM HG: CPT | Performed by: FAMILY MEDICINE

## 2024-06-26 PROCEDURE — 3008F BODY MASS INDEX DOCD: CPT | Performed by: FAMILY MEDICINE

## 2024-06-26 PROCEDURE — 99397 PER PM REEVAL EST PAT 65+ YR: CPT | Performed by: FAMILY MEDICINE

## 2024-06-26 NOTE — PATIENT INSTRUCTIONS
Call [phone] to schedule fasting labs.   Healthy diet.  Stay active.   Try to lose some weight.  We will adjust Rybelsus dose after blood test results are back.  Continue other medications.  Follow-up with Dr. Ramos as scheduled.  Set up an appointment with Dr. Garcia urologist as recommended in September.  Further recommendations pending test results.      Dear Patients,  At [facility], patient care is our priority. Please review and acknowledge our office refill policy so we can best serve you.  EMG 36 REFILL POLICY  Allow 3 business days for refills; controlled substances may take longer.  Contact your pharmacy at least 5-7 business days prior to running out of medication and have them send an electronic request or submit through the \"request refill\" option thru your [employer] account. No need to do both, as multiple requests will create an automated [employer] message to notify of a denial for one of the duplicated requests, causing you undue confusion.   Refills are NOT addressed on weekends; covering physicians do not authorize routine medications on weekends.  No narcotics or controlled substances are refilled after noon on Fridays or by on call physicians.  By law, narcotics cannot be faxed or phoned into your pharmacy.  If your prescription is due for a refill, you may be due for a follow up appointment. Please call our office at [phone] to make an appointment or schedule an appointment via [employer].  To best provide you care, patients receiving routine medications need to be seen at least twice a year. Patients receiving narcotic/controlled substance medications need to be seen at least once every 3 months.  In the event that your preferred pharmacy does not have the requested medication in stock (ie Backordered), it is your responsibility to find another pharmacy that has the requested medication available. We will gladly send a new prescription to that pharmacy at your  request.  controlled substances may not be able to be filled out of state due to license restrictions.  If you have a planned trip, it's best to call your pharmacy at least 5-7 business days to prevent any delays in your medication refill.    Thank you for your understanding of this important matter.  Sincerely,    [facility], [address] [place] [id number]  Dept: [phone]

## 2024-06-26 NOTE — PROGRESS NOTES
Doreen Steiner is a 64 year old male who presents for a complete physical exam.  Also follow-up on diabetes coronary artery disease/hypercholesterolemia.  HPI:   Pt is diabetic.  Hemoglobin A1c was 7.1 in March of this year. Does not recheck patient will have a blood work done over the weekend.  He started to take Rybelsus right now takes 7 mg daily doing well with medication no side effects.  Feels much better.  Will make adjustments of the medication after test results are back weight did not came down too much.  We may increase Rybelsus to 14 mg.    Patient had heart attack in 2004.  Had quadruple bypass.  He seen Dr. Mono Betancourt.  Was on Crestor and Zetia in the past.  Patient seen Dr. Ramos.  Has an appointment coming up this year.  Feeling well.  Takes medication.  Cholesterol numbers came back very good.    Patient colonoscopy doing well.  .  Next colonoscopy due this year patient scheduled at the beginning of December for colonoscopy   Patient elevated PSA number.  Had an appointment with urologist he will have another test rechecked in September and see urologist for evaluation will defer prostate examination today.    Vitamin D deficiency check vitamin D referral placed as needed      Immunization History   Administered Date(s) Administered    Covid-19 Vaccine Pfizer 30 mcg/0.3 ml 03/23/2021, 04/10/2021, 12/17/2021    FLUZONE 6 months and older PFS 0.5 ml (28753) 10/26/2021    TDAP 06/13/2018    Zoster Vaccine Recombinant Adjuvanted (Shingrix) 06/12/2023, 06/26/2024     Wt Readings from Last 6 Encounters:   06/26/24 232 lb 4.8 oz (105.4 kg)   03/02/24 232 lb 3.2 oz (105.3 kg)   08/05/23 239 lb (108.4 kg)   06/12/23 238 lb (108 kg)   07/22/21 229 lb (103.9 kg)   04/21/21 210 lb (95.3 kg)     Body mass index is 32.4 kg/m².     Cholesterol, Total (mg/dL)   Date Value   02/01/2024 131   07/12/2023 134   07/24/2021 137     CHOLESTEROL (mg/dL)   Date Value   10/08/2011 98     HDL Cholesterol (mg/dL)   Date  Value   02/01/2024 44   07/12/2023 37 (L)   07/24/2021 46     HDL CHOL (mg/dL)   Date Value   10/08/2011 32 (L)     LDL Cholesterol (mg/dL)   Date Value   02/01/2024 68   07/12/2023 80   07/24/2021 74     LDL CHOLESTROL (mg/dL)   Date Value   10/08/2011 48     AST (U/L)   Date Value   02/01/2024 21   07/12/2023 19   03/08/2022 25   10/08/2011 27     ALT (U/L)   Date Value   02/01/2024 43   07/12/2023 34   03/08/2022 44   10/08/2011 43      PSA (ng/mL)   Date Value   02/01/2024 4.44 (H)     No results found for: \"GLUCOSE\"    Current Outpatient Medications   Medication Sig Dispense Refill    rosuvastatin 20 MG Oral Tab       aspirin 81 MG Oral Tab EC Take 1 tablet (81 mg total) by mouth daily.      PEG 3350-KCl-Na Bicarb-NaCl 420 g Oral Recon Soln Take as directed by physician 4000 mL 0    ALPRAZolam 0.25 MG Oral Tab Take 1 tablet (0.25 mg total) by mouth 2 (two) times daily as needed for Sleep or Anxiety. 30 tablet 1      Past Medical History:    Acute, but ill-defined, cerebrovascular disease    Back pain    CAD (coronary artery disease)    2004    Essential hypertension    Hyperlipidemia    Stress    Weight gain      Past Surgical History:   Procedure Laterality Date    Appendectomy      Bypass surgery  2004    x4 , 2004      Family History   Problem Relation Age of Onset    Other (etoh) Father     Cancer Mother         lung ca      Social History:  Social History     Socioeconomic History    Marital status:    Tobacco Use    Smoking status: Never    Smokeless tobacco: Never   Vaping Use    Vaping status: Never Used   Substance and Sexual Activity    Alcohol use: No    Drug use: No     Social Determinants of Health     Physical Activity: Insufficiently Active (10/28/2020)    [facility] from [facility]    Exercise Vital Sign     Days of Exercise per Week: 3 days     Minutes of Exercise per Session: 40 min      Occ: . : yes Children: yes.   Exercise: three  times per week.  Diet: watches calories closely     REVIEW OF SYSTEMS:   GENERAL: feels well otherwise  SKIN: denies any unusual skin lesions  EYES:denies blurred vision or double vision  HEENT: denies nasal congestion, sinus pain or ST  LUNGS: denies shortness of breath with exertion  CARDIOVASCULAR: denies chest pain on exertion  GI: denies abdominal pain,denies heartburn  : denies nocturia or changes in stream  MUSCULOSKELETAL: denies back pain  NEURO: denies headaches  PSYCHE: denies depression or anxiety  HEMATOLOGIC: denies hx of anemia  ENDOCRINE: denies thyroid history  ALL/ASTHMA: denies hx of allergy or asthma    EXAM:   /77 (BP Location: Left arm, Patient Position: Sitting, Cuff Size: adult)   Pulse 68   Temp 98.2 °F (36.8 °C) (Temporal)   Resp 16   Ht 5' 11\" (1.803 m)   Wt 232 lb 4.8 oz (105.4 kg)   BMI 32.40 kg/m²   Body mass index is 32.4 kg/m².   GENERAL: well developed, well nourished,in no apparent distress  SKIN: no rashes,no suspicious lesions  HEENT: atraumatic, normocephalic,ears  are clear  EYES:PERRLA, EOMI, nconjunctiva are clear  NECK: supple,no adenopathy,  CHEST: no chest tenderness  BREAST: Deferred  LUNGS: clear to auscultation  CARDIO: RRR without murmur  GI: good BS's,no masses, HSM or tenderness  :  deferred deferred  RECTAL: Deferred  MUSCULOSKELETAL: back is not tender,FROM of the back  EXTREMITIES: no cyanosis, clubbing or edema  NEURO: Oriented times three,cranial nerves are intact,motor and sensory are grossly intact    Results for orders placed or performed in visit on 02/01/24   Hemoglobin A1C   Result Value Ref Range    HgbA1C 7.1 (H) <5.7 %    Estimated Average Glucose 157 (H) 68 - 126 mg/dL   Lipid Panel   Result Value Ref Range    Cholesterol, Total 131 <200 mg/dL    HDL Cholesterol 44 40 - 59 mg/dL    Triglycerides 99 30 - 149 mg/dL    LDL Cholesterol 68 <100 mg/dL    VLDL 15 0 - 30 mg/dL    Non HDL Chol 87 <130 mg/dL    Patient Fasting for Lipid? Yes     Comp Metabolic Panel (14)   Result Value Ref Range    Glucose 151 (H) 70 - 99 mg/dL    Sodium 141 136 - 145 mmol/L    Potassium 4.5 3.5 - 5.1 mmol/L    Chloride 112 98 - 112 mmol/L    CO2 25.0 21.0 - 32.0 mmol/L    Anion Gap 4 0 - 18 mmol/L    BUN 20 9 - 23 mg/dL    Creatinine 0.79 0.70 - 1.30 mg/dL    Calcium, Total 9.3 8.5 - 10.1 mg/dL    Calculated Osmolality 298 (H) 275 - 295 mOsm/kg    eGFR-Cr 99 >=60 mL/min/1.73m2    AST 21 15 - 37 U/L    ALT 43 16 - 61 U/L    Alkaline Phosphatase 87 45 - 117 U/L    Bilirubin, Total 0.5 0.1 - 2.0 mg/dL    Total Protein 7.0 6.4 - 8.2 g/dL    Albumin 3.6 3.4 - 5.0 g/dL    Globulin  3.4 2.8 - 4.4 g/dL    A/G Ratio 1.1 1.0 - 2.0    Patient Fasting for CMP? Yes    Microalb/Creat Ratio, Random Urine   Result Value Ref Range    Microalbumin, Urine 4.56 mg/dL    Creatinine Ur Random 183.00 mg/dL    Malb/Cre Calc 24.9 <=30.0 ug/mg   PSA Total, Diagnostic   Result Value Ref Range    PSA 4.44 (H) <=4.00 ng/mL     ASSESSMENT AND PLAN:   Doreen Steiner is a 64 year old male who presents for a complete physical exam.  Encounter Diagnoses   Name Primary?    Physical exam, annual Yes    Uncontrolled type 2 diabetes mellitus with hyperglycemia (HCC)     Elevated PSA measurement     Coronary artery disease involving coronary bypass graft of native heart without angina pectoris     Hypercholesterolemia     S/P CABG (coronary artery bypass graft)        Orders Placed This Encounter   Procedures    CBC With Differential With Platelet    TSH W Reflex To Free T4    Zoster Recombinant Adjuvanted (Shingrix -Shingles) [[id number]]       Meds & Refills for this Visit:  Requested Prescriptions      No prescriptions requested or ordered in this encounter     Call [phone] to schedule fasting labs.   Healthy diet.  Stay active.   Try to lose some weight.  We will adjust Rybelsus dose after blood test results are back.  Continue other medications.  Follow-up with Dr. Ramos as scheduled.  Set up an  appointment with Dr. Garcia urologist as recommended in September.  Further recommendations pending test results.    Imaging & Consults:  ZOSTER VACC RECOMBINANT IM NJX   Pt's weight is Body mass index is 32.4 kg/m²., recommended low carb diet and aerobic exercise 30 minutes three times weekly. Health maintenance, will check fasting Lipids, CMP, CBC and PSA. Pt is up-to-date with screening colonoscopy. The patient indicates understanding of these issues and agrees to the plan.  The patient is asked to return for CPX in 1 year.  Follow-up in the fall for diabetes.  The note was dictated using speech recognition software.  Accuracy and grammar in transcription may be subject to error.

## 2024-06-29 ENCOUNTER — LAB ENCOUNTER (OUTPATIENT)
Dept: LAB | Age: 64
End: 2024-06-29
Attending: FAMILY MEDICINE
Payer: COMMERCIAL

## 2024-06-29 DIAGNOSIS — Z00.00 PHYSICAL EXAM, ANNUAL: ICD-10-CM

## 2024-06-29 DIAGNOSIS — E11.9 TYPE 2 DIABETES MELLITUS WITHOUT COMPLICATION, WITHOUT LONG-TERM CURRENT USE OF INSULIN (HCC): ICD-10-CM

## 2024-06-29 DIAGNOSIS — E78.00 HYPERCHOLESTEROLEMIA: ICD-10-CM

## 2024-06-29 LAB
ALBUMIN SERPL-MCNC: 4.4 G/DL (ref 3.2–4.8)
ALBUMIN/GLOB SERPL: 1.8 {RATIO} (ref 1–2)
ALP LIVER SERPL-CCNC: 77 U/L
ALT SERPL-CCNC: 27 U/L
ANION GAP SERPL CALC-SCNC: 7 MMOL/L (ref 0–18)
AST SERPL-CCNC: 38 U/L (ref ?–34)
BASOPHILS # BLD AUTO: 0.03 X10(3) UL (ref 0–0.2)
BASOPHILS NFR BLD AUTO: 0.5 %
BILIRUB SERPL-MCNC: 0.6 MG/DL (ref 0.2–1.1)
BUN BLD-MCNC: 17 MG/DL (ref 9–23)
BUN/CREAT SERPL: 21.3 (ref 10–20)
CALCIUM BLD-MCNC: 9.2 MG/DL (ref 8.7–10.4)
CHLORIDE SERPL-SCNC: 110 MMOL/L (ref 98–112)
CHOLEST SERPL-MCNC: 110 MG/DL (ref ?–200)
CO2 SERPL-SCNC: 24 MMOL/L (ref 21–32)
CREAT BLD-MCNC: 0.8 MG/DL
CREAT UR-SCNC: 171.3 MG/DL
DEPRECATED RDW RBC AUTO: 44.1 FL (ref 35.1–46.3)
EGFRCR SERPLBLD CKD-EPI 2021: 98 ML/MIN/1.73M2 (ref 60–?)
EOSINOPHIL # BLD AUTO: 0.11 X10(3) UL (ref 0–0.7)
EOSINOPHIL NFR BLD AUTO: 1.8 %
ERYTHROCYTE [DISTWIDTH] IN BLOOD BY AUTOMATED COUNT: 13.9 % (ref 11–15)
EST. AVERAGE GLUCOSE BLD GHB EST-MCNC: 146 MG/DL (ref 68–126)
FASTING PATIENT LIPID ANSWER: NO
FASTING STATUS PATIENT QL REPORTED: NO
GLOBULIN PLAS-MCNC: 2.4 G/DL (ref 2–3.5)
GLUCOSE BLD-MCNC: 160 MG/DL (ref 70–99)
HBA1C MFR BLD: 6.7 % (ref ?–5.7)
HCT VFR BLD AUTO: 46.8 %
HDLC SERPL-MCNC: 38 MG/DL (ref 40–59)
HGB BLD-MCNC: 16.1 G/DL
IMM GRANULOCYTES # BLD AUTO: 0.02 X10(3) UL (ref 0–1)
IMM GRANULOCYTES NFR BLD: 0.3 %
LDLC SERPL CALC-MCNC: 56 MG/DL (ref ?–100)
LYMPHOCYTES # BLD AUTO: 1.97 X10(3) UL (ref 1–4)
LYMPHOCYTES NFR BLD AUTO: 31.4 %
MCH RBC QN AUTO: 29.9 PG (ref 26–34)
MCHC RBC AUTO-ENTMCNC: 34.4 G/DL (ref 31–37)
MCV RBC AUTO: 87 FL
MICROALBUMIN UR-MCNC: 9.7 MG/DL
MICROALBUMIN/CREAT 24H UR-RTO: 56.6 UG/MG (ref ?–30)
MONOCYTES # BLD AUTO: 0.82 X10(3) UL (ref 0.1–1)
MONOCYTES NFR BLD AUTO: 13.1 %
NEUTROPHILS # BLD AUTO: 3.32 X10 (3) UL (ref 1.5–7.7)
NEUTROPHILS # BLD AUTO: 3.32 X10(3) UL (ref 1.5–7.7)
NEUTROPHILS NFR BLD AUTO: 52.9 %
NONHDLC SERPL-MCNC: 72 MG/DL (ref ?–130)
OSMOLALITY SERPL CALC.SUM OF ELEC: 297 MOSM/KG (ref 275–295)
PLATELET # BLD AUTO: 165 10(3)UL (ref 150–450)
POTASSIUM SERPL-SCNC: 4.3 MMOL/L (ref 3.5–5.1)
PROT SERPL-MCNC: 6.8 G/DL (ref 5.7–8.2)
RBC # BLD AUTO: 5.38 X10(6)UL
SODIUM SERPL-SCNC: 141 MMOL/L (ref 136–145)
TRIGL SERPL-MCNC: 79 MG/DL (ref 30–149)
TSI SER-ACNC: 0.97 MIU/ML (ref 0.55–4.78)
VLDLC SERPL CALC-MCNC: 11 MG/DL (ref 0–30)
WBC # BLD AUTO: 6.3 X10(3) UL (ref 4–11)

## 2024-06-29 PROCEDURE — 80053 COMPREHEN METABOLIC PANEL: CPT

## 2024-06-29 PROCEDURE — 82043 UR ALBUMIN QUANTITATIVE: CPT

## 2024-06-29 PROCEDURE — 80061 LIPID PANEL: CPT

## 2024-06-29 PROCEDURE — 85025 COMPLETE CBC W/AUTO DIFF WBC: CPT

## 2024-06-29 PROCEDURE — 83036 HEMOGLOBIN GLYCOSYLATED A1C: CPT

## 2024-06-29 PROCEDURE — 82570 ASSAY OF URINE CREATININE: CPT

## 2024-06-29 PROCEDURE — 84443 ASSAY THYROID STIM HORMONE: CPT

## 2024-06-29 PROCEDURE — 36415 COLL VENOUS BLD VENIPUNCTURE: CPT

## 2024-07-02 DIAGNOSIS — E11.9 TYPE 2 DIABETES MELLITUS WITHOUT COMPLICATION, WITHOUT LONG-TERM CURRENT USE OF INSULIN (HCC): Primary | ICD-10-CM

## 2024-07-02 DIAGNOSIS — I25.10 CORONARY ARTERY DISEASE, UNSPECIFIED VESSEL OR LESION TYPE, UNSPECIFIED WHETHER ANGINA PRESENT, UNSPECIFIED WHETHER NATIVE OR TRANSPLANTED HEART: ICD-10-CM

## 2024-07-02 DIAGNOSIS — E78.00 HYPERCHOLESTEROLEMIA: ICD-10-CM

## 2024-07-02 RX ORDER — ORAL SEMAGLUTIDE 14 MG/1
14 TABLET ORAL DAILY
Qty: 30 TABLET | Refills: 2 | Status: ON HOLD | OUTPATIENT
Start: 2024-07-02 | End: 2024-08-01

## 2024-07-07 ENCOUNTER — HOSPITAL ENCOUNTER (INPATIENT)
Facility: HOSPITAL | Age: 64
LOS: 3 days | Discharge: HOME | End: 2024-07-11
Attending: EMERGENCY MEDICINE | Admitting: HOSPITALIST
Payer: COMMERCIAL

## 2024-07-07 ENCOUNTER — APPOINTMENT (OUTPATIENT)
Dept: GENERAL RADIOLOGY | Facility: HOSPITAL | Age: 64
End: 2024-07-07
Attending: EMERGENCY MEDICINE
Payer: COMMERCIAL

## 2024-07-07 DIAGNOSIS — R07.9 ACUTE CHEST PAIN: ICD-10-CM

## 2024-07-07 DIAGNOSIS — I21.4 NSTEMI (NON-ST ELEVATED MYOCARDIAL INFARCTION) (HCC): Primary | ICD-10-CM

## 2024-07-07 LAB
ALBUMIN SERPL-MCNC: 3.6 G/DL (ref 3.4–5)
ALBUMIN/GLOB SERPL: 1 {RATIO} (ref 1–2)
ALP LIVER SERPL-CCNC: 84 U/L
ALT SERPL-CCNC: 44 U/L
ANION GAP SERPL CALC-SCNC: 6 MMOL/L (ref 0–18)
AST SERPL-CCNC: 30 U/L (ref 15–37)
BASOPHILS # BLD AUTO: 0.03 X10(3) UL (ref 0–0.2)
BASOPHILS NFR BLD AUTO: 0.3 %
BILIRUB SERPL-MCNC: 0.4 MG/DL (ref 0.1–2)
BUN BLD-MCNC: 23 MG/DL (ref 9–23)
CALCIUM BLD-MCNC: 9.2 MG/DL (ref 8.5–10.1)
CHLORIDE SERPL-SCNC: 106 MMOL/L (ref 98–112)
CHOLEST SERPL-MCNC: 117 MG/DL (ref ?–200)
CO2 SERPL-SCNC: 27 MMOL/L (ref 21–32)
CREAT BLD-MCNC: 0.8 MG/DL
EGFRCR SERPLBLD CKD-EPI 2021: 98 ML/MIN/1.73M2 (ref 60–?)
EOSINOPHIL # BLD AUTO: 0.13 X10(3) UL (ref 0–0.7)
EOSINOPHIL NFR BLD AUTO: 1.4 %
ERYTHROCYTE [DISTWIDTH] IN BLOOD BY AUTOMATED COUNT: 13.7 %
GLOBULIN PLAS-MCNC: 3.7 G/DL (ref 2.8–4.4)
GLUCOSE BLD-MCNC: 136 MG/DL (ref 70–99)
HCT VFR BLD AUTO: 45.5 %
HDLC SERPL-MCNC: 39 MG/DL (ref 40–59)
HGB BLD-MCNC: 15.8 G/DL
IMM GRANULOCYTES # BLD AUTO: 0.03 X10(3) UL (ref 0–1)
IMM GRANULOCYTES NFR BLD: 0.3 %
LDLC SERPL CALC-MCNC: 48 MG/DL (ref ?–100)
LYMPHOCYTES # BLD AUTO: 4.56 X10(3) UL (ref 1–4)
LYMPHOCYTES NFR BLD AUTO: 50.6 %
MCH RBC QN AUTO: 29.4 PG (ref 26–34)
MCHC RBC AUTO-ENTMCNC: 34.7 G/DL (ref 31–37)
MCV RBC AUTO: 84.6 FL
MONOCYTES # BLD AUTO: 1.05 X10(3) UL (ref 0.1–1)
MONOCYTES NFR BLD AUTO: 11.7 %
NEUTROPHILS # BLD AUTO: 3.21 X10 (3) UL (ref 1.5–7.7)
NEUTROPHILS # BLD AUTO: 3.21 X10(3) UL (ref 1.5–7.7)
NEUTROPHILS NFR BLD AUTO: 35.7 %
NONHDLC SERPL-MCNC: 78 MG/DL (ref ?–130)
OSMOLALITY SERPL CALC.SUM OF ELEC: 294 MOSM/KG (ref 275–295)
PLATELET # BLD AUTO: 225 10(3)UL (ref 150–450)
POTASSIUM SERPL-SCNC: 3.8 MMOL/L (ref 3.5–5.1)
PROT SERPL-MCNC: 7.3 G/DL (ref 6.4–8.2)
RBC # BLD AUTO: 5.38 X10(6)UL
SODIUM SERPL-SCNC: 139 MMOL/L (ref 136–145)
TRIGL SERPL-MCNC: 184 MG/DL (ref 30–149)
TROPONIN I SERPL HS-MCNC: 114 NG/L
VLDLC SERPL CALC-MCNC: 26 MG/DL (ref 0–30)
WBC # BLD AUTO: 9 X10(3) UL (ref 4–11)

## 2024-07-07 PROCEDURE — 71045 X-RAY EXAM CHEST 1 VIEW: CPT | Performed by: EMERGENCY MEDICINE

## 2024-07-07 PROCEDURE — 99223 1ST HOSP IP/OBS HIGH 75: CPT | Performed by: HOSPITALIST

## 2024-07-07 RX ORDER — LIDOCAINE HYDROCHLORIDE 20 MG/ML
10 SOLUTION OROPHARYNGEAL ONCE
Status: COMPLETED | OUTPATIENT
Start: 2024-07-07 | End: 2024-07-07

## 2024-07-07 RX ORDER — MORPHINE SULFATE 4 MG/ML
4 INJECTION, SOLUTION INTRAMUSCULAR; INTRAVENOUS ONCE
Status: COMPLETED | OUTPATIENT
Start: 2024-07-07 | End: 2024-07-08

## 2024-07-07 RX ORDER — ASPIRIN 81 MG/1
243 TABLET, CHEWABLE ORAL ONCE
Status: COMPLETED | OUTPATIENT
Start: 2024-07-07 | End: 2024-07-07

## 2024-07-07 RX ORDER — ONDANSETRON 2 MG/ML
4 INJECTION INTRAMUSCULAR; INTRAVENOUS ONCE
Status: COMPLETED | OUTPATIENT
Start: 2024-07-07 | End: 2024-07-07

## 2024-07-07 RX ORDER — METOPROLOL TARTRATE 1 MG/ML
5 INJECTION, SOLUTION INTRAVENOUS ONCE
Status: COMPLETED | OUTPATIENT
Start: 2024-07-07 | End: 2024-07-07

## 2024-07-07 RX ORDER — MAGNESIUM HYDROXIDE/ALUMINUM HYDROXICE/SIMETHICONE 120; 1200; 1200 MG/30ML; MG/30ML; MG/30ML
30 SUSPENSION ORAL ONCE
Status: COMPLETED | OUTPATIENT
Start: 2024-07-07 | End: 2024-07-07

## 2024-07-08 ENCOUNTER — APPOINTMENT (OUTPATIENT)
Dept: CV DIAGNOSTICS | Facility: HOSPITAL | Age: 64
End: 2024-07-08
Attending: HOSPITALIST
Payer: COMMERCIAL

## 2024-07-08 ENCOUNTER — APPOINTMENT (OUTPATIENT)
Dept: INTERVENTIONAL RADIOLOGY/VASCULAR | Facility: HOSPITAL | Age: 64
End: 2024-07-08
Payer: COMMERCIAL

## 2024-07-08 PROBLEM — I21.4 NSTEMI (NON-ST ELEVATED MYOCARDIAL INFARCTION) (HCC): Status: ACTIVE | Noted: 2024-01-01

## 2024-07-08 PROBLEM — R07.9 ACUTE CHEST PAIN: Status: ACTIVE | Noted: 2024-07-08

## 2024-07-08 PROBLEM — I21.4 NSTEMI (NON-ST ELEVATED MYOCARDIAL INFARCTION) (HCC): Status: ACTIVE | Noted: 2024-07-08

## 2024-07-08 PROBLEM — R07.9 ACUTE CHEST PAIN: Status: ACTIVE | Noted: 2024-01-01

## 2024-07-08 LAB
ANION GAP SERPL CALC-SCNC: 6 MMOL/L (ref 0–18)
APTT PPP: 22.1 SECONDS (ref 23–36)
APTT PPP: 46.4 SECONDS (ref 23–36)
ATRIAL RATE: 88 BPM
BASOPHILS # BLD AUTO: 0.02 X10(3) UL (ref 0–0.2)
BASOPHILS NFR BLD AUTO: 0.2 %
BUN BLD-MCNC: 24 MG/DL (ref 9–23)
CALCIUM BLD-MCNC: 8.9 MG/DL (ref 8.5–10.1)
CHLORIDE SERPL-SCNC: 108 MMOL/L (ref 98–112)
CO2 SERPL-SCNC: 25 MMOL/L (ref 21–32)
CREAT BLD-MCNC: 0.81 MG/DL
EGFRCR SERPLBLD CKD-EPI 2021: 98 ML/MIN/1.73M2 (ref 60–?)
EOSINOPHIL # BLD AUTO: 0.01 X10(3) UL (ref 0–0.7)
EOSINOPHIL NFR BLD AUTO: 0.1 %
ERYTHROCYTE [DISTWIDTH] IN BLOOD BY AUTOMATED COUNT: 13.6 %
GLUCOSE BLD-MCNC: 159 MG/DL (ref 70–99)
HCT VFR BLD AUTO: 43.3 %
HGB BLD-MCNC: 14.5 G/DL
IMM GRANULOCYTES # BLD AUTO: 0.04 X10(3) UL (ref 0–1)
IMM GRANULOCYTES NFR BLD: 0.4 %
ISTAT ACTIVATED CLOTTING TIME: 104 SECONDS (ref 74–137)
LYMPHOCYTES # BLD AUTO: 2.18 X10(3) UL (ref 1–4)
LYMPHOCYTES NFR BLD AUTO: 23.7 %
MCH RBC QN AUTO: 29.2 PG (ref 26–34)
MCHC RBC AUTO-ENTMCNC: 33.5 G/DL (ref 31–37)
MCV RBC AUTO: 87.3 FL
MONOCYTES # BLD AUTO: 0.73 X10(3) UL (ref 0.1–1)
MONOCYTES NFR BLD AUTO: 7.9 %
NEUTROPHILS # BLD AUTO: 6.23 X10 (3) UL (ref 1.5–7.7)
NEUTROPHILS # BLD AUTO: 6.23 X10(3) UL (ref 1.5–7.7)
NEUTROPHILS NFR BLD AUTO: 67.7 %
OSMOLALITY SERPL CALC.SUM OF ELEC: 295 MOSM/KG (ref 275–295)
P AXIS: 61 DEGREES
P-R INTERVAL: 182 MS
PLATELET # BLD AUTO: 213 10(3)UL (ref 150–450)
POTASSIUM SERPL-SCNC: 4.6 MMOL/L (ref 3.5–5.1)
Q-T INTERVAL: 380 MS
QRS DURATION: 108 MS
QTC CALCULATION (BEZET): 459 MS
R AXIS: 18 DEGREES
RBC # BLD AUTO: 4.96 X10(6)UL
SODIUM SERPL-SCNC: 139 MMOL/L (ref 136–145)
T AXIS: 70 DEGREES
TROPONIN I SERPL HS-MCNC: 760 NG/L
TROPONIN I SERPL HS-MCNC: ABNORMAL NG/L
VENTRICULAR RATE: 88 BPM
WBC # BLD AUTO: 9.2 X10(3) UL (ref 4–11)

## 2024-07-08 PROCEDURE — 99232 SBSQ HOSP IP/OBS MODERATE 35: CPT | Performed by: STUDENT IN AN ORGANIZED HEALTH CARE EDUCATION/TRAINING PROGRAM

## 2024-07-08 PROCEDURE — 4A023N7 MEASUREMENT OF CARDIAC SAMPLING AND PRESSURE, LEFT HEART, PERCUTANEOUS APPROACH: ICD-10-PCS | Performed by: INTERNAL MEDICINE

## 2024-07-08 PROCEDURE — B2111ZZ FLUOROSCOPY OF MULTIPLE CORONARY ARTERIES USING LOW OSMOLAR CONTRAST: ICD-10-PCS | Performed by: INTERNAL MEDICINE

## 2024-07-08 PROCEDURE — B2131ZZ FLUOROSCOPY OF MULTIPLE CORONARY ARTERY BYPASS GRAFTS USING LOW OSMOLAR CONTRAST: ICD-10-PCS | Performed by: INTERNAL MEDICINE

## 2024-07-08 PROCEDURE — 93306 TTE W/DOPPLER COMPLETE: CPT | Performed by: HOSPITALIST

## 2024-07-08 RX ORDER — ONDANSETRON 2 MG/ML
4 INJECTION INTRAMUSCULAR; INTRAVENOUS EVERY 6 HOURS PRN
Status: DISCONTINUED | OUTPATIENT
Start: 2024-07-08 | End: 2024-07-11

## 2024-07-08 RX ORDER — HYDROMORPHONE HYDROCHLORIDE 1 MG/ML
1 INJECTION, SOLUTION INTRAMUSCULAR; INTRAVENOUS; SUBCUTANEOUS ONCE
Status: COMPLETED | OUTPATIENT
Start: 2024-07-08 | End: 2024-07-08

## 2024-07-08 RX ORDER — LIDOCAINE HYDROCHLORIDE 10 MG/ML
INJECTION, SOLUTION EPIDURAL; INFILTRATION; INTRACAUDAL; PERINEURAL
Status: COMPLETED
Start: 2024-07-08 | End: 2024-07-08

## 2024-07-08 RX ORDER — SODIUM CHLORIDE 9 MG/ML
INJECTION, SOLUTION INTRAVENOUS
Status: DISCONTINUED | OUTPATIENT
Start: 2024-07-09 | End: 2024-07-08 | Stop reason: HOSPADM

## 2024-07-08 RX ORDER — BISACODYL 10 MG
10 SUPPOSITORY, RECTAL RECTAL
Status: DISCONTINUED | OUTPATIENT
Start: 2024-07-08 | End: 2024-07-11

## 2024-07-08 RX ORDER — ACETAMINOPHEN 325 MG/1
650 TABLET ORAL EVERY 4 HOURS PRN
Status: DISCONTINUED | OUTPATIENT
Start: 2024-07-08 | End: 2024-07-11

## 2024-07-08 RX ORDER — HEPARIN SODIUM 5000 [USP'U]/ML
INJECTION, SOLUTION INTRAVENOUS; SUBCUTANEOUS
Status: COMPLETED
Start: 2024-07-08 | End: 2024-07-08

## 2024-07-08 RX ORDER — ASPIRIN 81 MG/1
81 TABLET ORAL DAILY
Status: DISCONTINUED | OUTPATIENT
Start: 2024-07-08 | End: 2024-07-10

## 2024-07-08 RX ORDER — SENNOSIDES 8.6 MG
17.2 TABLET ORAL NIGHTLY PRN
Status: DISCONTINUED | OUTPATIENT
Start: 2024-07-08 | End: 2024-07-11

## 2024-07-08 RX ORDER — ENEMA 19; 7 G/133ML; G/133ML
1 ENEMA RECTAL ONCE AS NEEDED
Status: DISCONTINUED | OUTPATIENT
Start: 2024-07-08 | End: 2024-07-11

## 2024-07-08 RX ORDER — MIDAZOLAM HYDROCHLORIDE 1 MG/ML
INJECTION INTRAMUSCULAR; INTRAVENOUS
Status: COMPLETED
Start: 2024-07-08 | End: 2024-07-08

## 2024-07-08 RX ORDER — EPTIFIBATIDE 0.75 MG/ML
2 INJECTION, SOLUTION INTRAVENOUS CONTINUOUS
Status: DISCONTINUED | OUTPATIENT
Start: 2024-07-08 | End: 2024-07-10

## 2024-07-08 RX ORDER — HEPARIN SODIUM AND DEXTROSE 10000; 5 [USP'U]/100ML; G/100ML
1000 INJECTION INTRAVENOUS ONCE
Status: COMPLETED | OUTPATIENT
Start: 2024-07-08 | End: 2024-07-08

## 2024-07-08 RX ORDER — MELATONIN
3 NIGHTLY PRN
Status: DISCONTINUED | OUTPATIENT
Start: 2024-07-08 | End: 2024-07-11

## 2024-07-08 RX ORDER — METOCLOPRAMIDE HYDROCHLORIDE 5 MG/ML
10 INJECTION INTRAMUSCULAR; INTRAVENOUS EVERY 8 HOURS PRN
Status: DISCONTINUED | OUTPATIENT
Start: 2024-07-08 | End: 2024-07-11

## 2024-07-08 RX ORDER — EPTIFIBATIDE 0.75 MG/ML
INJECTION, SOLUTION INTRAVENOUS
Status: COMPLETED
Start: 2024-07-08 | End: 2024-07-08

## 2024-07-08 RX ORDER — ROSUVASTATIN CALCIUM 20 MG/1
20 TABLET, COATED ORAL NIGHTLY
Status: DISCONTINUED | OUTPATIENT
Start: 2024-07-08 | End: 2024-07-11

## 2024-07-08 RX ORDER — ACETAMINOPHEN AND CODEINE PHOSPHATE 300; 30 MG/1; MG/1
2 TABLET ORAL EVERY 4 HOURS PRN
Status: DISCONTINUED | OUTPATIENT
Start: 2024-07-08 | End: 2024-07-11

## 2024-07-08 RX ORDER — HEPARIN SODIUM AND DEXTROSE 10000; 5 [USP'U]/100ML; G/100ML
INJECTION INTRAVENOUS CONTINUOUS
Status: DISCONTINUED | OUTPATIENT
Start: 2024-07-08 | End: 2024-07-08

## 2024-07-08 RX ORDER — ENOXAPARIN SODIUM 100 MG/ML
40 INJECTION SUBCUTANEOUS DAILY
Status: DISCONTINUED | OUTPATIENT
Start: 2024-07-08 | End: 2024-07-08

## 2024-07-08 RX ORDER — HEPARIN SODIUM 1000 [USP'U]/ML
5000 INJECTION, SOLUTION INTRAVENOUS; SUBCUTANEOUS ONCE
Status: COMPLETED | OUTPATIENT
Start: 2024-07-08 | End: 2024-07-08

## 2024-07-08 RX ORDER — ASPIRIN 81 MG/1
324 TABLET, CHEWABLE ORAL ONCE
Status: COMPLETED | OUTPATIENT
Start: 2024-07-08 | End: 2024-07-08

## 2024-07-08 RX ORDER — METOPROLOL SUCCINATE 25 MG/1
25 TABLET, EXTENDED RELEASE ORAL
Status: DISCONTINUED | OUTPATIENT
Start: 2024-07-08 | End: 2024-07-11

## 2024-07-08 RX ORDER — ACETAMINOPHEN 500 MG
500 TABLET ORAL EVERY 4 HOURS PRN
Status: DISCONTINUED | OUTPATIENT
Start: 2024-07-08 | End: 2024-07-11

## 2024-07-08 RX ORDER — ACETAMINOPHEN AND CODEINE PHOSPHATE 300; 30 MG/1; MG/1
1 TABLET ORAL EVERY 4 HOURS PRN
Status: DISCONTINUED | OUTPATIENT
Start: 2024-07-08 | End: 2024-07-11

## 2024-07-08 RX ORDER — POLYETHYLENE GLYCOL 3350 17 G/17G
17 POWDER, FOR SOLUTION ORAL DAILY PRN
Status: DISCONTINUED | OUTPATIENT
Start: 2024-07-08 | End: 2024-07-11

## 2024-07-08 NOTE — PLAN OF CARE
Assumed care of patient at 0730.  Alert and oriented x4.   On room air with adequate O2 saturations.   NSR on tele with frequent-bigeminal PVCs. Denies chest pain presently. Heparin gtt per ACS/Afib protocol.  Continent of bowel and bladder.  Up SBA d/t IV pole, steady gait.   Patient updated on plan of care, verbalized understanding. Needs met at this time. Family updated on plan of care known up to this point.    Received patient back from cath lab at approx 1330, R groin site clean,dry,intact and soft with no hematoma. VSS. Integrelin running per order.      Problem: Patient/Family Goals  Goal: Patient/Family Long Term Goal  Description: Patient's Long Term Goal: Return to baseline    Interventions:  - Discharge home with completed treatment  - See additional Care Plan goals for specific interventions  Outcome: Progressing  Goal: Patient/Family Short Term Goal  Description: Patient's Short Term Goal: Experience no complaint of chest pain    Interventions:   - Monitor troponin levels  - Consulted with cardiology  - See additional Care Plan goals for specific interventions  Outcome: Progressing

## 2024-07-08 NOTE — ED QUICK NOTES
Orders for admission, patient is aware of plan and ready to go upstairs. Any questions, please call ED RN Yoon at extension [phone].     Patient Covid vaccination status: Fully vaccinated     COVID Test Ordered in ED: None    COVID Suspicion at Admission: N/A    Running Infusions:  None    Mental Status/LOC at time of transport: a/ox4    Other pertinent information:   CIWA score: N/A   NIH score:  N/A

## 2024-07-08 NOTE — H&P
[facility]     Doreen Steiner Patient Status:  Emergency    1960 MRN [id number]   Location [facility] EMERGENCY DEPARTMENT Attending Ana Fraser DO   Hosp Day # 0 PCP Avis Weeks MD     Chief Complaint: chest pain    Subjective:    History of Present Illness:     Doreen Steiner is a 64 year old male with history of coronary disease status post 3 vessel CABG , hyperlipidemia next emergency room with chest pain that started this evening that woke him up from sleep.    History/Other:    Past Medical History:  Past Medical History:    Acute, but ill-defined, cerebrovascular disease    Back pain    CAD (coronary artery disease)        Essential hypertension    Hyperlipidemia    Stress    Weight gain     Past Surgical History:   Past Surgical History:   Procedure Laterality Date    Appendectomy      Bypass surgery  2004    x4 , 2004      Family History:   Family History   Problem Relation Age of Onset    Other (etoh) Father     Cancer Mother         lung ca     Social History:    reports that he has never smoked. He has never used smokeless tobacco. He reports that he does not drink alcohol and does not use drugs.     Allergies: No Known Allergies    Medications:    No current facility-administered medications on file prior to encounter.     Current Outpatient Medications on File Prior to Encounter   Medication Sig Dispense Refill    Semaglutide (RYBELSUS) 14 MG Oral Tab Take 14 mg by mouth daily. 30 tablet 2    rosuvastatin 20 MG Oral Tab       aspirin 81 MG Oral Tab EC Take 1 tablet (81 mg total) by mouth daily.      PEG 3350-KCl-Na Bicarb-NaCl 420 g Oral Recon Soln Take as directed by physician (Patient not taking: Reported on 2024) 4000 mL 0    [] Semaglutide (RYBELSUS) 7 MG Oral Tab Take 7 mg by mouth daily. 30 tablet 0    ALPRAZolam 0.25 MG Oral Tab Take 1 tablet (0.25 mg total) by mouth 2 (two) times daily as needed for Sleep or Anxiety. (Patient  not taking: Reported on 7/7/2024) 30 tablet 1       Review of Systems:   A comprehensive review of systems was completed.    Pertinent positives and negatives noted in the HPI.    Objective:   Physical Exam:    /89   Pulse 77   Temp 98.2 °F (36.8 °C)   Resp 18   Wt 232 lb (105.2 kg)   SpO2 98%   BMI 32.36 kg/m²   General: No acute distress, Alert  Respiratory: No rhonchi, no wheezes  Cardiovascular: S1, S2. Regular rate and rhythm  Abdomen: Soft, Non-tender, non-distended, positive bowel sounds  Neuro: No new focal deficits  Extremities: No edema      Results:    Labs:      Labs Last 24 Hours:    Recent Labs   Lab 07/07/24 2256   RBC 5.38   HGB 15.8   HCT 45.5   MCV 84.6   MCH 29.4   MCHC 34.7   RDW 13.7   NEPRELIM 3.21   WBC 9.0   .0       Recent Labs   Lab 07/07/24 2256   *   BUN 23   CREATSERUM 0.80   EGFRCR 98   CA 9.2   ALB 3.6      K 3.8      CO2 27.0   ALKPHO 84   AST 30   ALT 44   BILT 0.4   TP 7.3       No results found for: \"PT\", \"INR\"    Recent Labs   Lab 07/07/24 2256   TROPHS 114*       No results for input(s): \"TROP\", \"PBNP\" in the last 168 hours.    No results for input(s): \"PCT\" in the last 168 hours.    Imaging: Imaging data reviewed in Epic.    Assessment & Plan:      # NSTEMI  - Will trend troponins  - Echo in the am  - Hx CABG 2004  - Cardiology on consult.    # CAD   S/P CABG  -Will continue on aspirin and statin.    # Hyperlipidemia  - Will continue on  statin  therapy.    Plan of care discussed with patient at bedside    Richardson Tobar,     Supplementary Documentation:     The 21st Century Cures Act makes medical notes like these available to patients in the interest of transparency. Please be advised this is a medical document. Medical documents are intended to carry relevant information, facts as evident, and the clinical opinion of the practitioner. The medical note is intended as peer to peer communication and may appear blunt or  direct. It is written in medical language and may contain abbreviations or verbiage that are unfamiliar.

## 2024-07-08 NOTE — BRIEF PROCEDURE NOTE
Date of service 7/8/2024      Patient had a left heart catheterization with coronary angiography and graft angiography.  Patient had what appeared to be of the thrombus in the distal anastomosis of the vein graft to the diagonal and PDA.  Patient will be placed on Integrilin and transferred to the appropriate unit.  An Angio-Seal closure device was used for hemostasis.  See final dictation.

## 2024-07-08 NOTE — ED INITIAL ASSESSMENT (HPI)
Patient complains of generalized chest pain with cough that woke him up out of sleep approx 20min pta.

## 2024-07-08 NOTE — PROGRESS NOTES
07/08/24 0236 07/08/24 0238 07/08/24 0240   Vital Signs   Pulse 74 86 79   Heart Rate Source Monitor  --   --    Resp 14  --   --    Respiratory Quality Normal  --   --    BP (!) 128/92 121/83 128/87   MAP (mmHg) (!) 103 94 100   BP Location Left arm  --   --    BP Method Automatic  --   --    Patient Position Lying Sitting Standing

## 2024-07-08 NOTE — PROGRESS NOTES
[facility]   part of [facility]     Hospitalist Progress Note     Doreen Steiner Patient Status:  Inpatient    1960 MRN [id number]   Location [facility] 2NE-A Attending Vincenzo, Conrad Hancock, *   Hosp Day # 0 PCP Avis Weeks MD     Chief Complaint: Chest pain     Subjective:      -Patient seen postop, tolerated procedure well, noted to have thrombus in the distal anastomosis of the vein graft to the diagonal and PDA.   -Denies any current chest pain, fevers, chills, shortness of breath, abdominal pain    Objective:    Review of Systems:   A comprehensive review of systems was completed; pertinent positive and negatives stated in subjective.    Vital signs:  Temp:  [97.4 °F (36.3 °C)-98.2 °F (36.8 °C)] 97.5 °F (36.4 °C)  Pulse:  [74-86] 83  Resp:  [12-18] 14  BP: (118-158)/(70-98) 118/70  SpO2:  [91 %-98 %] 95 %    Physical Exam:    General: No acute distress  Respiratory: no wheezes, no rhonchi  Cardiovascular: S1, S2, regular rate and rhythm  Abdomen: Soft, Non-tender, non-distended, positive bowel sounds right groin with dressing clean, dry, intact without significant swelling, erythema, bruising noted  Neuro: No new focal deficits.   Extremities: no edema    Diagnostic Data:    Labs:  Recent Labs   Lab 24   WBC 9.0 9.2   HGB 15.8 14.5   MCV 84.6 87.3   .0 213.0       Recent Labs   Lab 2424   * 159*   BUN 23 24*   CREATSERUM 0.80 0.81   CA 9.2 8.9   ALB 3.6  --     139   K 3.8 4.6    108   CO2 27.0 25.0   ALKPHO 84  --    AST 30  --    ALT 44  --    BILT 0.4  --    TP 7.3  --        Estimated Creatinine Clearance: 96.8 mL/min (based on SCr of 0.81 mg/dL).    Recent Labs   Lab 247 07/08/24  0624   TROPHS 114* 760* 21,830*       No results for input(s): \"PTP\", \"INR\" in the last 168 hours.               Microbiology    No results found for this visit on 24.      Imaging: Reviewed  in Epic.    Medications:    aspirin  81 mg Oral Daily    rosuvastatin  20 mg Oral Nightly    ED initial dose heparin  1,000 Units/hr Intravenous Once       Assessment & Plan:      # NSTEMI  - LHC with thrombus in distal stenosis of the vein graft to the diagonal and PDA  - Heparin ggt continued  - Integrilin per cardiology   - TTE with preserved EF, no significant valvular abnormalities   - Hx CABG 2004  - Cardiology on consult.     # CAD   S/P CABG  -Will continue on aspirin and statin.    # Hypertension - Continue home oral antihypertensives  # Hyperlipidemia - continue home statin       Conrad Loya MD    Supplementary Documentation:     Quality:  DVT Mechanical Prophylaxis:   SCDs,    DVT Pharmacologic Prophylaxis   Medication    heparin (Porcine) 85806 units/250mL infusion ED INITIAL DOSE    heparin (Porcine) 08709 units/250mL infusion ACS/AFIB CONTINUOUS         DVT Pharmacologic prophylaxis: Aspirin 81 mg      Code Status: Not on file  Davies: No urinary catheter in place  Davies Duration (in days):   Central line:    HUMBERTO:     The 21st Century Cures Act makes medical notes like these available to patients in the interest of transparency. Please be advised this is a medical document. Medical documents are intended to carry relevant information, facts as evident, and the clinical opinion of the practitioner. The medical note is intended as peer to peer communication and may appear blunt or direct. It is written in medical language and may contain abbreviations or verbiage that are unfamiliar.

## 2024-07-08 NOTE — PROGRESS NOTES
Doreen Steiner Patient Status:  Emergency    1960 MRN [id number]   Location [facility] EMERGENCY DEPARTMENT Attending Ana Fraser DO   Hosp Day # 0 PCP Avis Weeks MD     Cardiology Nocturnal APN Note    Briefly: (Documentation from chart review)     Doreen Steiner is a 65 y/o male with PMH/PSH of CAD s/p CABG, HLD, CHF w/LVEF 45-50% who presented to the ED with chest pain that woke him. Troponin elevated and 2 hour is higher then initial. Would place on heparin gtt ACS protocol    Primary Cardiologist Richard    Vital Signs:       2024    11:36 PM 2024    12:15 AM   Vitals History   /89 143/98   Pulse 77 79   Resp 18 16   SpO2 98 % 96 %        Labs:   Lab Results   Component Value Date    WBC 9.0 2024    HGB 15.8 2024    HCT 45.5 2024    .0 2024    CREATSERUM 0.80 2024    BUN 23 2024     2024    K 3.8 2024     2024    CO2 27.0 2024     2024    CA 9.2 2024    ALB 3.6 2024    ALKPHO 84 2024    BILT 0.4 2024    TP 7.3 2024    AST 30 2024    ALT 44 2024    TROPHS 760 2024       Diagnostics:   XR CHEST AP PORTABLE  (CPT=71045)    Result Date: 2024  CONCLUSION:  No acute process.  Lungs are clear.  Stable changes of prior CABG.   LOCATION:  [place]      Dictated by (CST): Elías Suarez DO on 2024 at 11:57 PM     Finalized by (CST): Elías Suarez DO on 2024 at 11:58 PM        Allergies:  No Known Allergies    Medications:  No current facility-administered medications for this encounter.    Assessment:   Chest pain  - Troponin 114>760  - Start heparin gtt, ACS protocol  - NPO for possible LHC  - ECG without acute changes  - Hx CAD s/p CABG      Plan:    - Continue to monitor overnight  - Formal Cardiology consult to follow in AM.       MICHOACANO Myers  [facility]  2024  1:22 AM

## 2024-07-08 NOTE — ED PROVIDER NOTES
Patient Seen in: [facility] Emergency Department      History     Chief Complaint   Patient presents with    Chest Pain Angina     Stated Complaint: chest pain    Subjective:   Patient is 64-year-old male with history of coronary disease with triple bypass back in 2003 who presents emergency room for evaluation of chest pain that woke him from sleep.  Patient reports about 45 minutes after going to bed he is to awoke with chest pain.  He did have some food at approximate 4 PM.  Patient reports feels similar to when he had his episode back in 2003.  Patient is compliant with aspirin.  He does not take anything for blood pressure but does take something for cholesterol.  He sees his cardiologist approximate once a year    The history is provided by the patient.           Objective:   Past Medical History:    Acute, but ill-defined, cerebrovascular disease    Back pain    CAD (coronary artery disease)    2004    Essential hypertension    Hyperlipidemia    Stress    Weight gain              Past Surgical History:   Procedure Laterality Date    Appendectomy      Bypass surgery  2004    x4 , 2004                Social History     Socioeconomic History    Marital status:    Tobacco Use    Smoking status: Never    Smokeless tobacco: Never   Vaping Use    Vaping status: Never Used   Substance and Sexual Activity    Alcohol use: No    Drug use: No     Social Determinants of Health     Physical Activity: Insufficiently Active (10/28/2020)    Received from [employer], [facility]    Exercise Vital Sign     Days of Exercise per Week: 3 days     Minutes of Exercise per Session: 40 min              Review of Systems   Cardiovascular:  Positive for chest pain.       Positive for stated Chief Complaint: Chest Pain Angina    Other systems are as noted in HPI.  Constitutional and vital signs reviewed.      All other systems reviewed and negative except as noted above.    Physical Exam     ED Triage  Vitals   BP 07/07/24 2238 (!) 158/97   Pulse 07/07/24 2238 82   Resp 07/07/24 2238 18   Temp 07/07/24 2253 98.2 °F (36.8 °C)   Temp src 07/07/24 2238 Oral   SpO2 07/07/24 2238 98 %   O2 Device 07/07/24 2238 None (Room air)       Current Vitals:   Vital Signs  BP: (!) 143/98  Pulse: 79  Resp: 16  Temp: 98.2 °F (36.8 °C)  Temp src: Oral  MAP (mmHg): (!) 109    Oxygen Therapy  SpO2: 96 %  O2 Device: None (Room air)            Physical Exam  Vitals and nursing note reviewed.   Constitutional:       General: He is not in acute distress.     Appearance: He is well-developed and normal weight. He is not toxic-appearing.   HENT:      Head: Normocephalic and atraumatic.   Eyes:      Extraocular Movements: Extraocular movements intact.      Pupils: Pupils are equal, round, and reactive to light.   Cardiovascular:      Rate and Rhythm: Normal rate and regular rhythm.      Heart sounds: Normal heart sounds.   Pulmonary:      Effort: Pulmonary effort is normal.      Breath sounds: Normal breath sounds. No wheezing.   Chest:      Chest wall: No mass or tenderness.   Abdominal:      Palpations: Abdomen is soft.      Tenderness: There is no abdominal tenderness. There is no guarding or rebound.   Musculoskeletal:         General: Normal range of motion.      Cervical back: Normal range of motion and neck supple.   Skin:     General: Skin is warm.      Capillary Refill: Capillary refill takes less than 2 seconds.   Neurological:      General: No focal deficit present.      Mental Status: He is alert and oriented to person, place, and time.   Psychiatric:         Mood and Affect: Mood normal.         Behavior: Behavior normal.               ED Course     Labs Reviewed   COMP METABOLIC PANEL (14) - Abnormal; Notable for the following components:       Result Value    Glucose 136 (*)     All other components within normal limits   TROPONIN I HIGH SENSITIVITY - Abnormal; Notable for the following components:    Troponin I (High  Sensitivity) 114 (*)     All other components within normal limits   TROPONIN I HIGH SENSITIVITY - Abnormal; Notable for the following components:    Troponin I (High Sensitivity) 760 (*)     All other components within normal limits   LIPID PANEL - Abnormal; Notable for the following components:    HDL Cholesterol 39 (*)     Triglycerides 184 (*)     All other components within normal limits   CBC W/ DIFFERENTIAL - Abnormal; Notable for the following components:    Lymphocyte Absolute 4.56 (*)     Monocyte Absolute 1.05 (*)     All other components within normal limits   CBC WITH DIFFERENTIAL WITH PLATELET    Narrative:     The following orders were created for panel order CBC With Differential With Platelet.  Procedure                               Abnormality         Status                     ---------                               -----------         ------                     CBC W/ DIFFERENTIAL[[id number]]          Abnormal            Final result                 Please view results for these tests on the individual orders.   PTT, ACTIVATED     EKG    Rate, intervals and axes as noted on EKG Report.  Rate: 88  Rhythm: Sinus Rhythm  Reading: Normal sinus rhythm with frequent PVCs no ST elevation IN interval 182 QRS of 108 QTc of 459 with axes 61/18/70              ED Course as of 07/08/24 0127  ------------------------------------------------------------  Time: 07/07 2347  Value: Troponin I (High Sensitivity)  Comment: (Reviewed)     XR CHEST AP PORTABLE  (CPT=71045)    Result Date: 7/7/2024  PROCEDURE:  XR CHEST AP PORTABLE  (CPT=71045)  TECHNIQUE:  AP chest radiograph was obtained.  COMPARISON:  [facility], CHEST PA   LATERAL, 9/30/2004, 2:56 PM.  INDICATIONS:  chest pain  PATIENT STATED HISTORY: (As transcribed by Technologist)  Patient offered no additional history at this time    FINDINGS:  Lungs are clear.  Stable changes of prior CABG.  No evidence of pleural disease.  Normal pulmonary vasculature.  No  acute osseous findings.            CONCLUSION:  No acute process.  Lungs are clear.  Stable changes of prior CABG.   LOCATION:  [facility]      Dictated by (CST): Elías Suarez DO on 7/07/2024 at 11:57 PM     Finalized by (CST): Elías Suarez DO on 7/07/2024 at 11:58 PM               MDM      Social -negative tobacco, positive etoh, negative drugs  Family History-noncontributory  Past Medical History-coronary disease, high cholesterol    Differential diagnosis before testing included acute coronary syndrome, GERD, esophagitis, reflux    Co-morbidities that add to the complexity of management include: History of CABG    Testing ordered during this visit included chest x-ray EKG 2 sets of cardiac enzymes    Radiographic images  I personally reviewed the radiographs and my individual interpretation shows no acute process  I also reviewed the official reports that showed XR CHEST AP PORTABLE  (CPT=71045)    Result Date: 7/7/2024  PROCEDURE:  XR CHEST AP PORTABLE  (CPT=71045)  TECHNIQUE:  AP chest radiograph was obtained.  COMPARISON:  [facility], CHEST PA   LATERAL, 9/30/2004, 2:56 PM.  INDICATIONS:  chest pain  PATIENT STATED HISTORY: (As transcribed by Technologist)  Patient offered no additional history at this time    FINDINGS:  Lungs are clear.  Stable changes of prior CABG.  No evidence of pleural disease.  Normal pulmonary vasculature.  No acute osseous findings.            CONCLUSION:  No acute process.  Lungs are clear.  Stable changes of prior CABG.   LOCATION:  [facility]      Dictated by (CST): Elías Suarez DO on 7/07/2024 at 11:57 PM     Finalized by (CST): Elías Suarez DO on 7/07/2024 at 11:58 PM          External chart review showed review of care everywhere in [employer] system shows patient had a stress test approximately a year ago.    History obtained by an independent source included from patient    Discussion of management with patient, hospitalist, cardiology    Social determinants of health that affect care  include not applicable      Medications Provided: Pain GI cocktail Zofran    Course of Events during Emergency Room Visit include 64-year-old male presents emergency room for evaluation of chest pain that awoke from sleep.  Patient was not diaphoretic.  He had taken 1 baby aspirin today was given the remainder of the aspirin here.  His pain is down to 6 will give morphine to help reduce pain thoroughly.  Patient discussed with cardiology as his initial troponin is elevated.  Second troponin is higher it would want the patient to be started on heparin, not they will hold on heparin.  Patient be admitted under [place] hospitalist      Patient second troponin was elevated patient started on heparin    Disposition:    Admission  I have discussed with the patient the results of test, differential diagnosis, and treatment plan. They expressed clear understanding of these instructions and agrees to the plan provided.     Admission disposition: 7/8/2024  1:26 AM                                        Medical Decision Making      Disposition and Plan     Clinical Impression:  1. NSTEMI (non-ST elevated myocardial infarction) (HCC)    2. Acute chest pain         Disposition:  Admit  7/8/2024  1:26 am    Follow-up:  No follow-up provider specified.        Medications Prescribed:  Current Discharge Medication List                            Hospital Problems       Present on Admission  Date Reviewed: 6/26/2024            ICD-10-CM Noted POA    * (Principal) NSTEMI (non-ST elevated myocardial infarction) (HCC) I21.4 7/8/2024 Unknown

## 2024-07-08 NOTE — HISTORICAL OFFICE NOTE
[facility]  Outside Information  Continuity of Care Document  1/31/2023  Doreen Steiner - 64 y.o. Male; born [birth year]Summary of episode note, generated on Jul. 07, 2024July 07, 2024   CHIEF COMPLAINT    CHIEF COMPLAINT  Reason for Visit/Chief Complaint   follow up after testing  TTE 1/20  SPECT 1/6  Holter 12/28   Follow-up visit for Mr. Steiner. 62-year-old gentleman with history of CAD, hyperlipidemia and prior CABG. Baseline echocardiogram with ejection fraction low normal to slightly depressed at 45 to 50%. Recent echo confirmed no change. A stress test showed normal perfusion with no microischemia.In the office today he denies chest pain, thumping, PND, near-syncope or syncope.     PROBLEMS  Reconcile with Patient's ChartPROBLEMS  Problem Effective Dates Date resolved Problem Status   Coronary artery disease involving coronary bypass graft of native heart without angina pectoris, [SNOMED-CT: 229354319] 11/22/2021 - Active   Hyperlipidemia, mixed, [SNOMED-CT: 004193791] 12/3/2021 - Active   Acute myocardial infarction, unspecified site, episode of care unspecified, [SNOMED-CT: 63184478] 6/29/2012 - Active   History of adenomatous polyp of colon, [SNOMED-CT: 833206179] 8/23/2021 - Active   Benign neoplasm of cecum, [SNOMED-CT: 43476042] 8/23/2021 - Active   Benign neoplasm of ascending colon, [SNOMED-CT: 60337038] 8/23/2021 - Active   Benign neoplasm of transverse colon, [SNOMED-CT: 49670280] 8/23/2021 - Active     ENCOUNTER DIAGNOSIS    No data available    VITAL SIGNS    VITAL SIGNS  Date / Time: 2/1/2023   BP Systolic 138 mmHg   BP Diastolic 82 mmHg   Height 70 inches   Weight 226 lbs   Pulse Rate 62 bpm   BSA (Body Surface Area) 2.3 cc/m2   BMI (Body Mass Index) 32.4 cc/m2   Blood Pressure 138 / 82 mmHg     PHYSICAL EXAMINATION    PHYSICAL EXAMINATION  Header Details   Vitals Right Arm Sitting  / 82 mmHg, Pulse rate 62 bpm, Height in 5' 10\", BMI: 32.4, Weight in  227.08 lbs (or) 103 kgs, BSA : 2.28 cc/m²   General Appearance No Acute Distress   Cardiovascular s1, s2 reg, no murmur,   Lower Extremities no edema     ALLERGIES, ADVERSE REACTIONS, ALERTS    No data available    MEDICATIONS ADMINISTERED DURING VISIT    No data available    MEDICATIONS  Reconcile with Patient's ChartMEDICATIONS  Medication Start Date Route/Frequency Status   aspirin 81 MG Oral Tab EC, [RxNorm: 994577] 11/22/2021 Take 81 mg by mouth daily. Active   metoprolol tartrate 25 mg tablet, [RxNorm: 251290] 12/28/2022 Take 1 tablet orally 2 times a day. Active   ROSUVASTATIN 20MG TABLETS, [RxNorm: 127130] - TAKE 1 TABLET BY MOUTH EVERY DAY Active     ASSESSMENT    Compensated cardiac status. Low risk for future cardiovascular events based on a normal perfusion nuclear stress test. Asked him to continue current cardiac regimen. He is due for fasting lipid profile and a CMP. Follow-up with me in a year.Increased BMI: Provide patient with information regarding diet and lifestyle changes.     FAMILY HISTORY    No data available    GENERAL STATUS    No data available    PAST MEDICAL HISTORY    PAST MEDICAL HISTORY  Problem Date diagonsed Date resolved Status   Coronary artery disease involving coronary bypass graft of native heart without angina pectoris, [SNOMED-CT: 429729324] 11/22/2021 - Active   Hyperlipidemia, mixed, [SNOMED-CT: 334998774] 12/3/2021 - Active   Acute myocardial infarction, unspecified site, episode of care unspecified, [SNOMED-CT: 09698939] 6/29/2012 - Active   History of adenomatous polyp of colon, [SNOMED-CT: 386163151] 8/23/2021 - Active   Benign neoplasm of cecum, [SNOMED-CT: 08063214] 8/23/2021 - Active   Benign neoplasm of ascending colon, [SNOMED-CT: 81817181] 8/23/2021 - Active   Benign neoplasm of transverse colon, [SNOMED-CT: 43595693] 8/23/2021 - Active     HISTORY OF PRESENT ILLNESS    Follow-up visit for Mr. Steiner. 62-year-old gentleman with history of CAD, hyperlipidemia and  prior CABG. Baseline echocardiogram with ejection fraction low normal to slightly depressed at 45 to 50%. Recent echo confirmed no change. A stress test showed normal perfusion with no microischemia.In the office today he denies chest pain, thumping, PND, near-syncope or syncope.     IMMUNIZATIONS    No data available    PLAN OF CARE    PLAN OF CARE  Planned Care Date   [facility] 1/1/1900   Lipid panel, Fasting 1/1/1900   Follow up visit - Chepe aRmos 2/7/2023     PROCEDURES    No data available    RESULTS    RESULTS  Name Result Date Location - Ordered By   [facility] [LOINC: 71093-0] Pending Schedule next available     Lipid panel, Fasting [LOINC: 39654-0] Pending Schedule next available     Myocardial Perfusion Imaging 1.Stress EKG shows no ST segment changes but there were frequent PVCs noted during exercise, at times consecutive. 2.Maximal exercise treadmill test (MPHR : 89 %).3.The functional capacity is good (10.1 METs).1.The study quality is good. 2.This is a normal perfusion study, no perfusion defects noted. There is no evidence of ischemia. 3.This scan is suggestive of low risk for future cardiovascular events. 4.The left ventricular cavity is noted to be normal on the stress study. The left ventricular ejection fraction was calculated to be 48% and left ventricular global function is normal. 1/6/2023 7:15:00 AM Chepe Ramos   Trans Thoracic Echocardiogram 1.The study quality is average. 2.The left ventricle is normal in size Global left ventricular systolic function is mildly decreased. The left ventricular ejection fraction is 45-50%. Left ventricular diastolic function is normal. 3.The left atrial diameter is mildly increased. Left atrial diameter is 4.6 cms. 4.The right ventricle is normal in size. Right ventricular systolic function is normal.5.The pulmonary artery pressure could not be obtained due to suboptimal TR jet 1/20/2023 7:30:00 AM Chepe Ramos   Holter Monitoring 1.This is an excellent  quality study. 2.Predominant rhythm is normal sinus rhythm. 3.The minimum heart rate recorded was 57 beats / minute (12/29/2022). The maximum heart rate is 145 beats / minute (12/28/2022). The mean heart rate is 79 beats / minute. 4.No evidence of AV block is noted. 5.Rare premature atrial contractions noted. 6.No evidence of supraventricular tachycardia is noted.7.Afib burden 0%8.Frequent premature ventricular contractions noted. 9.No pauses were noted. 10.*The observed rhythms are sinus bradycardia to sinus tachycardia with frequent ventricular ectopy. *The Maximum Heart Rate recorded was 145 bpm, Day 1 / 04:29:40 pm, the Minimum Heart Rate recorded was 57 bpm, Day 2 / 09:00:58 am and the Average Heart Rate was 79 bpm. *There were 95171 PVCs with a burden of 34.81 %. There were 31 occurrences of Ventricular Tachycardia with the longest episode 3 beats, Day 1 / 05:02:12 pm and the fastest episode 145 bpm, Day 1 / 04:29:44 pm. *There were 157 PSVCs with a burden of 0.14 %. *There were 2 Patient reported events.11.High PVC burden- 34% 12/28/2022 4:00:00 PM Dalia Rob     REVIEW OF SYSTEMS    REVIEW OF SYSTEMS  Header Details   Cardiovascular No history of Chest pain, CEDILLO, Palpitations, Syncope, PND, Orthopnea, Edema, Claudication   Respiratory No history of SOB     SOCIAL HISTORY    SOCIAL HISTORY  Social History Element Description Effective Dates   Smoking status Never smoked -     FUNCTIONAL STATUS    No data available    INSTRUCTIONS    INSTRUCTIONS  NAME TYPE DATE   Increased BMI: Provide patient with information regarding diet and lifestyle changes. Goals 2/1/2023     MEDICAL EQUIPMENT    No data available    Goals Sections    No data available    REASON FOR REFERRAL    No data available    Health Concerns Section    No data available    COGNITIVE/MENTAL STATUS    No data available    Patient Demographics    Patient Demographics  Patient Address Patient Name Communication   [address] [place], [place]  [address] Doreen Steiner [phone] (Mobile)     Patient Demographics  Language Race / Ethnicity Marital Status   Unknown White / Unknown      Document Information    Primary Care Provider Other Service Providers Document Coverage Dates   Richard Mo  NPI: [id number]  [phone] (Work)  [address] [place] [address]  [place] [address]  Interpreting Physicians  [facility]  [phone] (Work)  [address] [place] [id number] [place] VANESSAK Dinora  NPI: [id number]  [phone] (Work)  [address] [place] [address] [id number] [place] [id number]  Nurses     Dianna Cheung  NPI: [id number]  [phone] (Work)  [address] [place] [address]  [place] [address]  Nurses Feb. 02, 2023February 02, 2023      Organization   [facility]  [phone] (Work)  [address] [place] [address]  [place] [address]     Encounter Providers Encounter Date    Feb. 01, 2023February 01, 2023     Legal Authenticator    Chepe Ramos  NPI: [id number]  [phone] (Work)  [address] [place] [id number]  [place] [address]

## 2024-07-08 NOTE — CM/SW NOTE
Pt discussed in rounds and chart was reviewed. No anticipated DC needs identified at this time. Will monitor for any changes in needs.      to remain available for support and/or discharge planning.    DONNA Santamaria  Discharge Planner  [phone]

## 2024-07-08 NOTE — CONSULTS
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  Felt severe midsternal chest pressure last night.  Now resolved. Diagnosed with NSTEMI in ED and started on IV heparin.  Now CP free.    ECG shows sinus rhythm with frequent PVCs, tele with frequent PVCs at times in pattern of bigeminy and short runs of non-sustained VT  History:  Past Medical History:    Acute, but ill-defined, cerebrovascular disease    Back pain    CAD (coronary artery disease)        Essential hypertension    Hyperlipidemia    Stress    Weight gain     Past Surgical History:   Procedure Laterality Date    Appendectomy      Bypass surgery  2004    x4 ,      Family History   Problem Relation Age of Onset    Other (etoh) Father     Cancer Mother         lung ca      reports that he has never smoked. He has never used smokeless tobacco. He reports that he does not drink alcohol and does not use drugs.    Allergies:  No Known Allergies    Medications:    Current Facility-Administered Medications:     aspirin DR tab 81 mg, 81 mg, Oral, Daily    rosuvastatin (Crestor) tab 20 mg, 20 mg, Oral, Nightly    melatonin tab 3 mg, 3 mg, Oral, Nightly PRN    ondansetron (Zofran) 4 MG/2ML injection 4 mg, 4 mg, Intravenous, Q6H PRN    metoclopramide (Reglan) 5 mg/mL injection 10 mg, 10 mg, Intravenous, Q8H PRN    acetaminophen (Tylenol Extra Strength) tab 500 mg, 500 mg, Oral, Q4H PRN    polyethylene glycol (PEG 3350) (Miralax) 17 g oral packet 17 g, 17 g, Oral, Daily PRN    sennosides (Senokot) tab 17.2 mg, 17.2 mg, Oral, Nightly PRN     bisacodyl (Dulcolax) 10 MG rectal suppository 10 mg, 10 mg, Rectal, Daily PRN    fleet enema (Fleet) 7-19 GM/118ML rectal enema 133 mL, 1 enema, Rectal, Once PRN    heparin (Porcine) 33961 units/250mL infusion ACS/AFIB CONTINUOUS, 200-3,000 Units/hr, Intravenous, Continuous    Review of Systems:  A comprehensive review of systems was negative if not otherwise mention in above HPI.    /80 (BP Location: Left arm)   Pulse 91   Temp 97.6 °F (36.4 °C) (Oral)   Resp 18   Wt 227 lb 8.2 oz   SpO2 96%   BMI 31.73 kg/m²   Temp (24hrs), Av.7 °F (36.5 °C), Min:97.4 °F (36.3 °C), Max:98.2 °F (36.8 °C)       Intake/Output Summary (Last 24 hours) at 2024 0934  Last data filed at 2024 0840  Gross per 24 hour   Intake 60 ml   Output 1 ml   Net 59 ml     Wt Readings from Last 3 Encounters:   24 227 lb 8.2 oz   24 232 lb 4.8 oz   24 232 lb 3.2 oz       Physical Exam:   General: Alert and oriented x 3.   HEENT: Normocephalic  Neck: No JVD  Cardiac: Regular rate and rhythm. S1, S2 normal. No murmur, pericardial rub, S3.  Lungs: Clear anteriorly  Extremities: Without edema  Neurologic: Alert and oriented, normal affect.  Skin: Warm and dry.     Laboratory Data:  Lab Results   Component Value Date    WBC 9.2 2024    HGB 14.5 2024    HCT 43.3 2024    .0 2024    CREATSERUM 0.81 2024    BUN 24 2024     2024    K 4.6 2024     2024    CO2 25.0 2024     2024    CA 8.9 2024    ALB 3.6 2024    ALKPHO 84 2024    BILT 0.4 2024    TP 7.3 2024    AST 30 2024    ALT 44 2024    PTT 46.4 2024       Imaging:  CXR yesterday:  CONCLUSION:    No acute process.  Lungs are clear.  Stable changes of prior CABG.   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 The risks and benefits of the procedure were explained to the patient. 1-2% risk of coronary angiography, possible angioplasty, include, but are not limited to stroke, death, heart attack, coronary dissection requiring emergent CABG, hematoma, bleeding, allergic reaction to medications, vascular damage requiring surgical repair, kidney failure requiring dialysis and others. Patient wishes to proceed.      Thank you for allowing me to participate in the care of your patient.    Chepe Ramos MD  7/8/2024  9:34 AM

## 2024-07-08 NOTE — PLAN OF CARE
Assumed care of patient @ 0250.    Admitted from ED for chest pain. Admission navigator completed. Patient alert and oriented x4. Oxygen WDL on RA. PVCs noted on tele. On heparin drip at this time. Standby assist. Call light within reach. Makes needs known.    Plan:   Troponin draw  Echocardiogram  Eval by cardiology    Problem: Patient/Family Goals  Goal: Patient/Family Long Term Goal  Description: Patient's Long Term Goal: Return to baseline    Interventions:  - Discharge home with completed treatment  - See additional Care Plan goals for specific interventions  Outcome: Progressing  Goal: Patient/Family Short Term Goal  Description: Patient's Short Term Goal: Experience no complaint of chest pain    Interventions:   - Monitor troponin levels  - Consulted with cardiology  - See additional Care Plan goals for specific interventions  Outcome: Progressing

## 2024-07-09 LAB
ANION GAP SERPL CALC-SCNC: 3 MMOL/L (ref 0–18)
BUN BLD-MCNC: 19 MG/DL (ref 9–23)
CALCIUM BLD-MCNC: 9.4 MG/DL (ref 8.5–10.1)
CHLORIDE SERPL-SCNC: 105 MMOL/L (ref 98–112)
CO2 SERPL-SCNC: 30 MMOL/L (ref 21–32)
CREAT BLD-MCNC: 0.97 MG/DL
EGFRCR SERPLBLD CKD-EPI 2021: 87 ML/MIN/1.73M2 (ref 60–?)
GLUCOSE BLD-MCNC: 109 MG/DL (ref 70–99)
MAGNESIUM SERPL-MCNC: 2 MG/DL (ref 1.6–2.6)
MAGNESIUM SERPL-MCNC: 2 MG/DL (ref 1.6–2.6)
OSMOLALITY SERPL CALC.SUM OF ELEC: 289 MOSM/KG (ref 275–295)
POTASSIUM SERPL-SCNC: 4.1 MMOL/L (ref 3.5–5.1)
SODIUM SERPL-SCNC: 138 MMOL/L (ref 136–145)

## 2024-07-09 PROCEDURE — 99232 SBSQ HOSP IP/OBS MODERATE 35: CPT | Performed by: STUDENT IN AN ORGANIZED HEALTH CARE EDUCATION/TRAINING PROGRAM

## 2024-07-09 RX ORDER — SODIUM CHLORIDE 9 MG/ML
INJECTION, SOLUTION INTRAVENOUS
Status: COMPLETED | OUTPATIENT
Start: 2024-07-10 | End: 2024-07-10

## 2024-07-09 NOTE — PLAN OF CARE
Assumed care of patient at 0700  Denies pain  Niece updated on POC  Continue Integrilin gtt  PRN Zofran for nausea. Patient has poor appetite  Orders for Cath tomorrow. Patient awaiting Dr. Johnson to round before signing consent forms. NPO at midnight  Patient ambulating room  Currently resting in chair, call light within reach      Problem: Patient/Family Goals  Goal: Patient/Family Long Term Goal  Description: Patient's Long Term Goal: Return to baseline    Interventions:  - Discharge home with completed treatment  - See additional Care Plan goals for specific interventions  Outcome: Progressing  Goal: Patient/Family Short Term Goal  Description: Patient's Short Term Goal: Experience no complaint of chest pain    Interventions:   - Monitor troponin levels  - Consulted with cardiology  - See additional Care Plan goals for specific interventions  Outcome: Progressing

## 2024-07-09 NOTE — PLAN OF CARE
Assumed care of patient 1930.  Alert and oriented x4. VSS. On RA with adequate oxygen saturation. Bigeminal PVCs on tele. No complaint of chest pain from patient. BM today in morning and patient voiding. Standby assist d/t IV pole. Call light within reach. Bed alarm on. Makes needs known.    Plan:  Integrillin infusing per orders  Continue monitoring right groin for hematoma and bleeding.    Problem: Patient/Family Goals  Goal: Patient/Family Long Term Goal  Description: Patient's Long Term Goal: Return to baseline    Interventions:  - Discharge home with completed treatment  - See additional Care Plan goals for specific interventions  Outcome: Progressing  Goal: Patient/Family Short Term Goal  Description: Patient's Short Term Goal: Experience no complaint of chest pain    Interventions:   - Monitor troponin levels  - Consulted with cardiology  - See additional Care Plan goals for specific interventions  Outcome: Progressing

## 2024-07-09 NOTE — PROCEDURES
[facility]    PATIENT'S NAME: DAY CONLEY   ATTENDING PHYSICIAN: Conrad Loya MD   OPERATING PHYSICIAN: Pawan Ribera M.D.   PATIENT ACCOUNT#:   [id number]    LOCATION:  [address] [place] [address]  MEDICAL RECORD #:   [id number]       YOB: 1960  ADMISSION DATE:       07/07/2024      OPERATION DATE:  07/08/2024    CARDIAC PROCEDURE TRANSCRIPTION    CARDIAC CATHETERIZATION    PREOPERATIVE DIAGNOSIS:    1.   Non-ST-segment elevation myocardial infarction.  2.   History of coronary artery bypass grafting surgery in 2004.  3.   Hyperlipidemia.  POSTOPERATIVE DIAGNOSIS:  Coronary artery disease.  PROCEDURE PERFORMED:      OPERATORS:  Carlyn Broussard M.D. and Pawan Ribera M.D.    DESCRIPTION OF PROCEDURE:  After informed consent obtained, patient was prepped and draped in usual sterile fashion.  Micropuncture-guided access was obtained by Dr. Broussard with placement of a 6-Luxembourgish sheath.  Chemo right, left, pigtail, and mammary catheters were used for the diagnostic procedure.  At the end of the case, a 6-Luxembourgish Angio-Seal was used to seal the right common femoral artery site.  There were no apparent acute complications noted, and the patient tolerated the procedure well.    SEDATION:  The patient received conscious sedation.  This was monitored by myself and the catheterization lab staff.  This started at 1217 and ended at 1255.    FINDINGS:  Injection of the left main coronary artery did not reveal significant disease in the left main.  Left main bifurcates into left anterior descending and circumflex coronary arteries.  Left anterior descending artery is slightly occluded at its ostium.  Late or slow filling of a septal branch is noted.    The circumflex coronary artery gives rise to a large obtuse marginal branch.  There is ectasia noted proximally with a 30% to 50% stenosis in the proximal segment.  This is a fairly large vessel.  Late filling of the distal right coronary artery is noted with left  injections.     The right coronary artery is occluded proximally.  Bridging collateral is noted to the mid right coronary artery, and it appears to be subsequently occluded thereafter.    GRAFT ANGIOGRAPHY:  What appears to be the jump saphenous vein graft to the diagonal and most likely the PDA is noted.  The graft portion to the diagonal is patent.  There is thrombus without any flow to the distal portion to the PD.  There is a 90% stenosis at the takeoff of this anastomosis of the diagonal to the vein graft.  There is retrograde filling of a smaller branch of this diagonal.  There is an end-to-side anastomosis.    The left internal mammary artery graft to the left anterior descending artery is patent.    By report, patient apparently had a vein graft to an obtuse marginal branch which we were unable to locate.  It is quite possible that the above-mentioned vein graft was likely to 3 spots from its appearance.    VENTRICULOGRAPHY:  The patient is known to have normal left ventricle function based on echo done a few minutes ago.  Left ventricular end-diastolic pressure was 22 mmHg.  No significant gradient was noted upon pullback across the aortic valve.    SUMMARY:  Occlusion of a skip saphenous vein graft to a diagonal, obtuse marginal branch, and PDA.  The graft is 20 years old and is thrombotic from the takeoff of the ostium of the diagonal.  The patient currently is asymptomatic and likely, as he presented to the hospital approximately 48 hours ago, has completed his infarct.  To minimize the risk of further embolizing distal runoff, we chose not to proceed forward.  He will be placed on a IIb/IIIa inhibitor, and his anatomy based on his clinical status will likely be reassessed in the next 24 to 48 hours.    Dictated By Pawan Ribera M.D.  d: 07/08/2024 13:26:45  t: 07/08/2024 14:56:57  [facility] [id number]  [place]/

## 2024-07-09 NOTE — PROGRESS NOTES
Progress Note  Doreen Steiner Patient Status:  Inpatient    1960 MRN [id number]   Location [facility] 2NE-A Attending Vincenzo, Conrad Hancock, *   Hosp Day # 1 PCP Avis Weeks MD     Subjective:  Pt states the chest pain he felt prior to coming to hospital is improved. He is still feeling L upper chest \"soreness\" at times. Denies SOB, nausea or diaphoresis. C/O lightheadedness earlier this am, now improved while laying down.     Objective:  /85 (BP Location: Left arm)   Pulse 70   Temp 98.8 °F (37.1 °C) (Oral)   Resp 16   Wt 227 lb 8.2 oz (103.2 kg)   SpO2 92%   BMI 31.73 kg/m²     Telemetry: SR w/freq PVC's, occ bigeminy/trigeminy, NSVT (max 4 beats)    Intake/Output:    Intake/Output Summary (Last 24 hours) at 2024 0845  Last data filed at 2024 0732  Gross per 24 hour   Intake 700 ml   Output 300 ml   Net 400 ml       Last 3 Weights   24 0310 227 lb 8.2 oz (103.2 kg)   24 0240 227 lb 8.2 oz (103.2 kg)   24 0135 227 lb 8.2 oz (103.2 kg)   24 2247 232 lb (105.2 kg)   24 1723 232 lb 4.8 oz (105.4 kg)   24 0853 232 lb 3.2 oz (105.3 kg)       Labs:  Recent Labs   Lab 24  0624   * 159*   BUN 23 24*   CREATSERUM 0.80 0.81   EGFRCR 98 98   CA 9.2 8.9    139   K 3.8 4.6    108   CO2 27.0 25.0     Recent Labs   Lab 24  0624   RBC 5.38 4.96   HGB 15.8 14.5   HCT 45.5 43.3   MCV 84.6 87.3   MCH 29.4 29.2   MCHC 34.7 33.5   RDW 13.7 13.6   NEPRELIM 3.21 6.23   WBC 9.0 9.2   .0 213.0         Recent Labs   Lab 24  2256 24  0047 24  0624   TROPHS 114* 760* 21,830*       Diagnostics:    TTE 2024  Conclusions:     1. Left ventricle: The cavity size was normal. Wall thickness was normal.      Systolic function was at the lower limits of normal. The estimated      ejection fraction was 50-55%, by visual assessment. Unable to assess LV      diastolic function due to heart  rhythm. Frequent PVCs made interpretation      of left ventricular function difficult.   2. Right ventricle: The cavity size was increased. Systolic function was      reduced.   3. Left atrium: The atrium was mildly dilated.   4. Aortic root: The aortic root was at the upper limits of normal.   5. Mitral valve: There was mild regurgitation.   Impressions:  No previous study was available for comparison.   *       No results found.   Review of Systems   Cardiovascular:  Positive for chest pain. Negative for dyspnea on exertion and leg swelling.       Physical Exam:    Gen: alert, oriented x 3, NAD  Heent: pupils equal, reactive. Mucous membranes moist.   Neck: no jvd  Cardiac: regular rate and rhythm, normal S1,S2, no murmur, clicks, rub or gallop  Lungs: CTA  Abd: soft, NT/ND +bs  Ext: no edema, +palpable pedal pulses  Skin: Warm, dry, R groin site w/dressing CDI, site soft, no hematoma noted  Neuro: No focal deficits      Medications:     aspirin  81 mg Oral Daily    rosuvastatin  20 mg Oral Nightly    metoprolol succinate  25 mg Oral Daily Beta Blocker    eptifibatide  180 mcg/kg Intravenous Once      eptifibatide 2 mcg/kg/min (07/09/24 0517)       Assessment:  NSTEMI  Troponin peak 74921  ECG w/ST depression, TWI lateral leads on admit  Echo LVEF 50-55%, RV size increased, reduced function, LA mildly dilated, mild MR  LHC w/thrombotic occlusion of SVG to diag, OM, PDA   Initially on IV Heparin, now on IV Integrilin gtt    Hx CAD s/p CABG (2004), Mild CM (LVEF 45-50% previously)  On asa, bb, statin   Frequent PVC's/Bigeminy - started toprol yesterday  Hyperlipidemia - LDL 48, on rosuvastatin    Plan:  Check Mg level this am - replace if needed per cardiac electrolyte protocol  Continue toprol 25 mg po daily  Continue on IV Integrilin infusion  Will review with interventional cardiology team for further recommendations.     Plan of care discussed with patient, JAMEL.    Tiana Parker, APRN  7/9/2024  8:45 AM  [phone]  [facility]  [phone] [facility]      NSTEMI with thrombus in graft. Reangio tomorrow and possible intervention.

## 2024-07-09 NOTE — CARDIAC REHAB
Cardiac rehab offered to patient.  Discussed education - risk factors, medications, discharge instructions, follow up care.  Patient verbalized understanding.  History of CABG 20 years ago and states participated in Cardiac rehab then.  Patient declined to set up initial appointment at this time.  Phone number given for patient to call us if he changes his mind.  All questions addressed.

## 2024-07-09 NOTE — PROGRESS NOTES
[facility]   part of [facility]     Hospitalist Progress Note     Doreen Steiner Patient Status:  Inpatient    1960 MRN [id number]   Location [facility] 2NE-A Attending Vincenzo, Conrad Hancock, *   Hosp Day # 1 PCP Avis Weeks MD     Chief Complaint: Chest pain     Subjective:      - No longer having chest pain, SOB   - Denies other f/c, abd pain, n/v    Objective:    Review of Systems:   A comprehensive review of systems was completed; pertinent positive and negatives stated in subjective.    Vital signs:  Temp:  [97.6 °F (36.4 °C)-98.3 °F (36.8 °C)] 98.3 °F (36.8 °C)  Pulse:  [70-91] 75  Resp:  [16-18] 16  BP: ()/() 102/75  SpO2:  [88 %-97 %] 95 %    Physical Exam:    General: No acute distress  Respiratory: no wheezes, no rhonchi  Cardiovascular: S1, S2, regular rate and rhythm  Abdomen: Soft, Non-tender, non-distended, positive bowel sounds.   Neuro: No new focal deficits.   Extremities: no edema    Diagnostic Data:    Labs:  Recent Labs   Lab 24  06   WBC 9.0 9.2   HGB 15.8 14.5   MCV 84.6 87.3   .0 213.0       Recent Labs   Lab 24  0624   * 159*   BUN 23 24*   CREATSERUM 0.80 0.81   CA 9.2 8.9   ALB 3.6  --     139   K 3.8 4.6    108   CO2 27.0 25.0   ALKPHO 84  --    AST 30  --    ALT 44  --    BILT 0.4  --    TP 7.3  --        Estimated Creatinine Clearance: 96.8 mL/min (based on SCr of 0.81 mg/dL).    Recent Labs   Lab 247 24  0624   TROPHS 114* 760* 21,830*       No results for input(s): \"PTP\", \"INR\" in the last 168 hours.               Microbiology    No results found for this visit on 24.      Imaging: Reviewed in Epic.    Medications:    aspirin  81 mg Oral Daily    rosuvastatin  20 mg Oral Nightly    metoprolol succinate  25 mg Oral Daily Beta Blocker    eptifibatide  180 mcg/kg Intravenous Once       Assessment & Plan:      # NSTEMI  - LHC with thrombus in  distal stenosis of the vein graft to the diagonal and PDA  - Integrilin per cardiology   - TTE with preserved EF, no significant valvular abnormalities   - Hx CABG 2004  - Cardiology on consult - pending interventional cardiology eval for further recommendations      # CAD   S/P CABG  -Will continue on aspirin and statin.    # Hypertension - Continue home oral antihypertensives  # Hyperlipidemia - continue home statin       Conrad Loya MD    Supplementary Documentation:     Quality:  DVT Mechanical Prophylaxis:   SCDs,    DVT Pharmacologic Prophylaxis   Medication   None         DVT Pharmacologic prophylaxis: Aspirin 81 mg      Code Status: Not on file  Davies: No urinary catheter in place  Davies Duration (in days):   Central line:    HUMBERTO: 7/9/2024    The 21st Century Cures Act makes medical notes like these available to patients in the interest of transparency. Please be advised this is a medical document. Medical documents are intended to carry relevant information, facts as evident, and the clinical opinion of the practitioner. The medical note is intended as peer to peer communication and may appear blunt or direct. It is written in medical language and may contain abbreviations or verbiage that are unfamiliar.

## 2024-07-10 ENCOUNTER — APPOINTMENT (OUTPATIENT)
Dept: INTERVENTIONAL RADIOLOGY/VASCULAR | Facility: HOSPITAL | Age: 64
End: 2024-07-10
Payer: COMMERCIAL

## 2024-07-10 LAB
ANION GAP SERPL CALC-SCNC: 11 MMOL/L (ref 0–18)
ANION GAP SERPL CALC-SCNC: 4 MMOL/L (ref 0–18)
APTT PPP: 40.4 SECONDS (ref 23–36)
APTT PPP: >240 SECONDS (ref 23–36)
ATRIAL RATE: 69 BPM
ATRIAL RATE: 73 BPM
BUN BLD-MCNC: 18 MG/DL (ref 9–23)
BUN BLD-MCNC: 20 MG/DL (ref 9–23)
CALCIUM BLD-MCNC: 9.1 MG/DL (ref 8.5–10.1)
CALCIUM BLD-MCNC: 9.1 MG/DL (ref 8.5–10.1)
CHLORIDE SERPL-SCNC: 105 MMOL/L (ref 98–112)
CHLORIDE SERPL-SCNC: 106 MMOL/L (ref 98–112)
CO2 SERPL-SCNC: 23 MMOL/L (ref 21–32)
CO2 SERPL-SCNC: 29 MMOL/L (ref 21–32)
CREAT BLD-MCNC: 0.82 MG/DL
CREAT BLD-MCNC: 0.86 MG/DL
EGFRCR SERPLBLD CKD-EPI 2021: 96 ML/MIN/1.73M2 (ref 60–?)
EGFRCR SERPLBLD CKD-EPI 2021: 97 ML/MIN/1.73M2 (ref 60–?)
ERYTHROCYTE [DISTWIDTH] IN BLOOD BY AUTOMATED COUNT: 13.5 %
GLUCOSE BLD-MCNC: 112 MG/DL (ref 70–99)
GLUCOSE BLD-MCNC: 139 MG/DL (ref 70–99)
HCT VFR BLD AUTO: 43.2 %
HGB BLD-MCNC: 14.5 G/DL
MAGNESIUM SERPL-MCNC: 2 MG/DL (ref 1.6–2.6)
MCH RBC QN AUTO: 29.4 PG (ref 26–34)
MCHC RBC AUTO-ENTMCNC: 33.6 G/DL (ref 31–37)
MCV RBC AUTO: 87.4 FL
OSMOLALITY SERPL CALC.SUM OF ELEC: 289 MOSM/KG (ref 275–295)
OSMOLALITY SERPL CALC.SUM OF ELEC: 295 MOSM/KG (ref 275–295)
P AXIS: 44 DEGREES
P AXIS: 59 DEGREES
P-R INTERVAL: 182 MS
P-R INTERVAL: 194 MS
PLATELET # BLD AUTO: 193 10(3)UL (ref 150–450)
POTASSIUM SERPL-SCNC: 3.9 MMOL/L (ref 3.5–5.1)
POTASSIUM SERPL-SCNC: 4 MMOL/L (ref 3.5–5.1)
Q-T INTERVAL: 436 MS
Q-T INTERVAL: 448 MS
QRS DURATION: 88 MS
QRS DURATION: 92 MS
QTC CALCULATION (BEZET): 480 MS
QTC CALCULATION (BEZET): 480 MS
R AXIS: -13 DEGREES
R AXIS: -19 DEGREES
RBC # BLD AUTO: 4.94 X10(6)UL
SODIUM SERPL-SCNC: 138 MMOL/L (ref 136–145)
SODIUM SERPL-SCNC: 140 MMOL/L (ref 136–145)
T AXIS: -35 DEGREES
T AXIS: -42 DEGREES
VENTRICULAR RATE: 69 BPM
VENTRICULAR RATE: 73 BPM
WBC # BLD AUTO: 8.8 X10(3) UL (ref 4–11)

## 2024-07-10 PROCEDURE — 027034Z DILATION OF CORONARY ARTERY, ONE ARTERY WITH DRUG-ELUTING INTRALUMINAL DEVICE, PERCUTANEOUS APPROACH: ICD-10-PCS | Performed by: INTERNAL MEDICINE

## 2024-07-10 PROCEDURE — 99232 SBSQ HOSP IP/OBS MODERATE 35: CPT | Performed by: STUDENT IN AN ORGANIZED HEALTH CARE EDUCATION/TRAINING PROGRAM

## 2024-07-10 RX ORDER — HEPARIN SODIUM AND DEXTROSE 10000; 5 [USP'U]/100ML; G/100ML
1000 INJECTION INTRAVENOUS ONCE
Status: COMPLETED | OUTPATIENT
Start: 2024-07-10 | End: 2024-07-10

## 2024-07-10 RX ORDER — MIDAZOLAM HYDROCHLORIDE 1 MG/ML
INJECTION INTRAMUSCULAR; INTRAVENOUS
Status: COMPLETED
Start: 2024-07-10 | End: 2024-07-10

## 2024-07-10 RX ORDER — MIDAZOLAM HYDROCHLORIDE 1 MG/ML
INJECTION INTRAMUSCULAR; INTRAVENOUS
Status: DISCONTINUED
Start: 2024-07-10 | End: 2024-07-10 | Stop reason: WASHOUT

## 2024-07-10 RX ORDER — HEPARIN SODIUM AND DEXTROSE 10000; 5 [USP'U]/100ML; G/100ML
INJECTION INTRAVENOUS CONTINUOUS
Status: DISCONTINUED | OUTPATIENT
Start: 2024-07-10 | End: 2024-07-11

## 2024-07-10 RX ORDER — CLOPIDOGREL BISULFATE 75 MG/1
75 TABLET ORAL DAILY
Status: DISCONTINUED | OUTPATIENT
Start: 2024-07-11 | End: 2024-07-11

## 2024-07-10 RX ORDER — CLOPIDOGREL BISULFATE 75 MG/1
TABLET ORAL
Status: COMPLETED
Start: 2024-07-10 | End: 2024-07-10

## 2024-07-10 RX ORDER — LIDOCAINE HYDROCHLORIDE 10 MG/ML
INJECTION, SOLUTION EPIDURAL; INFILTRATION; INTRACAUDAL; PERINEURAL
Status: COMPLETED
Start: 2024-07-10 | End: 2024-07-10

## 2024-07-10 RX ORDER — HEPARIN SODIUM 5000 [USP'U]/ML
INJECTION, SOLUTION INTRAVENOUS; SUBCUTANEOUS
Status: COMPLETED
Start: 2024-07-10 | End: 2024-07-10

## 2024-07-10 NOTE — PROGRESS NOTES
[facility]   part of [facility]     Hospitalist Progress Note     Doreen Steiner Patient Status:  Inpatient    1960 MRN [id number]   Location [facility] 2NE-A Attending Marsha Loya, DO   Hosp Day # 2 PCP Avis Weeks MD     Chief Complaint: Chest pain    Subjective:     Patient resting in bed and denies any chest pain, dyspnea on    Objective:    Review of Systems:   A comprehensive review of systems was completed; pertinent positive and negatives stated in subjective.    Vital signs:  Temp:  [97.8 °F (36.6 °C)-98.6 °F (37 °C)] 97.8 °F (36.6 °C)  Pulse:  [69-80] 69  Resp:  [16-20] 18  BP: (101-128)/(56-80) 104/68  SpO2:  [92 %-96 %] 96 %    Physical Exam:    General: No acute distress  Respiratory: No wheezes, no rhonchi  Cardiovascular: S1, S2, regular rate and rhythm  Abdomen: Soft, Non-tender, non-distended, positive bowel sounds  Neuro: No new focal deficits.   Extremities: No edema    Diagnostic Data:    Labs:  Recent Labs   Lab 24  0624   WBC 9.0 9.2   HGB 15.8 14.5   MCV 84.6 87.3   .0 213.0       Recent Labs   Lab 2424 24  1437 07/10/24  0647   * 159* 109* 139*   BUN 23 24* 19 20   CREATSERUM 0.80 0.81 0.97 0.82   CA 9.2 8.9 9.4 9.1   ALB 3.6  --   --   --     139 138 140   K 3.8 4.6 4.1 4.0    108 105 106   CO2 27.0 25.0 30.0 23.0   ALKPHO 84  --   --   --    AST 30  --   --   --    ALT 44  --   --   --    BILT 0.4  --   --   --    TP 7.3  --   --   --        Estimated Creatinine Clearance: 95.7 mL/min (based on SCr of 0.82 mg/dL).    Recent Labs   Lab 24  0047 24  0624   TROPHS 114* 760* 21,830*       No results for input(s): \"PTP\", \"INR\" in the last 168 hours.               Microbiology    No results found for this visit on 24.      Imaging: Reviewed in Epic.    Medications:    aspirin  81 mg Oral Daily    rosuvastatin  20 mg Oral Nightly    metoprolol succinate  25 mg  Oral Daily Beta Blocker    eptifibatide  180 mcg/kg Intravenous Once       Assessment & Plan:      #NSTEMI  #CAD s/p CABG in 2004  -S/p LHC on 7/8 which showed thrombus in distal stenosis of the vein graft to diagonal and PDA  -Plan for LHC again today  -Heparin drip  -Aspirin, statin, metoprolol  -Integrilin per cardiology  -Cardiology following    #Hypertension  -Metoprolol    #Hyperlipidemia  -Statin      Marsha Loya,     Supplementary Documentation:     Quality:  DVT Mechanical Prophylaxis:   SCDs,    DVT Pharmacologic Prophylaxis   Medication   None         DVT Pharmacologic prophylaxis: Aspirin 81 mg      Code Status: Not on file  Davies: No urinary catheter in place  Davies Duration (in days):   Central line:    HUMBERTO: 7/11/2024    Discharge is dependent on: Medical improvement  At this point Mr. Steiner is expected to be discharge to: Home    The 21st Century Cures Act makes medical notes like these available to patients in the interest of transparency. Please be advised this is a medical document. Medical documents are intended to carry relevant information, facts as evident, and the clinical opinion of the practitioner. The medical note is intended as peer to peer communication and may appear blunt or direct. It is written in medical language and may contain abbreviations or verbiage that are unfamiliar.

## 2024-07-10 NOTE — PROGRESS NOTES
Progress Note  Doreen Steiner Patient Status:  Inpatient    1960 MRN [id number]   Location [facility] 2NE-A Attending Marsha Loya, DO   Hosp Day # 2 PCP Avis Weeks MD     Subjective:  Doing well this morning. No chest pain. Notes mild reproducible shoulder pain today different than his presenting angina.  Continued frequent PVC's otherwise vitals WNL.      Objective:  Physical Exam:   /70 (BP Location: Left arm)   Pulse 78   Temp 98.6 °F (37 °C) (Oral)   Resp 18   Wt 227 lb 8.2 oz (103.2 kg)   SpO2 93%   BMI 31.73 kg/m²   Temp (24hrs), Av.2 °F (36.8 °C), Min:97.7 °F (36.5 °C), Max:98.6 °F (37 °C)       Intake/Output Summary (Last 24 hours) at 7/10/2024 0900  Last data filed at 7/10/2024 0840  Gross per 24 hour   Intake 548 ml   Output 0 ml   Net 548 ml     Wt Readings from Last 3 Encounters:   24 227 lb 8.2 oz (103.2 kg)   24 232 lb 4.8 oz (105.4 kg)   24 232 lb 3.2 oz (105.3 kg)     Telemetry: NSR frequent PVC's  1 episode NSVT  General: Alert and oriented lying comfortably in bed in no apparent distress lying comfortably in bed. No pallor, diaphoresis, or edema  HEENT: No focal deficits.  Neck: No JVD, carotids 2+ no bruits.  Cardiac: Regular rate and rhythm, S1, S2 normal, no murmur, rub or gallop.  Lungs: Clear without wheezes, rales, rhonchi or dullness.  Normal excursions and effort.  Abdomen: Soft, non-tender.   Extremities: Without clubbing, cyanosis or edema.  Peripheral pulses are 2+. Pinpoint tenderness of the L upper chest wall.  Neurologic: Alert and oriented, normal affect.  Skin: Warm and dry.        Intake/Output:    Intake/Output Summary (Last 24 hours) at 7/10/2024 0900  Last data filed at 7/10/2024 0840  Gross per 24 hour   Intake 548 ml   Output 0 ml   Net 548 ml       Last 3 Weights   24 0310 227 lb 8.2 oz (103.2 kg)   24 0240 227 lb 8.2 oz (103.2 kg)   24 0135 227 lb 8.2 oz (103.2 kg)   24 2247 232 lb (105.2 kg)    06/26/24 1723 232 lb 4.8 oz (105.4 kg)   03/02/24 0853 232 lb 3.2 oz (105.3 kg)       Labs:  Recent Labs   Lab 07/08/24  0624 07/09/24  1437 07/10/24  0647   * 109* 139*   BUN 24* 19 20   CREATSERUM 0.81 0.97 0.82   EGFRCR 98 87 97   CA 8.9 9.4 9.1    138 140   K 4.6 4.1 4.0    105 106   CO2 25.0 30.0 23.0     Recent Labs   Lab 07/07/24  2256 07/08/24  0624   RBC 5.38 4.96   HGB 15.8 14.5   HCT 45.5 43.3   MCV 84.6 87.3   MCH 29.4 29.2   MCHC 34.7 33.5   RDW 13.7 13.6   NEPRELIM 3.21 6.23   WBC 9.0 9.2   .0 213.0         Recent Labs   Lab 07/07/24 2256 07/08/24  0047 07/08/24  0624   TROPHS 114* 760* 21,830*       Diagnostics:   ECHOCARDIOGRAM 7/8/2024:  1. Left ventricle: The cavity size was normal. Wall thickness was normal.      Systolic function was at the lower limits of normal. The estimated      ejection fraction was 50-55%, by visual assessment. Unable to assess LV      diastolic function due to heart rhythm. Frequent PVCs made interpretation      of left ventricular function difficult.   2. Right ventricle: The cavity size was increased. Systolic function was      reduced.   3. Left atrium: The atrium was mildly dilated.   4. Aortic root: The aortic root was at the upper limits of normal.   5. Mitral valve: There was mild regurgitation.   Impressions:  No previous study was available for comparison.     CARDIAC CATHETERIZATION 7/8:  Occlusion of a skip saphenous vein graft to a diagonal, obtuse marginal branch, and PDA. The graft is 20 years old and is thrombotic from the takeoff of the ostium of the diagonal. The patient currently is asymptomatic and likely, as he presented to the hospital approximately 48 hours ago, has completed his infarct. To minimize the risk of further embolizing distal runoff, we chose not to proceed forward. He will be placed on a IIb/IIIa inhibitor, and his anatomy based on his clinical status will likely be reassessed in the next 24 to 48 hours.    Medications:   aspirin  81 mg Oral Daily    rosuvastatin  20 mg Oral Nightly    metoprolol succinate  25 mg Oral Daily Beta Blocker    eptifibatide  180 mcg/kg Intravenous Once      eptifibatide 2 mcg/kg/min (07/10/24 0651)       Assessment/Plan:    NSTEMI  Troponin peaked at 21,830  Presenting ECG w/ ST depression, TWI lateral leads  LVEF 50-55%  LHC w/ thrombotic occlusion of SVG to diag, OM, PDA as above on Integrilin plan for [facility] today with intervention planned  Prior CABG 2004  Chest wall pain  Reproducible and different than presenting angina  EKG without acute changes  Chronic HFimpEF prior 45-50% now normalized  Euvolemic on exam  Frequent ectopy  On BB  One episode of asymptomatic NSVT today  Electrolytes per cardiac protocol  Consider amiodarone if fails to improve after intervention or worsens prior  HLD  On statin, LDL 48      PLAN   1. LHC today  2. Electrolytes per cardiac protocol  3. Continue BB, ASA, statin  4. Remains on Integrilin pending intervention  5. Consider amiodarone if no improvement in ectopy following intervention        Chris Garcia PA-C  7/10/2024  9:00 AM   For PCI today

## 2024-07-10 NOTE — PROGRESS NOTES
RFA  Angio flow to OM and distal RCA.  90% lesion at diag with trhromus  Did not want to embolize  Pre dil with 3.0 balloon then 4.5 x 23 stent  I id not post dil because of concern of embolization  Home on Eliquis and plavix  Needs  RCA and OM in next few weeks.

## 2024-07-10 NOTE — PLAN OF CARE
Received pt at approx 0730.  Pt is A&Ox4, complaints of L shoulder pain- reproducible/does state that it feels different then the original chest pain he arrived with- notified cardiology- EKG obtained, vitals/electrolytes are WNL. Pt is on room air, lungs are clear/diminished, no coughing. Pt is in NSR, PVC's present. Continent of B&B, active bowel sounds, abdomen non-tender, last BM 7-9. R groin incision in place, soft, no drainage, dressing in place. Pt updated with plan of care.     Approx 1340-- pt returned from cath lab, R groin incision in place, soft, no drainage. EKG to be completed post MARINA.       Problem: Patient/Family Goals  Goal: Patient/Family Long Term Goal  Description: Patient's Long Term Goal: Return to baseline  Stay out of hospital 7-10    Interventions:  - Discharge home with completed treatment  - med compliance, follow ups    - See additional Care Plan goals for specific interventions  Outcome: Progressing     Problem: PAIN - ADULT  Goal: Verbalizes/displays adequate comfort level or patient's stated pain goal  Description: INTERVENTIONS:  - Encourage pt to monitor pain and request assistance  - Assess pain using appropriate pain scale  - Administer analgesics based on type and severity of pain and evaluate response  - Implement non-pharmacological measures as appropriate and evaluate response  - Consider cultural and social influences on pain and pain management  - Manage/alleviate anxiety  - Utilize distraction and/or relaxation techniques  - Monitor for opioid side effects  - Notify MD/LIP if interventions unsuccessful or patient reports new pain  - Anticipate increased pain with activity and pre-medicate as appropriate  Outcome: Progressing     Problem: CARDIOVASCULAR - ADULT  Goal: Maintains optimal cardiac output and hemodynamic stability  Description: INTERVENTIONS:  - Monitor vital signs, rhythm, and trends  - Monitor for bleeding, hypotension and signs of decreased cardiac output  -  Evaluate effectiveness of vasoactive medications to optimize hemodynamic stability  - Monitor arterial and/or venous puncture sites for bleeding and/or hematoma  - Assess quality of pulses, skin color and temperature  - Assess for signs of decreased coronary artery perfusion - ex. Angina  - Evaluate fluid balance, assess for edema, trend weights  Outcome: Progressing  Goal: Absence of cardiac arrhythmias or at baseline  Description: INTERVENTIONS:  - Continuous cardiac monitoring, monitor vital signs, obtain 12 lead EKG if indicated  - Evaluate effectiveness of antiarrhythmic and heart rate control medications as ordered  - Initiate emergency measures for life threatening arrhythmias  - Monitor electrolytes and administer replacement therapy as ordered  Outcome: Progressing

## 2024-07-10 NOTE — PLAN OF CARE
Assumed care around 1930.    Patient alert and orientated x 4. Maintaining oxygen saturations on room air. Normal sinus on tele monitor. Continent of bowel/bladder. No complaints of pain. Patient updated with plan of care. Bed locked in lowest position. Call light and personal belongings within reach.    Problem: Patient/Family Goals  Goal: Patient/Family Long Term Goal  Description: Patient's Long Term Goal: Return to baseline  Interventions:  - Discharge home with completed treatment  - See additional Care Plan goals for specific interventions  Outcome: Progressing  Goal: Patient/Family Short Term Goal  Description: Patient's Short Term Goal: Experience no complaint of chest pain  Interventions:   - Monitor troponin levels  - Consulted with cardiology  - See additional Care Plan goals for specific interventions  Outcome: Progressing

## 2024-07-11 VITALS
TEMPERATURE: 98 F | HEART RATE: 76 BPM | WEIGHT: 227.5 LBS | BODY MASS INDEX: 32 KG/M2 | RESPIRATION RATE: 20 BRPM | OXYGEN SATURATION: 96 % | SYSTOLIC BLOOD PRESSURE: 113 MMHG | DIASTOLIC BLOOD PRESSURE: 72 MMHG

## 2024-07-11 LAB
APTT PPP: 43.7 SECONDS (ref 23–36)
APTT PPP: 44.3 SECONDS (ref 23–36)
PLATELET # BLD AUTO: 219 10(3)UL (ref 150–450)

## 2024-07-11 PROCEDURE — 99239 HOSP IP/OBS DSCHRG MGMT >30: CPT | Performed by: STUDENT IN AN ORGANIZED HEALTH CARE EDUCATION/TRAINING PROGRAM

## 2024-07-11 RX ORDER — METOPROLOL SUCCINATE 50 MG/1
50 TABLET, EXTENDED RELEASE ORAL
Status: DISCONTINUED | OUTPATIENT
Start: 2024-07-12 | End: 2024-07-11

## 2024-07-11 RX ORDER — METOPROLOL SUCCINATE 25 MG/1
25 TABLET, EXTENDED RELEASE ORAL ONCE
Status: COMPLETED | OUTPATIENT
Start: 2024-07-11 | End: 2024-07-11

## 2024-07-11 RX ORDER — CLOPIDOGREL BISULFATE 75 MG/1
75 TABLET ORAL DAILY
Qty: 30 TABLET | Refills: 3 | Status: SHIPPED | OUTPATIENT
Start: 2024-07-12

## 2024-07-11 RX ORDER — CLOPIDOGREL BISULFATE 75 MG/1
75 TABLET ORAL DAILY
Status: DISCONTINUED | OUTPATIENT
Start: 2024-07-11 | End: 2024-07-11

## 2024-07-11 RX ORDER — METOPROLOL SUCCINATE 50 MG/1
50 TABLET, EXTENDED RELEASE ORAL
Qty: 30 TABLET | Refills: 3 | Status: SHIPPED | OUTPATIENT
Start: 2024-07-12

## 2024-07-11 NOTE — PLAN OF CARE
Received pt at 0730.   Pt is A&Ox4, complaints of chest tenderness, per pt \"feels better then yesterday,\" PRN tylenol provided. Pt is on room air, lungs are clear/diminished, no coughing. Pt is in NSR, PVC. Continent of B&B, active bowel sounds, abdomen non-tender, last BM 7-9. Heparin infusing. R groin incision present, soft, no drainage, dressing in place. Pt refusing bed alarm- explained safety protocols and risks of keeping bed alarm off- pt continues to refuse. Pt updated with plan of care.     Problem: Patient/Family Goals  Goal: Patient/Family Long Term Goal  Description: Patient's Long Term Goal: Return to baseline  Stay out of hospital 7-10    Interventions:  - Discharge home with completed treatment  - med compliance, follow ups    - See additional Care Plan goals for specific interventions  Outcome: Progressing  Goal: Patient/Family Short Term Goal  Description: Patient's Short Term Goal: Experience no complaint of chest pain  No pain 7-10  Go home today 7-11    Interventions:   - Monitor troponin levels  - Consulted with cardiology  - PRN meds, hot/cold packs  - round & discuss procedure/future plans with cards, discuss follow up information   - See additional Care Plan goals for specific interventions  Outcome: Progressing     Problem: PAIN - ADULT  Goal: Verbalizes/displays adequate comfort level or patient's stated pain goal  Description: INTERVENTIONS:  - Encourage pt to monitor pain and request assistance  - Assess pain using appropriate pain scale  - Administer analgesics based on type and severity of pain and evaluate response  - Implement non-pharmacological measures as appropriate and evaluate response  - Consider cultural and social influences on pain and pain management  - Manage/alleviate anxiety  - Utilize distraction and/or relaxation techniques  - Monitor for opioid side effects  - Notify MD/LIP if interventions unsuccessful or patient reports new pain  - Anticipate increased pain with  activity and pre-medicate as appropriate  Outcome: Progressing     Problem: CARDIOVASCULAR - ADULT  Goal: Maintains optimal cardiac output and hemodynamic stability  Description: INTERVENTIONS:  - Monitor vital signs, rhythm, and trends  - Monitor for bleeding, hypotension and signs of decreased cardiac output  - Evaluate effectiveness of vasoactive medications to optimize hemodynamic stability  - Monitor arterial and/or venous puncture sites for bleeding and/or hematoma  - Assess quality of pulses, skin color and temperature  - Assess for signs of decreased coronary artery perfusion - ex. Angina  - Evaluate fluid balance, assess for edema, trend weights  Outcome: Progressing  Goal: Absence of cardiac arrhythmias or at baseline  Description: INTERVENTIONS:  - Continuous cardiac monitoring, monitor vital signs, obtain 12 lead EKG if indicated  - Evaluate effectiveness of antiarrhythmic and heart rate control medications as ordered  - Initiate emergency measures for life threatening arrhythmias  - Monitor electrolytes and administer replacement therapy as ordered  Outcome: Progressing

## 2024-07-11 NOTE — PLAN OF CARE
Pt is ok to discharge per primary and consults. Discharge instructions including meds & follow ups given. Patient verbalizes understanding & questions answered. IV removed, tele monitor discontinued, all belongings taken with patient. R groin incision in place, soft, no drainage, dressing on, can be removed this evening. Pt transported off unit via wheelchair to [facility].

## 2024-07-11 NOTE — PLAN OF CARE
Assumed care at 1930.     Patient alert and orientated x 4. Maintaining oxygen saturations on room air. Normal sinus on tele monitor. Continent of bowel/bladder. Endorsed mild pain; PRN Tylenol given per order. Right groin site soft no hematoma, dressing clean/dry. Heparin gtt running per order. Patient updated with plan of care. Bed locked in lowest position. Call light and personal belongings within reach.    Problem: Patient/Family Goals  Goal: Patient/Family Long Term Goal  Description: Patient's Long Term Goal: Return to baseline  Stay out of hospital 7-10    Interventions:  - Discharge home with completed treatment  - med compliance, follow ups    - See additional Care Plan goals for specific interventions  Outcome: Progressing  Goal: Patient/Family Short Term Goal  Description: Patient's Short Term Goal: Experience no complaint of chest pain  No pain 7-10    Interventions:   - Monitor troponin levels  - Consulted with cardiology  - PRN meds, hot/cold packs  - See additional Care Plan goals for specific interventions  Outcome: Progressing     Problem: PAIN - ADULT  Goal: Verbalizes/displays adequate comfort level or patient's stated pain goal  Description: INTERVENTIONS:  - Encourage pt to monitor pain and request assistance  - Assess pain using appropriate pain scale  - Administer analgesics based on type and severity of pain and evaluate response  - Implement non-pharmacological measures as appropriate and evaluate response  - Consider cultural and social influences on pain and pain management  - Manage/alleviate anxiety  - Utilize distraction and/or relaxation techniques  - Monitor for opioid side effects  - Notify MD/LIP if interventions unsuccessful or patient reports new pain  - Anticipate increased pain with activity and pre-medicate as appropriate  Outcome: Progressing     Problem: CARDIOVASCULAR - ADULT  Goal: Maintains optimal cardiac output and hemodynamic stability  Description: INTERVENTIONS:  -  Monitor vital signs, rhythm, and trends  - Monitor for bleeding, hypotension and signs of decreased cardiac output  - Evaluate effectiveness of vasoactive medications to optimize hemodynamic stability  - Monitor arterial and/or venous puncture sites for bleeding and/or hematoma  - Assess quality of pulses, skin color and temperature  - Assess for signs of decreased coronary artery perfusion - ex. Angina  - Evaluate fluid balance, assess for edema, trend weights  Outcome: Progressing  Goal: Absence of cardiac arrhythmias or at baseline  Description: INTERVENTIONS:  - Continuous cardiac monitoring, monitor vital signs, obtain 12 lead EKG if indicated  - Evaluate effectiveness of antiarrhythmic and heart rate control medications as ordered  - Initiate emergency measures for life threatening arrhythmias  - Monitor electrolytes and administer replacement therapy as ordered  Outcome: Progressing

## 2024-07-11 NOTE — PROGRESS NOTES
[facility]   part of [facility]     Hospitalist Progress Note     Doreen Steiner Patient Status:  Inpatient    1960 MRN [id number]   Location [facility] 2NE-A Attending Marsha Loya,    Hosp Day # 3 PCP Avis Weeks MD     Chief Complaint: Chest pain    Subjective:     Patient resting in bed and denies any chest pain, dyspnea     Objective:    Review of Systems:   A comprehensive review of systems was completed; pertinent positive and negatives stated in subjective.    Vital signs:  Temp:  [97.4 °F (36.3 °C)-99 °F (37.2 °C)] 98 °F (36.7 °C)  Pulse:  [63-76] 76  Resp:  [18-22] 20  BP: (100-130)/(64-99) 113/72  SpO2:  [92 %-98 %] 96 %    Physical Exam:    General: No acute distress  Respiratory: No wheezes, no rhonchi  Cardiovascular: S1, S2, regular rate and rhythm  Abdomen: Soft, Non-tender, non-distended, positive bowel sounds  Neuro: No new focal deficits.   Extremities: No edema    Diagnostic Data:    Labs:  Recent Labs   Lab 24  0624 07/10/24  1358 24  0556   WBC 9.0 9.2 8.8  --    HGB 15.8 14.5 14.5  --    MCV 84.6 87.3 87.4  --    .0 213.0 193.0 219.0       Recent Labs   Lab 2424 24  1437 07/10/24  0647 07/10/24  1358   *   < > 109* 139* 112*   BUN 23   < > 19 20 18   CREATSERUM 0.80   < > 0.97 0.82 0.86   CA 9.2   < > 9.4 9.1 9.1   ALB 3.6  --   --   --   --       < > 138 140 138   K 3.8   < > 4.1 4.0 3.9      < > 105 106 105   CO2 27.0   < > 30.0 23.0 29.0   ALKPHO 84  --   --   --   --    AST 30  --   --   --   --    ALT 44  --   --   --   --    BILT 0.4  --   --   --   --    TP 7.3  --   --   --   --     < > = values in this interval not displayed.       Estimated Creatinine Clearance: 91.2 mL/min (based on SCr of 0.86 mg/dL).    Recent Labs   Lab 24  2256 24  0047 24  0624   TROPHS 114* 760* 21,830*       No results for input(s): \"PTP\", \"INR\" in the last 168 hours.                Microbiology    No results found for this visit on 07/07/24.      Imaging: Reviewed in Epic.    Medications:    [START ON 7/12/2024] metoprolol succinate  50 mg Oral Daily Beta Blocker    apixaban  5 mg Oral BID    clopidogrel  75 mg Oral Daily    rosuvastatin  20 mg Oral Nightly    eptifibatide  180 mcg/kg Intravenous Once       Assessment & Plan:      #NSTEMI  #CAD s/p CABG in 2004  -S/p LHC on 7/8 which showed thrombus in distal stenosis of the vein graft to diagonal and PDA  -S/p PCI and MARINA of diagnoal and thrombus on 7/10  -Aspirin, statin, metoprolol  -Plavix, Eliquis   -Integrilin per cardiology  -Cardiology following    #Hypertension  -Metoprolol    #Hyperlipidemia  -Statin    DC Planning     Marsha Loya,     Supplementary Documentation:     Quality:  DVT Mechanical Prophylaxis:   SCDs,    DVT Pharmacologic Prophylaxis   Medication    apixaban (Eliquis) tab 5 mg                Code Status: Not on file  Davies: No urinary catheter in place  Davies Duration (in days):   Central line:    HUMBERTO: 7/11/2024    Discharge is dependent on: Medical improvement  At this point Mr. Steiner is expected to be discharge to: Home    The 21st Century Cures Act makes medical notes like these available to patients in the interest of transparency. Please be advised this is a medical document. Medical documents are intended to carry relevant information, facts as evident, and the clinical opinion of the practitioner. The medical note is intended as peer to peer communication and may appear blunt or direct. It is written in medical language and may contain abbreviations or verbiage that are unfamiliar.

## 2024-07-11 NOTE — CARDIAC REHAB
Cardiac rehab RN saw patient for CAD education post stent.  Stent card given to patient.  Phase 2 CR previously discussed and offered to patient. He will think about it.  To return for PCI in another week or so.

## 2024-07-11 NOTE — DISCHARGE SUMMARY
[facility]     Doreen Steiner Patient Status:  Inpatient    1960 MRN [id number]   Location [facility] 2NE-A Attending No att. providers found   Hosp Day # 3 PCP Avis Weeks MD     Date of Admission: 2024  Date of Discharge:  2024     Discharge Disposition: Home or Self Care    Discharge Diagnosis:  #NSTEMI  #CAD s/p CABG in   #Hypertension  #Hyperlipidemia    History of Present Illness: Doreen Steiner is a 64 year old male with history of coronary disease status post 3 vessel CABG , hyperlipidemia next emergency room with chest pain that started this evening that woke him up from sleep.        Brief Synopsis: Cardiology was consulted.  Patient underwent left heart cath on  which showed thrombus in distal stenosis of the vein graft diagonal and PDA.  Patient around PCI and MARINA of diagonal and thrombus on 7/10.  He was started on aspirin statin metoprolol Plavix Eliquis.  Cardiology recommended discharge home with outpatient follow-up.    Lace+ Score: 50  59-90 High Risk  29-58 Medium Risk  0-28   Low Risk  Patient was referred to the [facility].    TCM Follow-Up Recommendation:  LACE 29-58: Moderate Risk of readmission after discharge from the hospital.      Procedures during hospitalization:   LHC, PCI    Incidental or significant findings and recommendations (brief descriptions):  See brief synopsis above    Lab/Test results pending at Discharge:   None    Consultants:  Cardiology    Discharge Medication List:     Discharge Medications        START taking these medications        Instructions Prescription details   apixaban 5 MG Tabs  Commonly known as: Eliquis      Take 1 tablet (5 mg total) by mouth 2 (two) times daily.   Quantity: 60 tablet  Refills: 0     clopidogrel 75 MG Tabs  Commonly known as: Plavix      Take 1 tablet (75 mg total) by mouth daily.   Quantity: 30 tablet  Refills: 3     metoprolol succinate ER 50 MG  Tb24  Commonly known as: Toprol XL      Take 1 tablet (50 mg total) by mouth Daily Beta Blocker.   Quantity: 30 tablet  Refills: 3            CONTINUE taking these medications        Instructions Prescription details   rosuvastatin 20 MG Tabs  Commonly known as: Crestor      Take 1 tablet (20 mg total) by mouth nightly.   Refills: 0     Rybelsus 14 MG Tabs  Generic drug: Semaglutide      Take 14 mg by mouth daily.   Stop taking on: 2024  Quantity: 30 tablet  Refills: 2            STOP taking these medications      aspirin 81 MG Tbec                  Where to Get Your Medications        [facility] [address] [phone], [phone]  [address] [place], [place] [address], [place] [address], [facility] [address]      Phone: [phone]   apixaban 5 MG Tabs  clopidogrel 75 MG Tabs  metoprolol succinate ER 50 MG Tb24         [place]    Follow-up appointment:   Avis Weeks MD  [address] [place] [address] [phone]    Schedule an appointment as soon as possible for a visit      Chepe Ramos MD  [address] [place] [address] [phone]    Follow up in 1 week(s)  Office will call you for follow up appt    Appointments for Next 30 Days 2024 - 2024      None            Vital signs:       Physical Exam:    General: No acute distress   Lungs: clear to auscultation  Cardiovascular: S1, S2  Abdomen: Soft      -----------------------------------------------------------------------------------------------  PATIENT DISCHARGE INSTRUCTIONS: See electronic chart    Marsha Loya DO    Total time spent on discharge plannin minutes     The  Cures Act makes medical notes like these available to patients in the interest of transparency. Please be advised this is a medical document. Medical documents are intended to carry relevant information, facts as evident, and the clinical  opinion of the practitioner. The medical note is intended as peer to peer communication and may appear blunt or direct. It is written in medical language and may contain abbreviations or verbiage that are unfamiliar.

## 2024-07-11 NOTE — PROGRESS NOTES
Progress Note  Doreen Steiner Patient Status:  Inpatient    1960 MRN [id number]   Location [facility] 2NE-A Attending Marsha Loya, DO   Hosp Day # 3 PCP Avis Weeks MD     Subjective:  No acute events overnight.  Ambulating around the room, on room air, denies any cardiac symptoms currently.  Wants to go home.    Objective:  /64 (BP Location: Left arm)   Pulse 72   Temp 97.7 °F (36.5 °C) (Oral)   Resp 18   Wt 227 lb 8.2 oz (103.2 kg)   SpO2 95%   BMI 31.73 kg/m²     Telemetry: SR, HR 70s, PVCs      Intake/Output:    Intake/Output Summary (Last 24 hours) at 2024 1012  Last data filed at 2024 0815  Gross per 24 hour   Intake 300 ml   Output 550 ml   Net -250 ml       Last 3 Weights   24 0310 227 lb 8.2 oz (103.2 kg)   24 0240 227 lb 8.2 oz (103.2 kg)   24 0135 227 lb 8.2 oz (103.2 kg)   24 2247 232 lb (105.2 kg)   24 1723 232 lb 4.8 oz (105.4 kg)   24 0853 232 lb 3.2 oz (105.3 kg)       Labs:  Recent Labs   Lab 24  1437 07/10/24  0647 07/10/24  1358   * 139* 112*   BUN 19 20 18   CREATSERUM 0.97 0.82 0.86   EGFRCR 87 97 96   CA 9.4 9.1 9.1    140 138   K 4.1 4.0 3.9    106 105   CO2 30.0 23.0 29.0     Recent Labs   Lab 24  2256 24  0624 07/10/24  1358 24  0556   RBC 5.38 4.96 4.94  --    HGB 15.8 14.5 14.5  --    HCT 45.5 43.3 43.2  --    MCV 84.6 87.3 87.4  --    MCH 29.4 29.2 29.4  --    MCHC 34.7 33.5 33.6  --    RDW 13.7 13.6 13.5  --    NEPRELIM 3.21 6.23  --   --    WBC 9.0 9.2 8.8  --    .0 213.0 193.0 219.0         Recent Labs   Lab 24  2256 24  0047 24  0624   TROPHS 114* 760* 21,830*       Review of Systems   Constitutional: Negative.   Cardiovascular: Negative.    Respiratory: Negative.         Physical Exam:    Gen: alert, oriented x 3, NAD  Heent: pupils equal, reactive. Mucous membranes moist.   Neck: no jvd  Cardiac: regular rate and rhythm, normal S1,S2, no  murmur, gallop or rub   Lungs: CTA  Abd: soft, NT/ND +bs  Ext: no edema, right groin c,d,I, no hematoma   Skin: Warm, dry  Neuro: No focal deficits        Medications:     clopidogrel  75 mg Oral Daily    rosuvastatin  20 mg Oral Nightly    metoprolol succinate  25 mg Oral Daily Beta Blocker    eptifibatide  180 mcg/kg Intravenous Once      continuous dose heparin 1,400 Units/hr (07/11/24 0854)       Assessment:  NSTEMI s/p LHC 7/10 showed 90% lesion of diag with thrombus s/p PCI and MARINA  On plavix, heparin gtt, plan to switch to eliquis prior dc  Needs  RCA and OM as OP in couple weeks   Echo 7/8/24 with preserved LVEF 50-55%    CAD with hx of CABG 2004  On asa, statin, bb  Chronic HF with now improved EF 50-55% (prior EF 45-50%)  On toprol  NSVT  On bb  HLP-on statin      Plan:  POD #1 s/p PCI and MARINA of diag and thrombus-doing well, denies cardiac symptoms.  Continue plavix, heparin gtt-swatting SW to price out eliquis prior dc per Dr Johnson recs.   Will increase BB dose since still having frequent PVCs, remains asymptomatic.   Continue statin.  Plan to dc home today, will schedule follow up with Dr Ramos in 1 week.  Plans for OP  RCA, OM in few weeks.     Plan of care discussed with patient, RN.    Giovanna Aguilar, MICHOACANO  7/11/2024  10:12 AM  [phone]      Patient seen and examined independently.  Note reviewed and labs reviewed.  Agree with above assessment and plan.  Home today with plan for staged outpatient intervention. Uninterrupted DAPT for 12 months.  Zcontinue statin.    JACOB Ramos MD

## 2024-07-12 ENCOUNTER — PATIENT OUTREACH (OUTPATIENT)
Dept: CASE MANAGEMENT | Age: 64
End: 2024-07-12

## 2024-07-12 ENCOUNTER — TELEPHONE (OUTPATIENT)
Dept: FAMILY MEDICINE CLINIC | Facility: CLINIC | Age: 64
End: 2024-07-12

## 2024-07-12 NOTE — PROGRESS NOTES
LM for pt to call [facility] for TCM since discharge. NCM phone number was provided for pt to call back.    [facility] contact information was provided for pt to call back as well.  TE will be sent to PCP office to FU on HFU appt.

## 2024-07-12 NOTE — PROGRESS NOTES
LM for pt to call [facility] for TCM since discharge. NCM phone number was provided for pt to call back.    TCC contact information was provided for pt to call back as well.

## 2024-07-12 NOTE — TELEPHONE ENCOUNTER
Attempted to contact patient for TCM today however no answer.  Patient does not have a TCM appointment scheduled at this time.  Per guidelines patient should  be seen within seven days by 7/18/24.     Otherwise, last date for TCM: 7/25/24     Clinical staff:  Please follow-up with patient and try to get them to schedule as patient would greatly benefit from a TCM appointment.  Thank you!

## 2024-07-15 NOTE — PAT NURSING NOTE
Per PAT encounter/MyChart message sent to pt/took notes:    PreOp Instructions     You are scheduled for: a Cardiac Procedure     Date of Procedure: 07/18/24 Thursday     Diet Instructions: Do not eat or drink anything after midnight     Medications: Medications you are allowed to take can be taken with a sip of water the morning of your procedure, Take Plavix 75mg the day of your procedure (make sure you take your dose every day including the morning of the procedure Thursday 7/18).     Do not take the following Blood Thinner(s): Last dose of Eliquis will be Tonight (Monday evening 7/15); Do NOT take your doses Tuesday 7/16, Wednesday 7/17, and Thursday morning 7/18.    Diabetic Instructions: Do not take morning dose of your diabetic medication (Rybelsus) on Thursday 7/18.     Skin Prep: Shower with antibacterial soap using a clean washcloth, prior to procedure     Arrival Time: The day prior to your procedure (Wednesday) you will receive a phone call before 6:00 pm with your arrival time. If you haven't received a phone call, please check your voicemail messages., If you did not receive a voice mail and it is after 6:00 pm, please call the nursing supervisor at [phone].    Driving After Procedure: If sedation is given, you WILL NOT be able to drive home. You will need a responsible adult  to drive you home., Cannot take uber or cab unless approved by physician     Discharge Teaching: Your nurse will give you specific instructions before discharge, Most people can resume normal activities in 2-3 days, Any questions, please call the physician's office      parking is available starting at 6 am or park in the [place] parking garage at [facility]. Check in at the [facility] reception desk. Our  will be there to check you in for your procedure. Please bring your insurance cards and ID with you.                                                                                Received a fax cancelling OPO (overnight oximetry) for this patient, because they are unable to contact the patient to schedule. Scanned fax to [employer].     Sent to M[facility] and provider.                                                           Please DO NOT respond to this message, the inbasket is not monitored for messages. For any questions, please call the physician's office.    Of note: Pt stated will work on ride; wife/daughter in [place] so needs time to line up a ride. Discussed importance of not taking Eliquis Tues/Wed/Thurs (am) but taking Plavix still including morning of procedure 7/18. Pt verbalized understanding of the conversation. Provided [facility] phone # in case pt needs to reschedule his case d/t no ride.

## 2024-07-15 NOTE — H&P
Chepe Ramos MD   Physician  Cardiology     Consults     Addendum     Date of Service: 2024  9:34 AM  Consult Orders   Consult to Cardiology [[id number]] ordered by Richardson Tobar DO at [phone]          Expand All Collapse [facility]  Cardiology Consultation           Doreen Steiner Patient Status:  Inpatient    1960 MRN [id number]   Location [facility] 2NE-A Attending Conrad Loya, *   Hosp Day # 0 PCP Avis Weeks MD      Reason for Consultation:  NSTEMI     History of Present Illness:  Droeen Steiner is a a(n) 64 year old male with hx of CAD with remote CABG (4 vessel CABG in ), mild cardiomyopathy with LVEF 45-50%, HTN and HLP who presents with chest pain.  Felt severe midsternal chest pressure last night.  Now resolved. Diagnosed with NSTEMI in ED and started on IV heparin.  Now CP free.     ECG shows sinus rhythm with frequent PVCs, tele with frequent PVCs at times in pattern of bigeminy and short runs of non-sustained VT  History:  Past Medical History       Past Medical History:    Acute, but ill-defined, cerebrovascular disease    Back pain    CAD (coronary artery disease)         Essential hypertension    Hyperlipidemia    Stress    Weight gain         Past Surgical History         Past Surgical History:   Procedure Laterality Date    Appendectomy        Bypass surgery   2004     x4 , 2004         Family History         Family History   Problem Relation Age of Onset    Other (etoh) Father      Cancer Mother           lung ca          reports that he has never smoked. He has never used smokeless tobacco. He reports that he does not drink alcohol and does not use drugs.     Allergies:  Allergies   No Known Allergies        Medications:    Current Hospital Medications      Current Facility-Administered Medications:     aspirin DR tab 81 mg, 81 mg, Oral, Daily    rosuvastatin (Crestor) tab 20 mg, 20 mg, Oral, Nightly    melatonin tab 3  mg, 3 mg, Oral, Nightly PRN    ondansetron (Zofran) 4 MG/2ML injection 4 mg, 4 mg, Intravenous, Q6H PRN    metoclopramide (Reglan) 5 mg/mL injection 10 mg, 10 mg, Intravenous, Q8H PRN    acetaminophen (Tylenol Extra Strength) tab 500 mg, 500 mg, Oral, Q4H PRN    polyethylene glycol (PEG 3350) (Miralax) 17 g oral packet 17 g, 17 g, Oral, Daily PRN    sennosides (Senokot) tab 17.2 mg, 17.2 mg, Oral, Nightly PRN    bisacodyl (Dulcolax) 10 MG rectal suppository 10 mg, 10 mg, Rectal, Daily PRN    fleet enema (Fleet) 7-19 GM/118ML rectal enema 133 mL, 1 enema, Rectal, Once PRN    heparin (Porcine) 39716 units/250mL infusion ACS/AFIB CONTINUOUS, 200-3,000 Units/hr, Intravenous, Continuous        Review of Systems:  A comprehensive review of systems was negative if not otherwise mention in above HPI.     /80 (BP Location: Left arm)   Pulse 91   Temp 97.6 °F (36.4 °C) (Oral)   Resp 18   Wt 227 lb 8.2 oz   SpO2 96%   BMI 31.73 kg/m²   Temp (24hrs), Av.7 °F (36.5 °C), Min:97.4 °F (36.3 °C), Max:98.2 °F (36.8 °C)        Intake/Output Summary (Last 24 hours) at 2024 0934  Last data filed at 2024 0840      Gross per 24 hour   Intake 60 ml   Output 1 ml   Net 59 ml          Wt Readings from Last 3 Encounters:   24 227 lb 8.2 oz   24 232 lb 4.8 oz   24 232 lb 3.2 oz         Physical Exam:   General: Alert and oriented x 3.   HEENT: Normocephalic  Neck: No JVD  Cardiac: Regular rate and rhythm. S1, S2 normal. No murmur, pericardial rub, S3.  Lungs: Clear anteriorly  Extremities: Without edema  Neurologic: Alert and oriented, normal affect.  Skin: Warm and dry.      Laboratory Data:        Lab Results   Component Value Date     WBC 9.2 2024     HGB 14.5 2024     HCT 43.3 2024     .0 2024     CREATSERUM 0.81 2024     BUN 24 2024      2024     K 4.6 2024      2024     CO2 25.0 2024      2024     CA 8.9  07/08/2024     ALB 3.6 07/07/2024     ALKPHO 84 07/07/2024     BILT 0.4 07/07/2024     TP 7.3 07/07/2024     AST 30 07/07/2024     ALT 44 07/07/2024     PTT 46.4 07/08/2024         Imaging:  CXR yesterday:  CONCLUSION:    No acute process.  Lungs are clear.  Stable changes of prior CABG.   Impression:  Principal Problem:    NSTEMI (non-ST elevated myocardial infarction) ([facility])  Active Problems:    Acute chest pain    Essential hypertension    Hyperlipidemia    CAD (coronary artery disease)  Prior CABG  Heart failure with recovered LVEF 45-50%     Recommendations:  - continue IV heparin  - no need for nitrog gtt as he is CP free at present  - plan for [facility] with possible PCI this AM with Fabián Ribera/Bobo  - continue statin and ASA  - will add Toprol for PVCs/VT  - further recs to follow     Patient was consented for left heart cardiac catheterization. The risks and benefits of the procedure were explained to the patient. 1-2% risk of coronary angiography, possible angioplasty, include, but are not limited to stroke, death, heart attack, coronary dissection requiring emergent CABG, hematoma, bleeding, allergic reaction to medications, vascular damage requiring surgical repair, kidney failure requiring dialysis and others. Patient wishes to proceed.        Thank you for allowing me to participate in the care of your patient.     Chepe Ramos MD  7/8/2024  9:34 AM         Electronically signed by Chepe Ramos MD at 7/8/2024  9:48 AM         ED to Hosp-Admission (Discharged) on 7/7/2024            Revision History            Detailed Report          Note shared with patient  Chart Review: Note Routing History    Patient seen and examined. Changes as outlined below.    Cath showing thrombus in SVG to D1>OM>PDA. Treated with Integrillin followed by PCI of SVG 2 days later.    Plan for PCI  OM and  RCA today.

## 2024-07-18 ENCOUNTER — HOSPITAL ENCOUNTER (OUTPATIENT)
Dept: INTERVENTIONAL RADIOLOGY/VASCULAR | Facility: HOSPITAL | Age: 64
Setting detail: OBSERVATION
Discharge: HOME | End: 2024-07-19
Attending: INTERNAL MEDICINE | Admitting: INTERNAL MEDICINE
Payer: COMMERCIAL

## 2024-07-18 DIAGNOSIS — I25.82 CHRONIC TOTAL OCCLUSION OF CORONARY ARTERY: ICD-10-CM

## 2024-07-18 LAB
GLUCOSE BLD-MCNC: 119 MG/DL (ref 70–99)
ISTAT ACTIVATED CLOTTING TIME: 244 SECONDS (ref 74–137)
ISTAT ACTIVATED CLOTTING TIME: 281 SECONDS (ref 74–137)
ISTAT ACTIVATED CLOTTING TIME: 317 SECONDS (ref 74–137)
ISTAT ACTIVATED CLOTTING TIME: 348 SECONDS (ref 74–137)

## 2024-07-18 PROCEDURE — 93005 ELECTROCARDIOGRAM TRACING: CPT

## 2024-07-18 PROCEDURE — 96375 TX/PRO/DX INJ NEW DRUG ADDON: CPT

## 2024-07-18 PROCEDURE — 93010 ELECTROCARDIOGRAM REPORT: CPT | Performed by: INTERNAL MEDICINE

## 2024-07-18 PROCEDURE — 027136Z DILATION OF CORONARY ARTERY, TWO ARTERIES WITH THREE DRUG-ELUTING INTRALUMINAL DEVICES, PERCUTANEOUS APPROACH: ICD-10-PCS | Performed by: INTERNAL MEDICINE

## 2024-07-18 PROCEDURE — 99153 MOD SED SAME PHYS/QHP EA: CPT | Performed by: INTERNAL MEDICINE

## 2024-07-18 PROCEDURE — 82962 GLUCOSE BLOOD TEST: CPT

## 2024-07-18 PROCEDURE — B241ZZ3 ULTRASONOGRAPHY OF MULTIPLE CORONARY ARTERIES, INTRAVASCULAR: ICD-10-PCS | Performed by: INTERNAL MEDICINE

## 2024-07-18 PROCEDURE — 99152 MOD SED SAME PHYS/QHP 5/>YRS: CPT | Performed by: INTERNAL MEDICINE

## 2024-07-18 PROCEDURE — 92979 ENDOLUMINL IVUS OCT C EA: CPT | Performed by: INTERNAL MEDICINE

## 2024-07-18 PROCEDURE — 92978 ENDOLUMINL IVUS OCT C 1ST: CPT | Performed by: INTERNAL MEDICINE

## 2024-07-18 PROCEDURE — 85347 COAGULATION TIME ACTIVATED: CPT

## 2024-07-18 RX ORDER — HEPARIN SODIUM 5000 [USP'U]/ML
INJECTION, SOLUTION INTRAVENOUS; SUBCUTANEOUS
Status: DISCONTINUED
Start: 2024-07-18 | End: 2024-07-18 | Stop reason: WASHOUT

## 2024-07-18 RX ORDER — CLOPIDOGREL BISULFATE 75 MG/1
TABLET ORAL
Status: COMPLETED
Start: 2024-07-18 | End: 2024-07-18

## 2024-07-18 RX ORDER — HEPARIN SODIUM 5000 [USP'U]/ML
INJECTION, SOLUTION INTRAVENOUS; SUBCUTANEOUS
Status: COMPLETED
Start: 2024-07-18 | End: 2024-07-18

## 2024-07-18 RX ORDER — METOPROLOL SUCCINATE 50 MG/1
50 TABLET, EXTENDED RELEASE ORAL
Status: DISCONTINUED | OUTPATIENT
Start: 2024-07-19 | End: 2024-07-19

## 2024-07-18 RX ORDER — SODIUM CHLORIDE 9 MG/ML
INJECTION, SOLUTION INTRAVENOUS CONTINUOUS
Status: ACTIVE | OUTPATIENT
Start: 2024-07-18 | End: 2024-07-18

## 2024-07-18 RX ORDER — ASPIRIN 81 MG/1
81 TABLET ORAL DAILY
Status: DISCONTINUED | OUTPATIENT
Start: 2024-07-19 | End: 2024-07-19

## 2024-07-18 RX ORDER — ROSUVASTATIN CALCIUM 20 MG/1
20 TABLET, COATED ORAL NIGHTLY
Status: DISCONTINUED | OUTPATIENT
Start: 2024-07-18 | End: 2024-07-19

## 2024-07-18 RX ORDER — ONDANSETRON 2 MG/ML
4 INJECTION INTRAMUSCULAR; INTRAVENOUS EVERY 6 HOURS PRN
Status: DISCONTINUED | OUTPATIENT
Start: 2024-07-18 | End: 2024-07-19

## 2024-07-18 RX ORDER — VERAPAMIL HYDROCHLORIDE 2.5 MG/ML
INJECTION, SOLUTION INTRAVENOUS
Status: COMPLETED
Start: 2024-07-18 | End: 2024-07-18

## 2024-07-18 RX ORDER — ASPIRIN 81 MG/1
TABLET, CHEWABLE ORAL
Status: COMPLETED
Start: 2024-07-18 | End: 2024-07-18

## 2024-07-18 RX ORDER — SODIUM CHLORIDE 9 MG/ML
INJECTION, SOLUTION INTRAVENOUS
Status: DISCONTINUED | OUTPATIENT
Start: 2024-07-19 | End: 2024-07-18 | Stop reason: HOSPADM

## 2024-07-18 RX ORDER — IODIXANOL 320 MG/ML
100 INJECTION, SOLUTION INTRAVASCULAR
Status: COMPLETED | OUTPATIENT
Start: 2024-07-18 | End: 2024-07-18

## 2024-07-18 RX ORDER — LIDOCAINE HYDROCHLORIDE 10 MG/ML
INJECTION, SOLUTION EPIDURAL; INFILTRATION; INTRACAUDAL; PERINEURAL
Status: COMPLETED
Start: 2024-07-18 | End: 2024-07-18

## 2024-07-18 RX ORDER — CLOPIDOGREL BISULFATE 75 MG/1
75 TABLET ORAL DAILY
Status: DISCONTINUED | OUTPATIENT
Start: 2024-07-19 | End: 2024-07-19

## 2024-07-18 RX ORDER — MIDAZOLAM HYDROCHLORIDE 1 MG/ML
INJECTION INTRAMUSCULAR; INTRAVENOUS
Status: COMPLETED
Start: 2024-07-18 | End: 2024-07-18

## 2024-07-18 RX ADMIN — CLOPIDOGREL BISULFATE 75 MG: 75 TABLET ORAL at 11:07:00

## 2024-07-18 RX ADMIN — SODIUM CHLORIDE: 9 INJECTION, SOLUTION INTRAVENOUS at 18:26:00

## 2024-07-18 RX ADMIN — ROSUVASTATIN CALCIUM 20 MG: 20 TABLET, COATED ORAL at 22:44:00

## 2024-07-18 RX ADMIN — IODIXANOL 125 ML: 320 INJECTION, SOLUTION INTRAVASCULAR at 15:57:00

## 2024-07-18 RX ADMIN — ASPIRIN 81 MG: 81 TABLET, CHEWABLE ORAL at 11:06:00

## 2024-07-18 RX ADMIN — ONDANSETRON 4 MG: 2 INJECTION INTRAMUSCULAR; INTRAVENOUS at 21:41:00

## 2024-07-18 NOTE — PLAN OF CARE
Assumed care of patient at 1700 from cath lab. Admission navigator and med rec completed. Pt A/Ox 4. O2 sats maintained on room air. NSR with frequent PVC's on tele. Continent. Pt reports no pain. Pt to remain bedrest per orders until 2100. Pt and friend present updated on plan of care. Care needs met. Bed in lowest position, Call light within reach. Bed alarm on. IVF infusing per orders. Right and left groin incisions are clean/dry, soft with no hematoma present. 1+ pedal pulse.     POC: monitor overnight     Problem: Patient/Family Goals  Goal: Patient/Family Long Term Goal  Description: Patient's Long Term Goal: discharge    Interventions:  - appropriate consult, monitor overnight   - See additional Care Plan goals for specific interventions  Outcome: Completed  Goal: Patient/Family Short Term Goal  Description: Patient's Short Term Goal: feel better    Interventions:   - angiogram   - See additional Care Plan goals for specific interventions  Outcome: Completed

## 2024-07-18 NOTE — PROGRESS NOTES
Pt back from lab. Denies pain. See vitals flowsheet. EKG obtained. Awaiting bed.  4372 report given to Laura MCCULLOUGH.pt to floor via bed

## 2024-07-18 NOTE — PROCEDURES
Elective PCI  OM,  RCA via RFA / LFA  Cx MARINA x 1 with IVUS; 100>0; 0>3  RCA MARINA x 2 with IVUS; 100>0; 0>3  Perclose RFA  Perclose LFA    Monitor overnight

## 2024-07-18 NOTE — DISCHARGE INSTRUCTIONS
HOME CARE INSTRUCTIONS FOLLOWING CORONARY ANGIOGRAPHY, ANGIOPLASTY (PTCA/PTA) OR INSERTION OF STENT IN THE CORONARY ARTERIES     Activity:    DO NOT drive after the procedure. You may resume driving late the following day according to the nurse or physician’s instructions    Plan on resting and relaxing tonight and tomorrow    Resume your normal activity after 48 hours, or as instructed by your physician    Do not lift anything over 10 pounds for the next 24 hours    Avoid sexual activity for the next 24 hours    Avoid drinking alcohol for the next 24 hours    If the groin site was used, avoid repeated stair use and excessive walking for the next 24 hours     What is Normal?    A small lump at the procedure site associated with mild tenderness when touched    The procedure site may be bruised or discolored    There may be a small amount of drainage on the bandage   Special Instructions:    Drink plenty of fluids during the next 24 hours to “flush” the contrast from your system    The bandage is to remain in place for 24 hours    Keep the bandage clean and dry    DO NOT submerge the procedure site for 72 hours (no bath tubs or pools)     After 24 hours, you must remove the bandage    You should shower after removing the bandage, and wash the procedure site gently with soap and water    If you choose to wear a bandage for a few days, make sure it remains clean and dry and that it is changed daily   When you should NOTIFY YOUR PHYSICIAN:    Bleeding can occur at the procedure site - both on the outside of the skin and/or beneath the surface of the skin    Swelling or a large lump at the procedure site can occur, which may be accompanied by moderate to severe pain   If either of the above occurs, lie down flat. Have someone apply pressure to the procedure site with both hands, as instructed by the nurse. Hold pressure for 20 minutes and the bleeding should stop. Notify your physician of the occurrence   If the  bleeding does not stop, call 911 and continue to apply pressure    If you experience signs of a fever, temperature >101o, chills, infection (redness, swelling, thick yellow drainage, or a foul odor from the procedure site)    If you notice any numbness, tingling, or loss of feeling to your leg or foot for groin access    If you notice any numbness, tingling, or loss of feeling to your fingers or hand, if wrist access was utilized    ur medications was provided to you at discharge.    Continue the walking program initiated in the hospital and progress your walking as directed. Or, gradually resume your previous aerobic exercise schedule as tolerated.    Please call your physician’s office for a follow-up appointment. You should be seen in 2 weeks.    Follow up with Linda Shultz on 8/1 at 1:30 pm    Don't make any important business or personal decisions or sign legal documents during the next 24 hours

## 2024-07-19 VITALS
RESPIRATION RATE: 17 BRPM | DIASTOLIC BLOOD PRESSURE: 76 MMHG | HEIGHT: 71 IN | OXYGEN SATURATION: 99 % | HEART RATE: 71 BPM | BODY MASS INDEX: 32.48 KG/M2 | TEMPERATURE: 98 F | WEIGHT: 232 LBS | SYSTOLIC BLOOD PRESSURE: 99 MMHG

## 2024-07-19 LAB
ANION GAP SERPL CALC-SCNC: 6 MMOL/L (ref 0–18)
ATRIAL RATE: 67 BPM
BUN BLD-MCNC: 20 MG/DL (ref 9–23)
CALCIUM BLD-MCNC: 9.1 MG/DL (ref 8.7–10.4)
CHLORIDE SERPL-SCNC: 106 MMOL/L (ref 98–112)
CO2 SERPL-SCNC: 26 MMOL/L (ref 21–32)
CREAT BLD-MCNC: 0.68 MG/DL
EGFRCR SERPLBLD CKD-EPI 2021: 103 ML/MIN/1.73M2 (ref 60–?)
ERYTHROCYTE [DISTWIDTH] IN BLOOD BY AUTOMATED COUNT: 13.2 %
GLUCOSE BLD-MCNC: 119 MG/DL (ref 70–99)
HCT VFR BLD AUTO: 42 %
HGB BLD-MCNC: 14.3 G/DL
MCH RBC QN AUTO: 29.4 PG (ref 26–34)
MCHC RBC AUTO-ENTMCNC: 34 G/DL (ref 31–37)
MCV RBC AUTO: 86.2 FL
OSMOLALITY SERPL CALC.SUM OF ELEC: 290 MOSM/KG (ref 275–295)
P AXIS: 60 DEGREES
P-R INTERVAL: 188 MS
PLATELET # BLD AUTO: 251 10(3)UL (ref 150–450)
POTASSIUM SERPL-SCNC: 4.1 MMOL/L (ref 3.5–5.1)
Q-T INTERVAL: 430 MS
QRS DURATION: 100 MS
QTC CALCULATION (BEZET): 454 MS
R AXIS: -18 DEGREES
RBC # BLD AUTO: 4.87 X10(6)UL
SODIUM SERPL-SCNC: 138 MMOL/L (ref 136–145)
T AXIS: -35 DEGREES
VENTRICULAR RATE: 67 BPM
WBC # BLD AUTO: 8.7 X10(3) UL (ref 4–11)

## 2024-07-19 PROCEDURE — 85027 COMPLETE CBC AUTOMATED: CPT | Performed by: INTERNAL MEDICINE

## 2024-07-19 PROCEDURE — 80048 BASIC METABOLIC PNL TOTAL CA: CPT | Performed by: INTERNAL MEDICINE

## 2024-07-19 RX ADMIN — METOPROLOL SUCCINATE 50 MG: 50 TABLET, EXTENDED RELEASE ORAL at 06:51:00

## 2024-07-19 RX ADMIN — CLOPIDOGREL BISULFATE 75 MG: 75 TABLET ORAL at 08:24:00

## 2024-07-19 RX ADMIN — ASPIRIN 81 MG: 81 TABLET ORAL at 08:24:00

## 2024-07-19 NOTE — CARDIAC REHAB
Cardiac rehab education completed with patient  Stent card given  Patient referred to cardiac rehab  Patient to call for appt

## 2024-07-19 NOTE — PROGRESS NOTES
Patient was readmitted into [facility] yesterday.  Encounter closing.

## 2024-07-19 NOTE — PROCEDURES
[facility]    PATIENT'S NAME: DAY CONLEY   ATTENDING PHYSICIAN: Milse Johnson M.D.   OPERATING PHYSICIAN: Octavio Sargent MD   PATIENT ACCOUNT#:   [id number]    LOCATION:  [address] [place] [address]  MEDICAL RECORD #:   [id number]       YOB: 1960  ADMISSION DATE:       07/18/2024      OPERATION DATE:  07/19/2024    CARDIAC PROCEDURE TRANSCRIPTION      PERCUTANEOUS CORONARY INTERVENTION     PREOPERATIVE DIAGNOSIS:    1.   Coronary disease.  2.   Non-ST-elevation myocardial infarction.  POSTOPERATIVE DIAGNOSIS:    1.   Coronary disease.  2.   Non-ST-elevation myocardial infarction.  PROCEDURE PERFORMED:    1.   Percutaneous transluminal coronary angioplasty with drug-eluting stent x1 of the chronically totally occluded obtuse marginal.  2.   Intravascular ultrasound of the obtuse marginal.  3.   Percutaneous transluminal coronary angioplasty with drug-eluting stent x2 of the chronically totally occluded right coronary artery.  4.   Intravascular ultrasound if the right coronary artery.    PROCEDURAL DETAILS:  After obtaining informed consent, we obtained access in the right femoral artery using a micropuncture technique.  We then obtained access in the left femoral artery using a micropuncture technique.  A JR4 catheter was advanced over a wire and used to engage the saphenous vein graft.  This was wired with a Corsair and a  200.  Guide extension was advanced into the saphenous vein graft.  We then used the  to get retrograde into the second obtuse marginal.  Corsair catheter was advanced using a combination of Gladius, , and Mongo wires to get up to the proximal cap.  We then came antegrade with a Corsair.  We used a Hornet 0.014 to penetrate the proximal cap.  Corsair catheter was advanced.  We used a combination of Mongo and Fielder XT to knuckle down.  Corsair catheter was removed.  Guide extension was advanced.  We used a 2.5 balloon and connected with a Gladius retrograde with a  reverse CART.  The wire was externalized.  After the wire was externalized, we brought a Sasuke antegrade.  The second obtuse marginal was wired with a Kaiden blue.  Sasuke was removed.  Corsair catheter was advanced.  Externalized wire was removed.  We predilated with a 2.0 balloon, followed by intravascular ultrasound.  After the intravascular ultrasound, the artery was stented with a 2.5 x 48 Synergy drug-eluting stent.  This was postdilated with a 3.0 and 3.5 noncompliant balloons.  Final angiography showed no residual stenosis and JOE 3 flow.  Corsair catheter was then brought retrograde to the distal cap of the right coronary artery.  We used a Gladius wire to penetrate the distal cap, Fielder XT and Mongo wires to knuckle up to the proximal cap.  Corsair catheter was brought antegrade.  Hornet 0.014 wire was used to penetrate the proximal cap.  Corsair catheter was advanced.  Hornet wire was removed and exchanged for a Fielder XT.  Fielder XT was used to knuckle down.  Corsair catheter was advanced.  Corsair catheter was then trapped out.  We brought a 2.5 balloon antegrade with the guide extension.  Wire retrograde was exchanged for a Gladius.  We performed reverse CART in the mid right coronary artery.  Wire was externalized with a R350.  Following this, we predilated the right coronary artery with a 2.5 balloon.  We then did intravascular ultrasound.  After intravascular ultrasound, the distal vessel was dilated with a 3.5 noncompliant balloon.  We then stented the right coronary artery with a 4.0 x 48 drug-eluting stent x2 and postdilated with a 4.5 noncompliant balloon.  Final angiography showed no residual stenosis and JOE 3 flow.  Both guides were removed over a wire.  Perclose device was deployed in the right femoral artery for hemostasis.  Perclose device was deployed in the left femoral artery for hemostasis.  The patient was transferred to Recovery in stable condition.      The patient received  Plavix 600 mg in cardiac catheterization lab.    COMPLICATIONS:  None.    ESTIMATED BLOOD LOSS:  20 mL.    CONCLUSIONS:    1.   Successful angioplasty of chronically totally occluded obtuse marginal with reduction of stenosis from 100% to 0% and improvement of flow from JOE 0 to JOE 3 with IVUS guidance.  2.   Successful angioplasty of chronically totally occluded right coronary artery with reduction of stenosis from 100% to 0% and improvement of flow from JOE 0 to JOE 3 with IVUS guidance.    Dictated By Octavio Sargent MD  d: 07/18/2024 16:10:27  t: 07/19/2024 13:57:51  [facility] [id number]  TD/

## 2024-07-19 NOTE — PROGRESS NOTES
[facility]  Progress Note    Doreen Steiner Patient Status:  Observation    1960 MRN [id number]   Location [facility] 8NE-A Attending Miles Johnson MD   Hosp Day # 0 PCP Avis Weeks MD       Assessment:    CAD s/p PCI  OM and  RCA  HTN  Hyperlipidemia    Plan:     Resume Eliquis  Cont clopidogrel with no ASA  Home today  Follow up with Dr. Ramos    Subjective:  No chest pain or shortness of breath.    Objective:  BP (!) 101/91 (BP Location: Left arm)   Pulse 80   Temp 98.1 °F (36.7 °C) (Oral)   Resp 15   Ht 5' 11\" (1.803 m)   Wt 232 lb (105.2 kg)   SpO2 96%   BMI 32.36 kg/m²     Temp (24hrs), Av.1 °F (36.7 °C), Min:98 °F (36.7 °C), Max:98.1 °F (36.7 °C)      Tele  SR    Intake/Output:    Intake/Output Summary (Last 24 hours) at 2024 0651  Last data filed at 2024  Gross per 24 hour   Intake --   Output 400 ml   Net -400 ml       Last 3 Weights   07/15/24 1448 232 lb (105.2 kg)   24 0310 227 lb 8.2 oz (103.2 kg)   24 0240 227 lb 8.2 oz (103.2 kg)   24 0135 227 lb 8.2 oz (103.2 kg)   24 2247 232 lb (105.2 kg)   24 1723 232 lb 4.8 oz (105.4 kg)               Physical Exam:    Gen: alert, oriented x 3, NAD  Heent/neck: no jvd  Cardiac: regular rate and rhythm, normal S1,S2  Lungs: CTA  Abd: soft, NT/ND +bs  Ext: no edema; RFA access site soft without hematoma; LFA access site soft without hematoma      Labs:       Medications:     clopidogrel  75 mg Oral Daily    aspirin  81 mg Oral Daily    metoprolol succinate ER  50 mg Oral Daily Beta Blocker    rosuvastatin  20 mg Oral Nightly    apixaban  5 mg Oral BID           Octavio Sargent MD  2024  6:51 AM

## 2024-07-19 NOTE — DIETARY NOTE
Clinical Nutrition     Dietitian consult received per cardiac rehab standing order. Pt to be educated by cardiac rehab staff and encouraged to attend outpatient classes taught by [facility]. [facility] available PRN.    Jannie Champion, YNES, LDN, [facility]  Clinical Dietitian  Spectra: [phone]

## 2024-07-19 NOTE — DISCHARGE SUMMARY
[facility]   part of [facility]     Discharge Summary    Doreen Steiner Patient Status:  Observation    1960 MRN [id number]   Location [facility] 8NE-A Attending Miles Johnson MD   Hosp Day # 0 PCP Avis Weeks MD         Admit date: 2024    Discharge date and time: 2024    Admitting Physician: Miles Johnson MD     Admission Diagnoses: Chronic total occlusion of coronary artery [I25.82]    Discharge Diagnoses:CAD s/p PCI  Om and  RCA      HPI: 64yr old male patient presented for elective PCI  OM,  RCA via RFA/LFA    Consultations: none    Procedures: PCI  OM,  RCA via RFA/LFA    Hospital Course: pt stable s/p PCI.     Follow-up with Dr Joyner as directed.    Discharge Medications:   Current Discharge Medication List        CONTINUE these medications which have NOT CHANGED    Details   clopidogrel 75 MG Oral Tab Take 1 tablet (75 mg total) by mouth daily.  Qty: 30 tablet, Refills: 3      metoprolol succinate ER 50 MG Oral Tablet 24 Hr Take 1 tablet (50 mg total) by mouth Daily Beta Blocker.  Qty: 30 tablet, Refills: 3      apixaban 5 MG Oral Tab Take 1 tablet (5 mg total) by mouth 2 (two) times daily.  Qty: 60 tablet, Refills: 0      Semaglutide (RYBELSUS) 14 MG Oral Tab Take 14 mg by mouth daily.  Qty: 30 tablet, Refills: 2      rosuvastatin 20 MG Oral Tab Take 1 tablet (20 mg total) by mouth nightly.                Medication List        CONTINUE taking these medications      apixaban 5 MG Tabs  Commonly known as: Eliquis  Take 1 tablet (5 mg total) by mouth 2 (two) times daily.     clopidogrel 75 MG Tabs  Commonly known as: Plavix  Take 1 tablet (75 mg total) by mouth daily.     metoprolol succinate ER 50 MG Tb24  Commonly known as: Toprol XL  Take 1 tablet (50 mg total) by mouth Daily Beta Blocker.     rosuvastatin 20 MG Tabs  Commonly known as: Crestor     Rybelsus 14 MG Tabs  Generic drug: Semaglutide  Take 14 mg by mouth daily.               Signed:  Vladimir Greene, APRN  7/19/2024

## 2024-07-19 NOTE — PLAN OF CARE
NURSING DISCHARGE NOTE    Discharged Home via Wheelchair.  Accompanied by Family member  Belongings Taken by patient/family.  Patient discharged home today.All discharge instructions reviewed with the patient.All follow up appointment reviewed.Patient verbalized understanding

## 2024-07-19 NOTE — PLAN OF CARE
Assumed care for pt at 19:30 on 7/18    A&Ox4. Denies pain. VSS on RA. S/p PCI via R+L groin, soft dressing c/d/I. Bilateral pedal pulses faint, verified with doppler. NSR with frequent PVCs on tele. LARRY STINSON paged, confirmed pt could take eliquis tonight, given. Continent of B&B. Up with standby assist.     Plan: AM labs, monitor groin, potential dc     Call light in reach, able to make needs known.

## 2024-07-22 ENCOUNTER — PATIENT OUTREACH (OUTPATIENT)
Dept: CASE MANAGEMENT | Age: 64
End: 2024-07-22

## 2024-07-22 NOTE — PROGRESS NOTES
Attempted to contact pt for TCM however no answer. Call continued to ring and did not go to a VM. Unable to leave a VM at this time. NCM to try again at a later time.

## 2024-08-27 ENCOUNTER — MED REC SCAN ONLY (OUTPATIENT)
Dept: FAMILY MEDICINE CLINIC | Facility: CLINIC | Age: 64
End: 2024-08-27

## 2024-09-05 ENCOUNTER — APPOINTMENT (OUTPATIENT)
Dept: CARDIAC REHAB | Facility: HOSPITAL | Age: 64
End: 2024-09-05
Attending: INTERNAL MEDICINE
Payer: COMMERCIAL

## 2024-09-29 ENCOUNTER — LAB ENCOUNTER (OUTPATIENT)
Dept: LAB | Age: 64
End: 2024-09-29
Attending: FAMILY MEDICINE
Payer: COMMERCIAL

## 2024-09-29 DIAGNOSIS — E78.00 HYPERCHOLESTEROLEMIA: ICD-10-CM

## 2024-09-29 DIAGNOSIS — E11.9 TYPE 2 DIABETES MELLITUS WITHOUT COMPLICATION, WITHOUT LONG-TERM CURRENT USE OF INSULIN (HCC): ICD-10-CM

## 2024-09-29 DIAGNOSIS — Z12.5 SCREENING PSA (PROSTATE SPECIFIC ANTIGEN): ICD-10-CM

## 2024-09-29 DIAGNOSIS — I25.10 CORONARY ARTERY DISEASE, UNSPECIFIED VESSEL OR LESION TYPE, UNSPECIFIED WHETHER ANGINA PRESENT, UNSPECIFIED WHETHER NATIVE OR TRANSPLANTED HEART: ICD-10-CM

## 2024-09-29 LAB
ALBUMIN SERPL-MCNC: 4.4 G/DL (ref 3.2–4.8)
ALBUMIN/GLOB SERPL: 1.6 {RATIO} (ref 1–2)
ALP LIVER SERPL-CCNC: 82 U/L
ALT SERPL-CCNC: 36 U/L
ANION GAP SERPL CALC-SCNC: 6 MMOL/L (ref 0–18)
AST SERPL-CCNC: 33 U/L (ref ?–34)
BILIRUB SERPL-MCNC: 0.8 MG/DL (ref 0.2–1.1)
BUN BLD-MCNC: 16 MG/DL (ref 9–23)
BUN/CREAT SERPL: 11 (ref 10–20)
CALCIUM BLD-MCNC: 9.5 MG/DL (ref 8.7–10.4)
CHLORIDE SERPL-SCNC: 110 MMOL/L (ref 98–112)
CHOLEST SERPL-MCNC: 139 MG/DL (ref ?–200)
CO2 SERPL-SCNC: 23 MMOL/L (ref 21–32)
COMPLEXED PSA SERPL-MCNC: 3.8 NG/ML (ref ?–4)
CREAT BLD-MCNC: 1.46 MG/DL
CREAT UR-SCNC: 192.2 MG/DL
EGFRCR SERPLBLD CKD-EPI 2021: 53 ML/MIN/1.73M2 (ref 60–?)
EST. AVERAGE GLUCOSE BLD GHB EST-MCNC: 128 MG/DL (ref 68–126)
FASTING PATIENT LIPID ANSWER: YES
FASTING STATUS PATIENT QL REPORTED: YES
GLOBULIN PLAS-MCNC: 2.7 G/DL (ref 2–3.5)
GLUCOSE BLD-MCNC: 130 MG/DL (ref 70–99)
HBA1C MFR BLD: 6.1 % (ref ?–5.7)
HDLC SERPL-MCNC: 39 MG/DL (ref 40–59)
LDLC SERPL CALC-MCNC: 81 MG/DL (ref ?–100)
MICROALBUMIN UR-MCNC: 4 MG/DL
MICROALBUMIN/CREAT 24H UR-RTO: 20.8 UG/MG (ref ?–30)
NONHDLC SERPL-MCNC: 100 MG/DL (ref ?–130)
OSMOLALITY SERPL CALC.SUM OF ELEC: 291 MOSM/KG (ref 275–295)
POTASSIUM SERPL-SCNC: 4.5 MMOL/L (ref 3.5–5.1)
PROT SERPL-MCNC: 7.1 G/DL (ref 5.7–8.2)
SODIUM SERPL-SCNC: 139 MMOL/L (ref 136–145)
TRIGL SERPL-MCNC: 102 MG/DL (ref 30–149)
VLDLC SERPL CALC-MCNC: 16 MG/DL (ref 0–30)

## 2024-09-29 PROCEDURE — 80061 LIPID PANEL: CPT

## 2024-09-29 PROCEDURE — 80053 COMPREHEN METABOLIC PANEL: CPT

## 2024-09-29 PROCEDURE — 83036 HEMOGLOBIN GLYCOSYLATED A1C: CPT

## 2024-09-29 PROCEDURE — 36415 COLL VENOUS BLD VENIPUNCTURE: CPT

## 2024-09-29 PROCEDURE — 82570 ASSAY OF URINE CREATININE: CPT

## 2024-09-29 PROCEDURE — 82043 UR ALBUMIN QUANTITATIVE: CPT

## 2024-10-07 ENCOUNTER — OFFICE VISIT (OUTPATIENT)
Dept: FAMILY MEDICINE CLINIC | Facility: CLINIC | Age: 64
End: 2024-10-07
Payer: COMMERCIAL

## 2024-10-07 VITALS
RESPIRATION RATE: 16 BRPM | BODY MASS INDEX: 31.5 KG/M2 | TEMPERATURE: 97 F | SYSTOLIC BLOOD PRESSURE: 137 MMHG | HEIGHT: 71 IN | WEIGHT: 225 LBS | OXYGEN SATURATION: 97 % | DIASTOLIC BLOOD PRESSURE: 63 MMHG | HEART RATE: 94 BPM

## 2024-10-07 DIAGNOSIS — E78.00 HYPERCHOLESTEROLEMIA: ICD-10-CM

## 2024-10-07 DIAGNOSIS — I25.810 CORONARY ARTERY DISEASE INVOLVING CORONARY BYPASS GRAFT OF NATIVE HEART WITHOUT ANGINA PECTORIS: ICD-10-CM

## 2024-10-07 DIAGNOSIS — E11.9 TYPE 2 DIABETES MELLITUS WITHOUT COMPLICATION, WITHOUT LONG-TERM CURRENT USE OF INSULIN (HCC): Primary | ICD-10-CM

## 2024-10-07 DIAGNOSIS — R97.20 ELEVATED PSA: ICD-10-CM

## 2024-10-07 DIAGNOSIS — E55.9 VITAMIN D DEFICIENCY: ICD-10-CM

## 2024-10-07 DIAGNOSIS — Z95.1 S/P CABG (CORONARY ARTERY BYPASS GRAFT): ICD-10-CM

## 2024-10-07 RX ORDER — ORAL SEMAGLUTIDE 14 MG/1
1 TABLET ORAL DAILY
Qty: 30 TABLET | Refills: 4 | Status: SHIPPED | OUTPATIENT
Start: 2024-10-07 | End: 2024-11-06

## 2024-10-07 RX ORDER — ORAL SEMAGLUTIDE 14 MG/1
1 TABLET ORAL DAILY
COMMUNITY
Start: 2024-09-24

## 2024-10-07 NOTE — PATIENT INSTRUCTIONS
Continue current meds.   Watch diet for fats and carbs.   Stay active.    Do fasting labs in February prior next visit.   Refill policies:      Allow 3 business days for refills; controlled substances may take longer.  Contact your pharmacy at least 5-7 business days prior to running out of medication and have them send an electronic request or submit through the \"request refill\" option thru your [employer] account. No need to do both, as multiple requests will create an automated [employer] message to notify of a denial for one of the duplicated requests, causing you undue confusion.   Refills are NOT addressed on weekends; covering physicians do not authorize routine medications on weekends.  No narcotics or controlled substances are refilled after noon on Fridays or by on call physicians.  By law, narcotics cannot be faxed or phoned into your pharmacy.  If your prescription is due for a refill, you may be due for a follow up appointment. Please call our office at [phone] to make an appointment or schedule an appointment via [employer].  To best provide you care, patients receiving routine medications need to be seen at least twice a year. Patients receiving narcotic/controlled substance medications need to be seen at least once every 3 months.  In the event that your preferred pharmacy does not have the requested medication in stock (ie Backordered), it is your responsibility to find another pharmacy that has the requested medication available. We will gladly send a new prescription to that pharmacy at your request.  controlled substances may not be able to be filled out of state due to license restrictions.  If you have a planned trip, it's best to call your pharmacy at least 5-7 business days to prevent any delays in your medication refill.    Scheduling Tests:    If your physician has ordered radiology tests such as MRI or CT scans, please contact Central Scheduling at [phone] right away to schedule the test.   Once scheduled, the [facility] Centralized Referral Team will work with your insurance carrier to obtain pre-certification or prior authorization.  Depending on your insurance carrier, approval may take 3-10 days.  It is highly recommended patients assure they have received an authorization before having a test performed.  If test is done without insurance authorization, patient may be responsible for the entire amount billed.      Precertification and Prior Authorizations:  If your physician has recommended that you have a procedure or additional testing performed the [facility] Centralized Referral Team will contact your insurance carrier to obtain pre-certification or prior authorization.    You are strongly encouraged to contact your insurance carrier to verify that your procedure/test has been approved and is a COVERED benefit.  Although the [facility] Centralized Referral Team does its due diligence, the insurance carrier gives the disclaimer that \"Although the procedure is authorized, this does not guarantee payment.\"    Ultimately the patient is responsible for payment.   Thank you for your understanding in this matter.  Paperwork Completion:  If you require FMLA or disability paperwork for your recovery, please make sure to either drop it off or have it faxed to our office at [phone]. Be sure the form has your name and date of birth on it.  The form will be faxed to our Forms Department and they will complete it for you.  There is a 25$ fee for all forms that need to be filled out.  Please be aware there is a 10-14 day turnaround time.  You will need to sign a release of information (MEL) form if your paperwork does not come with one.  You may call the Forms Department with any questions at [phone].  Their fax number is [phone].

## 2024-10-08 NOTE — PROGRESS NOTES
Doreen Steiner is a 64 year old male.  Diabetes, stress, hypercholesterolemia, coronary artery disease, elevated PSA  HPI:   Patient is coming for follow-up of diabetes hemoglobin A1c came back at 6.1.  Patient is taking Rybelsus 14 mg daily thyroid medication well lost some weight.   Patient understands risk of using this medication including pancreatitis and medullary thyroid carcinoma and others.  He is due for eye examination denies any numbness or tingling of the feet.  Recheck blood work in 3 -4 months prior next visit.     Hypercholesterolemia patient is taking rosuvastatin prescribed by cardiologist doing well cholesterol numbers are stable.  Monitor.    Coronary artery disease status post CABG, recently patient had angiogram done with 3 stents.  Had chest pain.  He is seeing cardiologist regularly.  Seeing Dr. Gilbert zamudio  Doing well asymptomatic no chest pain no shortness of breath no palpitations.    Elevated PSA patient saw urologist, had MRI done . The patient needs to follow-up with urologist needs referral to see them.     Vitamin D deficiency monitor blood work check blood work before next visit.  Continue supplementation over-the-counter.  Current Outpatient Medications   Medication Sig Dispense Refill    RYBELSUS 14 MG Oral Tab Take 1 tablet by mouth daily.      Semaglutide (RYBELSUS) 14 MG Oral Tab Take 1 tablet by mouth daily. 30 tablet 4    clopidogrel 75 MG Oral Tab Take 1 tablet (75 mg total) by mouth daily. 30 tablet 3    apixaban 5 MG Oral Tab Take 1 tablet (5 mg total) by mouth 2 (two) times daily. 60 tablet 0    rosuvastatin 20 MG Oral Tab Take 1 tablet (20 mg total) by mouth nightly.      metoprolol succinate ER 50 MG Oral Tablet 24 Hr Take 1 tablet (50 mg total) by mouth Daily Beta Blocker. 30 tablet 3      Past Medical History:    Acute, but ill-defined, cerebrovascular disease    Back pain    CAD (coronary artery disease)    2004    Essential hypertension    Heart attack (HCC)     High blood pressure    High cholesterol    Hyperlipidemia    Prediabetes    Stress    Weight gain      Social History:  Social History     Socioeconomic History    Marital status:    Tobacco Use    Smoking status: Never    Smokeless tobacco: Never   Vaping Use    Vaping status: Never Used   Substance and Sexual Activity    Alcohol use: No    Drug use: No     Social Determinants of Health     Food Insecurity: No Food Insecurity (7/18/2024)    Food Insecurity     Food Insecurity: Never true   Transportation Needs: No Transportation Needs (7/18/2024)    Transportation Needs     Lack of Transportation: No   Physical Activity: Insufficiently Active (10/28/2020)    Received from [employer], [facility] [employer]    Exercise Vital Sign     Days of Exercise per Week: 3 days     Minutes of Exercise per Session: 40 min   Housing Stability: Low Risk  (7/18/2024)    Housing Stability     Housing Instability: No        REVIEW OF SYSTEMS:   GENERAL HEALTH: feels well otherwise  SKIN: denies any unusual skin lesions or rashes  HEENT no congestion no runny nose no sore throat  Neck no neck pain  RESPIRATORY: denies shortness of breath with exertion  CARDIOVASCULAR: denies chest pain on exertion  GI: denies abdominal pain and denies heartburn  NEURO: denies headaches  Psych normal mood.    EXAM:   /63 (BP Location: Left arm, Patient Position: Sitting, Cuff Size: adult)   Pulse 94   Temp 96.8 °F (36 °C) (Temporal)   Resp 16   Ht 5' 11\" (1.803 m)   Wt 225 lb (102.1 kg)   SpO2 97%   BMI 31.38 kg/m²   GENERAL: well developed, well nourished,in no apparent distress  SKIN: no rashes,no suspicious lesions  HEENT: atraumatic, normocephalic,ears and throat are clear  NECK: supple,no adenopathy,  LUNGS: clear to auscultation  CARDIO: RRR without murmur  GI: good BS's,no masses, HSM or tenderness  EXTREMITIES: no edema  Psychiatric - alert  and oriented x3, normal mood     Results for orders placed or performed  in visit on 09/29/24   PSA Screen [E]   Result Value Ref Range    Prostate Specific Antigen Screen  3.80 <=4.00 ng/mL   Comp Metabolic Panel (14)   Result Value Ref Range    Glucose 130 (H) 70 - 99 mg/dL    Sodium 139 136 - 145 mmol/L    Potassium 4.5 3.5 - 5.1 mmol/L    Chloride 110 98 - 112 mmol/L    CO2 23.0 21.0 - 32.0 mmol/L    Anion Gap 6 0 - 18 mmol/L    BUN 16 9 - 23 mg/dL    Creatinine 1.46 (H) 0.70 - 1.30 mg/dL    BUN/CREA Ratio 11.0 10.0 - 20.0    Calcium, Total 9.5 8.7 - 10.4 mg/dL    Calculated Osmolality 291 275 - 295 mOsm/kg    eGFR-Cr 53 (L) >=60 mL/min/1.73m2    ALT 36 10 - 49 U/L    AST 33 <34 U/L    Alkaline Phosphatase 82 45 - 117 U/L    Bilirubin, Total 0.8 0.2 - 1.1 mg/dL    Total Protein 7.1 5.7 - 8.2 g/dL    Albumin 4.4 3.2 - 4.8 g/dL    Globulin  2.7 2.0 - 3.5 g/dL    A/G Ratio 1.6 1.0 - 2.0    Patient Fasting for CMP? Yes    Lipid Panel   Result Value Ref Range    Cholesterol, Total 139 <200 mg/dL    HDL Cholesterol 39 (L) 40 - 59 mg/dL    Triglycerides 102 30 - 149 mg/dL    LDL Cholesterol 81 <100 mg/dL    VLDL 16 0 - 30 mg/dL    Non HDL Chol 100 <130 mg/dL    Patient Fasting for Lipid? Yes    Hemoglobin A1C   Result Value Ref Range    HgbA1C 6.1 (H) <5.7 %    Estimated Average Glucose 128 (H) 68 - 126 mg/dL   Microalb/Creat Ratio, Random Urine   Result Value Ref Range    Microalbumin, Urine 4.00 mg/dL    Creatinine Ur Random 192.20 mg/dL    Malb/Cre Calc 20.8 <=30.0 ug/mg     ASSESSMENT AND PLAN:     Encounter Diagnoses   Name Primary?    Type 2 diabetes mellitus without complication, without long-term current use of insulin (HCC) Yes    Hypercholesterolemia     Elevated PSA     Coronary artery disease involving coronary bypass graft of native heart without angina pectoris     S/P CABG (coronary artery bypass graft)     Vitamin D deficiency        Orders Placed This Encounter   Procedures    Comp Metabolic Panel (14)    Hemoglobin A1C    Microalb/Creat Ratio, Random Urine    Lipid Panel     Vitamin D       Meds & Refills for this Visit:  Requested Prescriptions     Signed Prescriptions Disp Refills    Semaglutide (RYBELSUS) 14 MG Oral Tab 30 tablet 4     Sig: Take 1 tablet by mouth daily.     Continue current meds.   Watch diet for fats and carbs.   Stay active.    Do fasting labs in February prior next visit.     Imaging & Consults:  OPHTHALMOLOGY - INTERNAL  UROLOGY - INTERNAL    The patient indicates understanding of these issues and agrees to the plan.  The patient is asked to return in  Feb 2025.  The note was dictated using speech recognition software.  Accuracy and grammar in transcription may be subject to error..

## 2024-10-30 ENCOUNTER — TELEPHONE (OUTPATIENT)
Dept: SURGERY | Facility: CLINIC | Age: 64
End: 2024-10-30

## 2024-10-30 NOTE — TELEPHONE ENCOUNTER
Patient contacted on rescheduling appointment for 10/30/24 visit due to Dr. Garcia called to an emergent surgery and unable to see patients. Contact number [phone] provided to patient to call in and reschedule appointment with provider. - spoke with patient appt rescheduled

## 2025-01-01 ENCOUNTER — APPOINTMENT (OUTPATIENT)
Dept: CV DIAGNOSTICS | Facility: HOSPITAL | Age: 65
End: 2025-01-01
Attending: INTERNAL MEDICINE
Payer: COMMERCIAL

## 2025-01-01 ENCOUNTER — APPOINTMENT (OUTPATIENT)
Dept: CT IMAGING | Facility: HOSPITAL | Age: 65
End: 2025-01-01
Attending: EMERGENCY MEDICINE
Payer: COMMERCIAL

## 2025-01-01 ENCOUNTER — APPOINTMENT (OUTPATIENT)
Dept: GENERAL RADIOLOGY | Facility: HOSPITAL | Age: 65
End: 2025-01-01
Attending: EMERGENCY MEDICINE
Payer: COMMERCIAL

## 2025-01-01 ENCOUNTER — HOSPITAL ENCOUNTER (INPATIENT)
Facility: HOSPITAL | Age: 65
LOS: 1 days | End: 2025-01-01
Attending: EMERGENCY MEDICINE | Admitting: HOSPITALIST
Payer: COMMERCIAL

## 2025-01-01 ENCOUNTER — APPOINTMENT (OUTPATIENT)
Dept: INTERVENTIONAL RADIOLOGY/VASCULAR | Facility: HOSPITAL | Age: 65
End: 2025-01-01
Attending: INTERNAL MEDICINE
Payer: COMMERCIAL

## 2025-01-01 ENCOUNTER — APPOINTMENT (OUTPATIENT)
Dept: GENERAL RADIOLOGY | Facility: HOSPITAL | Age: 65
End: 2025-01-01
Attending: INTERNAL MEDICINE
Payer: COMMERCIAL

## 2025-01-01 VITALS
RESPIRATION RATE: 24 BRPM | SYSTOLIC BLOOD PRESSURE: 47 MMHG | OXYGEN SATURATION: 100 % | HEART RATE: 60 BPM | WEIGHT: 218.25 LBS | TEMPERATURE: 96 F | BODY MASS INDEX: 30.56 KG/M2 | DIASTOLIC BLOOD PRESSURE: 26 MMHG | HEIGHT: 70.98 IN

## 2025-01-01 DIAGNOSIS — I46.9 CARDIAC ARREST (HCC): Primary | ICD-10-CM

## 2025-01-01 DIAGNOSIS — I21.29 ST ELEVATION MYOCARDIAL INFARCTION (STEMI) INVOLVING OTHER CORONARY ARTERY (HCC): ICD-10-CM

## 2025-01-01 DIAGNOSIS — S22.41XA CLOSED FRACTURE OF MULTIPLE RIBS OF RIGHT SIDE, INITIAL ENCOUNTER: ICD-10-CM

## 2025-01-01 DIAGNOSIS — S09.8XXA BLUNT HEAD TRAUMA, INITIAL ENCOUNTER: ICD-10-CM

## 2025-01-01 LAB
ALBUMIN SERPL-MCNC: 1.1 G/DL (ref 3.2–4.8)
ALBUMIN SERPL-MCNC: 2.2 G/DL (ref 3.2–4.8)
ALBUMIN SERPL-MCNC: 3.3 G/DL (ref 3.2–4.8)
ALBUMIN/GLOB SERPL: 1.1 {RATIO} (ref 1–2)
ALBUMIN/GLOB SERPL: 1.7 {RATIO} (ref 1–2)
ALP LIVER SERPL-CCNC: 103 U/L (ref 45–117)
ALP LIVER SERPL-CCNC: 65 U/L (ref 45–117)
ALP LIVER SERPL-CCNC: 89 U/L (ref 45–117)
ALT SERPL-CCNC: 395 U/L (ref 10–49)
ALT SERPL-CCNC: 522 U/L (ref 10–49)
ALT SERPL-CCNC: >3300 U/L (ref 10–49)
ANION GAP SERPL CALC-SCNC: 18 MMOL/L (ref 0–18)
ANTIBODY SCREEN: NEGATIVE
APTT PPP: 35 SECONDS (ref 23–36)
ARTERIAL PATENCY WRIST A: POSITIVE
AST SERPL-CCNC: 2561 U/L (ref ?–34)
AST SERPL-CCNC: 430 U/L (ref ?–34)
AST SERPL-CCNC: 478 U/L (ref ?–34)
ATRIAL RATE: 24 BPM
ATRIAL RATE: 93 BPM
BASE EXCESS BLDA CALC-SCNC: -11.2 MMOL/L (ref ?–2)
BASE EXCESS BLDA CALC-SCNC: -13.4 MMOL/L (ref ?–2)
BASE EXCESS BLDA CALC-SCNC: -16.8 MMOL/L (ref ?–2)
BASE EXCESS BLDA CALC-SCNC: -23.3 MMOL/L (ref ?–2)
BASE EXCESS BLDA CALC-SCNC: -23.6 MMOL/L (ref ?–2)
BASE EXCESS BLDA CALC-SCNC: -9.7 MMOL/L (ref ?–2)
BASE EXCESS BLDV CALC-SCNC: -23 MMOL/L
BASOPHILS # BLD AUTO: 0.05 X10(3) UL (ref 0–0.2)
BASOPHILS # BLD: 0 X10(3) UL (ref 0–0.2)
BASOPHILS # BLD: 0 X10(3) UL (ref 0–0.2)
BASOPHILS NFR BLD AUTO: 0.2 %
BASOPHILS NFR BLD: 0 %
BASOPHILS NFR BLD: 0 %
BILIRUB SERPL-MCNC: 0.5 MG/DL (ref 0.2–1.1)
BILIRUB SERPL-MCNC: 0.6 MG/DL (ref 0.2–1.1)
BILIRUB SERPL-MCNC: 0.7 MG/DL (ref 0.2–1.1)
BLOOD TYPE BARCODE: 6200
BODY TEMPERATURE: 98.6 F
BUN BLD-MCNC: 21 MG/DL (ref 9–23)
BUN BLD-MCNC: 27 MG/DL (ref 9–23)
BUN BLD-MCNC: 30 MG/DL (ref 9–23)
CA-I BLD-SCNC: 0.77 MMOL/L (ref 0.95–1.32)
CA-I BLD-SCNC: 0.86 MMOL/L (ref 0.95–1.32)
CA-I BLD-SCNC: 0.97 MMOL/L (ref 0.95–1.32)
CA-I BLD-SCNC: 0.98 MMOL/L (ref 0.95–1.32)
CA-I BLD-SCNC: 1.05 MMOL/L (ref 0.95–1.32)
CA-I BLD-SCNC: 1.07 MMOL/L (ref 0.95–1.32)
CALCIUM BLD-MCNC: 5.7 MG/DL (ref 8.7–10.6)
CALCIUM BLD-MCNC: 7.5 MG/DL (ref 8.7–10.6)
CALCIUM BLD-MCNC: 7.9 MG/DL (ref 8.7–10.6)
CHLORIDE SERPL-SCNC: 102 MMOL/L (ref 98–112)
CHLORIDE SERPL-SCNC: 112 MMOL/L (ref 98–112)
CHLORIDE SERPL-SCNC: 112 MMOL/L (ref 98–112)
CHOLEST SERPL-MCNC: 128 MG/DL (ref ?–200)
CO2 SERPL-SCNC: 20 MMOL/L (ref 21–32)
CO2 SERPL-SCNC: <10 MMOL/L (ref 21–32)
CO2 SERPL-SCNC: <10 MMOL/L (ref 21–32)
COHGB MFR BLD: 0 % SAT (ref 0–3)
COHGB MFR BLD: 0 % SAT (ref 0–3)
COHGB MFR BLD: 0.4 % SAT (ref 0–3)
COHGB MFR BLD: 1.1 % SAT (ref 0–3)
COHGB MFR BLD: 1.1 % SAT (ref 0–3)
COHGB MFR BLD: 1.5 % SAT (ref 0–3)
CREAT BLD-MCNC: 1.46 MG/DL (ref 0.7–1.3)
CREAT BLD-MCNC: 1.49 MG/DL (ref 0.7–1.3)
CREAT BLD-MCNC: 2.34 MG/DL (ref 0.7–1.3)
EGFRCR SERPLBLD CKD-EPI 2021: 30 ML/MIN/1.73M2 (ref 60–?)
EGFRCR SERPLBLD CKD-EPI 2021: 51 ML/MIN/1.73M2 (ref 60–?)
EGFRCR SERPLBLD CKD-EPI 2021: 53 ML/MIN/1.73M2 (ref 60–?)
EOSINOPHIL # BLD AUTO: 0 X10(3) UL (ref 0–0.7)
EOSINOPHIL # BLD: 0 X10(3) UL (ref 0–0.7)
EOSINOPHIL # BLD: 0.09 X10(3) UL (ref 0–0.7)
EOSINOPHIL NFR BLD AUTO: 0 %
EOSINOPHIL NFR BLD: 0 %
EOSINOPHIL NFR BLD: 1 %
ERYTHROCYTE [DISTWIDTH] IN BLOOD BY AUTOMATED COUNT: 13.8 %
ERYTHROCYTE [DISTWIDTH] IN BLOOD BY AUTOMATED COUNT: 14 %
ERYTHROCYTE [DISTWIDTH] IN BLOOD BY AUTOMATED COUNT: 14.4 %
FIO2: 100 %
GLOBULIN PLAS-MCNC: 2 G/DL (ref 2–3.5)
GLOBULIN PLAS-MCNC: 2 G/DL (ref 2–3.5)
GLUCOSE BLD-MCNC: 262 MG/DL (ref 70–99)
GLUCOSE BLD-MCNC: 291 MG/DL (ref 70–99)
GLUCOSE BLD-MCNC: 352 MG/DL (ref 70–99)
HCO3 BLDA-SCNC: 11.8 MEQ/L (ref 21–27)
HCO3 BLDA-SCNC: 14 MEQ/L (ref 21–27)
HCO3 BLDA-SCNC: 14.4 MEQ/L (ref 21–27)
HCO3 BLDA-SCNC: 17.4 MEQ/L (ref 21–27)
HCO3 BLDA-SCNC: 6.5 MEQ/L (ref 21–27)
HCO3 BLDA-SCNC: 6.8 MEQ/L (ref 21–27)
HCO3 BLDV-SCNC: 5.6 MEQ/L (ref 22–26)
HCT VFR BLD AUTO: 15.3 % (ref 39–53)
HCT VFR BLD AUTO: 32 % (ref 39–53)
HCT VFR BLD AUTO: 33.1 % (ref 39–53)
HDLC SERPL-MCNC: 26 MG/DL (ref 40–59)
HGB BLD-MCNC: 10.1 G/DL (ref 13–17.5)
HGB BLD-MCNC: 10.2 G/DL (ref 13–17.5)
HGB BLD-MCNC: 11.3 G/DL (ref 13–17.5)
HGB BLD-MCNC: 13.1 G/DL (ref 13–17.5)
HGB BLD-MCNC: 14 G/DL (ref 13–17.5)
HGB BLD-MCNC: 14.6 G/DL (ref 13–17.5)
HGB BLD-MCNC: 4.8 G/DL (ref 13–17.5)
HGB BLD-MCNC: 8.2 G/DL (ref 13–17.5)
HGB BLD-MCNC: <6.4 G/DL (ref 13–17.5)
IMM GRANULOCYTES # BLD AUTO: 0.38 X10(3) UL (ref 0–1)
IMM GRANULOCYTES NFR BLD: 1.4 %
INR BLD: 1.73 (ref 0.8–1.2)
ISTAT ACTIVATED CLOTTING TIME: 216 SECONDS (ref 74–137)
ISTAT ACTIVATED CLOTTING TIME: 221 SECONDS (ref 74–137)
ISTAT ACTIVATED CLOTTING TIME: 222 SECONDS (ref 74–137)
ISTAT ACTIVATED CLOTTING TIME: 371 SECONDS (ref 74–137)
LACTATE BLD-SCNC: 13.4 MMOL/L (ref 0.5–2)
LACTATE BLD-SCNC: 4.6 MMOL/L (ref 0.5–2)
LACTATE BLD-SCNC: 6.1 MMOL/L (ref 0.5–2)
LACTATE BLD-SCNC: 9.2 MMOL/L (ref 0.5–2)
LACTATE BLD-SCNC: 9.9 MMOL/L (ref 0.5–2)
LACTATE BLD-SCNC: >16.5 MMOL/L (ref 0.5–2)
LACTATE SERPL-SCNC: 21.7 MMOL/L (ref 0.5–2)
LDLC SERPL CALC-MCNC: 83 MG/DL (ref ?–100)
LYMPHOCYTES # BLD AUTO: 2.32 X10(3) UL (ref 1–4)
LYMPHOCYTES NFR BLD AUTO: 8.6 %
LYMPHOCYTES NFR BLD: 20 %
LYMPHOCYTES NFR BLD: 3.96 X10(3) UL (ref 1–4)
LYMPHOCYTES NFR BLD: 4.04 X10(3) UL (ref 1–4)
LYMPHOCYTES NFR BLD: 44 %
MCH RBC QN AUTO: 30 PG (ref 26–34)
MCH RBC QN AUTO: 30.1 PG (ref 26–34)
MCH RBC QN AUTO: 31.2 PG (ref 26–34)
MCHC RBC AUTO-ENTMCNC: 30.8 G/DL (ref 31–37)
MCHC RBC AUTO-ENTMCNC: 31.4 G/DL (ref 31–37)
MCHC RBC AUTO-ENTMCNC: 31.6 G/DL (ref 31–37)
MCV RBC AUTO: 95.5 FL (ref 80–100)
MCV RBC AUTO: 97.4 FL (ref 80–100)
MCV RBC AUTO: 99.4 FL (ref 80–100)
METAMYELOCYTES # BLD: 0.09 X10(3) UL (ref ?–0.01)
METAMYELOCYTES # BLD: 1.01 X10(3) UL (ref ?–0.01)
METAMYELOCYTES NFR BLD: 1 %
METAMYELOCYTES NFR BLD: 5 %
METHGB MFR BLD: 0 % SAT (ref 0.4–1.5)
METHGB MFR BLD: 0.2 % SAT (ref 0.4–1.5)
METHGB MFR BLD: 0.4 % SAT (ref 0.4–1.5)
METHGB MFR BLD: 0.7 % SAT (ref 0.4–1.5)
METHGB MFR BLD: 1 % SAT (ref 0.4–1.5)
METHGB MFR BLD: 2.2 % SAT (ref 0.4–1.5)
MONOCYTES # BLD AUTO: 1.88 X10(3) UL (ref 0.1–1)
MONOCYTES # BLD: 0.27 X10(3) UL (ref 0.1–1)
MONOCYTES # BLD: 0.81 X10(3) UL (ref 0.1–1)
MONOCYTES NFR BLD AUTO: 6.9 %
MONOCYTES NFR BLD: 3 %
MONOCYTES NFR BLD: 4 %
NEUTROPHILS # BLD AUTO: 14.8 X10 (3) UL (ref 1.5–7.7)
NEUTROPHILS # BLD AUTO: 2.72 X10 (3) UL (ref 1.5–7.7)
NEUTROPHILS # BLD AUTO: 22.46 X10 (3) UL (ref 1.5–7.7)
NEUTROPHILS # BLD AUTO: 22.46 X10(3) UL (ref 1.5–7.7)
NEUTROPHILS NFR BLD AUTO: 82.9 %
NEUTROPHILS NFR BLD: 42 %
NEUTROPHILS NFR BLD: 60 %
NEUTS BAND NFR BLD: 11 %
NEUTS BAND NFR BLD: 9 %
NEUTS HYPERSEG # BLD: 14.34 X10(3) UL (ref 1.5–7.7)
NEUTS HYPERSEG # BLD: 4.59 X10(3) UL (ref 1.5–7.7)
NONHDLC SERPL-MCNC: 102 MG/DL (ref ?–130)
NRBC BLD MANUAL-RTO: 1 % (ref ?–1)
NRBC BLD MANUAL-RTO: 3 % (ref ?–1)
OSMOLALITY SERPL CALC.SUM OF ELEC: 308 MOSM/KG (ref 275–295)
OSMOLALITY SERPL CALC.SUM OF ELEC: 309 MOSM/KG (ref 275–295)
OSMOLALITY SERPL CALC.SUM OF ELEC: 327 MOSM/KG (ref 275–295)
OXYHGB MFR BLDA: 17.7 % (ref 92–100)
OXYHGB MFR BLDA: 95.7 % (ref 92–100)
OXYHGB MFR BLDA: 96.5 % (ref 92–100)
OXYHGB MFR BLDA: 97.2 % (ref 92–100)
OXYHGB MFR BLDA: 97.8 % (ref 92–100)
OXYHGB MFR BLDA: 98 % (ref 92–100)
OXYHGB MFR BLDV: 45.1 % (ref 72–78)
P AXIS: 20 DEGREES
P-R INTERVAL: 182 MS
P/F RATIO: 95 MMHG
PCO2 BLDA: 23 MM HG (ref 35–45)
PCO2 BLDA: 26 MM HG (ref 35–45)
PCO2 BLDA: 34 MM HG (ref 35–45)
PCO2 BLDA: 35 MM HG (ref 35–45)
PCO2 BLDA: 40 MM HG (ref 35–45)
PCO2 BLDA: 77 MM HG (ref 35–45)
PCO2 BLDV: 44 MM HG (ref 38–50)
PEEP: 5 CM H2O
PH BLDA: 6.96 [PH] (ref 7.35–7.45)
PH BLDA: 7.02 [PH] (ref 7.35–7.45)
PH BLDA: 7.04 [PH] (ref 7.35–7.45)
PH BLDA: 7.19 [PH] (ref 7.35–7.45)
PH BLDA: 7.2 [PH] (ref 7.35–7.45)
PH BLDA: 7.24 [PH] (ref 7.35–7.45)
PH BLDV: 6.93 [PH] (ref 7.33–7.43)
PLATELET # BLD AUTO: 113 10(3)UL (ref 150–450)
PLATELET # BLD AUTO: 184 10(3)UL (ref 150–450)
PLATELET # BLD AUTO: 93 10(3)UL (ref 150–450)
PLATELET MORPHOLOGY: NORMAL
PLATELET MORPHOLOGY: NORMAL
PLATELETS.RETICULATED NFR BLD AUTO: 2.6 % (ref 0–7)
PO2 BLDA: 128 MM HG (ref 80–100)
PO2 BLDA: 132 MM HG (ref 80–100)
PO2 BLDA: 160 MM HG (ref 80–100)
PO2 BLDA: 18 MM HG (ref 80–100)
PO2 BLDA: 202 MM HG (ref 80–100)
PO2 BLDA: 95 MM HG (ref 80–100)
PO2 BLDV: 30 MM HG (ref 30–50)
POTASSIUM BLD-SCNC: 3.6 MMOL/L (ref 3.6–5.1)
POTASSIUM BLD-SCNC: 4.1 MMOL/L (ref 3.6–5.1)
POTASSIUM BLD-SCNC: 4.2 MMOL/L (ref 3.6–5.1)
POTASSIUM BLD-SCNC: 4.2 MMOL/L (ref 3.6–5.1)
POTASSIUM BLD-SCNC: 4.6 MMOL/L (ref 3.6–5.1)
POTASSIUM BLD-SCNC: 4.7 MMOL/L (ref 3.6–5.1)
POTASSIUM SERPL-SCNC: 4.2 MMOL/L (ref 3.5–5.1)
POTASSIUM SERPL-SCNC: 4.5 MMOL/L (ref 3.5–5.1)
POTASSIUM SERPL-SCNC: 4.6 MMOL/L (ref 3.5–5.1)
PROT SERPL-MCNC: 4.2 G/DL (ref 5.7–8.2)
PROT SERPL-MCNC: 5.3 G/DL (ref 5.7–8.2)
PROT SERPL-MCNC: <2 G/DL (ref 5.7–8.2)
PROTHROMBIN TIME: 20.5 SECONDS (ref 11.6–14.8)
Q-T INTERVAL: 354 MS
Q-T INTERVAL: 460 MS
QRS DURATION: 118 MS
QRS DURATION: 98 MS
QTC CALCULATION (BEZET): 440 MS
QTC CALCULATION (BEZET): 556 MS
R AXIS: -55 DEGREES
R AXIS: 91 DEGREES
RBC # BLD AUTO: 1.54 X10(6)UL (ref 3.8–5.8)
RBC # BLD AUTO: 3.35 X10(6)UL (ref 3.8–5.8)
RBC # BLD AUTO: 3.4 X10(6)UL (ref 3.8–5.8)
RH BLOOD TYPE: POSITIVE
SODIUM BLD-SCNC: 134 MMOL/L (ref 135–145)
SODIUM BLD-SCNC: 135 MMOL/L (ref 135–145)
SODIUM BLD-SCNC: 136 MMOL/L (ref 135–145)
SODIUM BLD-SCNC: 136 MMOL/L (ref 135–145)
SODIUM BLD-SCNC: 139 MMOL/L (ref 135–145)
SODIUM BLD-SCNC: 144 MMOL/L (ref 135–145)
SODIUM SERPL-SCNC: 140 MMOL/L (ref 136–145)
SODIUM SERPL-SCNC: 142 MMOL/L (ref 136–145)
SODIUM SERPL-SCNC: 151 MMOL/L (ref 136–145)
T AXIS: 19 DEGREES
T AXIS: 23 DEGREES
TIDAL VOLUME: 500 ML
TIDAL VOLUME: 500 ML
TIDAL VOLUME: 600 ML
TIDAL VOLUME: 600 ML
TOTAL CELLS COUNTED BLD: 100
TOTAL CELLS COUNTED BLD: 100
TRIGL SERPL-MCNC: 102 MG/DL (ref 30–149)
TROPONIN I SERPL HS-MCNC: 82 NG/L (ref ?–53)
UNIT VOLUME: 350 ML
VENT RATE: 20 /MIN
VENT RATE: 20 /MIN
VENT RATE: 24 /MIN
VENT RATE: 24 /MIN
VENTRICULAR RATE: 88 BPM
VENTRICULAR RATE: 93 BPM
VLDLC SERPL CALC-MCNC: 16 MG/DL (ref 0–30)
WBC # BLD AUTO: 20.2 X10(3) UL (ref 4–11)
WBC # BLD AUTO: 27.1 X10(3) UL (ref 4–11)
WBC # BLD AUTO: 9 X10(3) UL (ref 4–11)

## 2025-01-01 PROCEDURE — B2151ZZ FLUOROSCOPY OF LEFT HEART USING LOW OSMOLAR CONTRAST: ICD-10-PCS | Performed by: INTERNAL MEDICINE

## 2025-01-01 PROCEDURE — 0BH17EZ INSERTION OF ENDOTRACHEAL AIRWAY INTO TRACHEA, VIA NATURAL OR ARTIFICIAL OPENING: ICD-10-PCS | Performed by: EMERGENCY MEDICINE

## 2025-01-01 PROCEDURE — 99223 1ST HOSP IP/OBS HIGH 75: CPT | Performed by: STUDENT IN AN ORGANIZED HEALTH CARE EDUCATION/TRAINING PROGRAM

## 2025-01-01 PROCEDURE — 5A0221D ASSISTANCE WITH CARDIAC OUTPUT USING IMPELLER PUMP, CONTINUOUS: ICD-10-PCS | Performed by: INTERNAL MEDICINE

## 2025-01-01 PROCEDURE — 71045 X-RAY EXAM CHEST 1 VIEW: CPT | Performed by: INTERNAL MEDICINE

## 2025-01-01 PROCEDURE — B2111ZZ FLUOROSCOPY OF MULTIPLE CORONARY ARTERIES USING LOW OSMOLAR CONTRAST: ICD-10-PCS | Performed by: INTERNAL MEDICINE

## 2025-01-01 PROCEDURE — 4A023N8 MEASUREMENT OF CARDIAC SAMPLING AND PRESSURE, BILATERAL, PERCUTANEOUS APPROACH: ICD-10-PCS | Performed by: INTERNAL MEDICINE

## 2025-01-01 PROCEDURE — 5A1935Z RESPIRATORY VENTILATION, LESS THAN 24 CONSECUTIVE HOURS: ICD-10-PCS | Performed by: EMERGENCY MEDICINE

## 2025-01-01 PROCEDURE — 02HA3RZ INSERTION OF SHORT-TERM EXTERNAL HEART ASSIST SYSTEM INTO HEART, PERCUTANEOUS APPROACH: ICD-10-PCS | Performed by: INTERNAL MEDICINE

## 2025-01-01 PROCEDURE — 71045 X-RAY EXAM CHEST 1 VIEW: CPT | Performed by: EMERGENCY MEDICINE

## 2025-01-01 PROCEDURE — 02703ZZ DILATION OF CORONARY ARTERY, ONE ARTERY, PERCUTANEOUS APPROACH: ICD-10-PCS | Performed by: INTERNAL MEDICINE

## 2025-01-01 PROCEDURE — 3E033XZ INTRODUCTION OF VASOPRESSOR INTO PERIPHERAL VEIN, PERCUTANEOUS APPROACH: ICD-10-PCS | Performed by: EMERGENCY MEDICINE

## 2025-01-01 PROCEDURE — 70450 CT HEAD/BRAIN W/O DYE: CPT | Performed by: EMERGENCY MEDICINE

## 2025-01-01 PROCEDURE — 3E033GC INTRODUCTION OF OTHER THERAPEUTIC SUBSTANCE INTO PERIPHERAL VEIN, PERCUTANEOUS APPROACH: ICD-10-PCS | Performed by: INTERNAL MEDICINE

## 2025-01-01 PROCEDURE — B2131ZZ FLUOROSCOPY OF MULTIPLE CORONARY ARTERY BYPASS GRAFTS USING LOW OSMOLAR CONTRAST: ICD-10-PCS | Performed by: INTERNAL MEDICINE

## 2025-01-01 PROCEDURE — 99239 HOSP IP/OBS DSCHRG MGMT >30: CPT | Performed by: STUDENT IN AN ORGANIZED HEALTH CARE EDUCATION/TRAINING PROGRAM

## 2025-01-01 RX ORDER — NITROGLYCERIN 20 MG/100ML
INJECTION INTRAVENOUS
Status: COMPLETED
Start: 2025-01-01 | End: 2025-01-01

## 2025-01-01 RX ORDER — ACETAMINOPHEN 500 MG
500 TABLET ORAL EVERY 4 HOURS PRN
Status: DISCONTINUED | OUTPATIENT
Start: 2025-01-01 | End: 2025-01-01

## 2025-01-01 RX ORDER — PHENYLEPHRINE HCL IN 0.9% NACL 50MG/250ML
PLASTIC BAG, INJECTION (ML) INTRAVENOUS CONTINUOUS
Status: DISCONTINUED | OUTPATIENT
Start: 2025-01-01 | End: 2025-01-01

## 2025-01-01 RX ORDER — INDOMETHACIN 25 MG/1
CAPSULE ORAL
Status: DISCONTINUED
Start: 2025-01-01 | End: 2025-01-01

## 2025-01-01 RX ORDER — MIDAZOLAM HYDROCHLORIDE 1 MG/ML
INJECTION INTRAMUSCULAR; INTRAVENOUS
Status: COMPLETED
Start: 2025-01-01 | End: 2025-01-01

## 2025-01-01 RX ORDER — HEPARIN SODIUM AND DEXTROSE 10000; 5 [USP'U]/100ML; G/100ML
INJECTION INTRAVENOUS CONTINUOUS
Status: DISCONTINUED | OUTPATIENT
Start: 2025-01-01 | End: 2025-01-01

## 2025-01-01 RX ORDER — CLOPIDOGREL BISULFATE 75 MG/1
75 TABLET ORAL DAILY
Status: DISCONTINUED | OUTPATIENT
Start: 2025-01-01 | End: 2025-01-01

## 2025-01-01 RX ORDER — NOREPINEPHRINE BITARTRATE 1 MG/ML
INJECTION, SOLUTION INTRAVENOUS
Status: COMPLETED
Start: 2025-01-01 | End: 2025-01-01

## 2025-01-01 RX ORDER — MORPHINE SULFATE 4 MG/ML
4 INJECTION, SOLUTION INTRAMUSCULAR; INTRAVENOUS EVERY 2 HOUR PRN
Status: DISCONTINUED | OUTPATIENT
Start: 2025-01-01 | End: 2025-01-01

## 2025-01-01 RX ORDER — SODIUM PHOSPHATE, DIBASIC AND SODIUM PHOSPHATE, MONOBASIC 7; 19 G/230ML; G/230ML
1 ENEMA RECTAL ONCE AS NEEDED
Status: DISCONTINUED | OUTPATIENT
Start: 2025-01-01 | End: 2025-01-01

## 2025-01-01 RX ORDER — ONDANSETRON 2 MG/ML
4 INJECTION INTRAMUSCULAR; INTRAVENOUS EVERY 6 HOURS PRN
Status: DISCONTINUED | OUTPATIENT
Start: 2025-01-01 | End: 2025-01-01

## 2025-01-01 RX ORDER — SODIUM CHLORIDE 9 MG/ML
INJECTION, SOLUTION INTRAVENOUS CONTINUOUS
Status: ACTIVE | OUTPATIENT
Start: 2025-01-01 | End: 2025-01-01

## 2025-01-01 RX ORDER — MORPHINE SULFATE 2 MG/ML
2 INJECTION, SOLUTION INTRAMUSCULAR; INTRAVENOUS EVERY 2 HOUR PRN
Status: DISCONTINUED | OUTPATIENT
Start: 2025-01-01 | End: 2025-01-01

## 2025-01-01 RX ORDER — INDOMETHACIN 25 MG/1
50 CAPSULE ORAL ONCE
Status: DISCONTINUED | OUTPATIENT
Start: 2025-01-01 | End: 2025-01-01

## 2025-01-01 RX ORDER — EPTIFIBATIDE 0.75 MG/ML
INJECTION, SOLUTION INTRAVENOUS
Status: COMPLETED
Start: 2025-01-01 | End: 2025-01-01

## 2025-01-01 RX ORDER — INDOMETHACIN 25 MG/1
CAPSULE ORAL
Status: COMPLETED | OUTPATIENT
Start: 2025-01-01 | End: 2025-01-01

## 2025-01-01 RX ORDER — POLYETHYLENE GLYCOL 3350 17 G/17G
17 POWDER, FOR SOLUTION ORAL DAILY PRN
Status: DISCONTINUED | OUTPATIENT
Start: 2025-01-01 | End: 2025-01-01

## 2025-01-01 RX ORDER — HEPARIN SODIUM 5000 [USP'U]/ML
INJECTION, SOLUTION INTRAVENOUS; SUBCUTANEOUS
Status: COMPLETED
Start: 2025-01-01 | End: 2025-01-01

## 2025-01-01 RX ORDER — SENNOSIDES 8.6 MG
17.2 TABLET ORAL NIGHTLY PRN
Status: DISCONTINUED | OUTPATIENT
Start: 2025-01-01 | End: 2025-01-01

## 2025-01-01 RX ORDER — MORPHINE SULFATE 2 MG/ML
1 INJECTION, SOLUTION INTRAMUSCULAR; INTRAVENOUS EVERY 2 HOUR PRN
Status: DISCONTINUED | OUTPATIENT
Start: 2025-01-01 | End: 2025-01-01

## 2025-01-01 RX ORDER — ECHINACEA PURPUREA EXTRACT 125 MG
1 TABLET ORAL
Status: DISCONTINUED | OUTPATIENT
Start: 2025-01-01 | End: 2025-01-01

## 2025-01-01 RX ORDER — ROSUVASTATIN CALCIUM 20 MG/1
20 TABLET, COATED ORAL NIGHTLY
Status: DISCONTINUED | OUTPATIENT
Start: 2025-01-01 | End: 2025-01-01

## 2025-01-01 RX ORDER — METOCLOPRAMIDE HYDROCHLORIDE 5 MG/ML
5 INJECTION INTRAMUSCULAR; INTRAVENOUS EVERY 8 HOURS PRN
Status: DISCONTINUED | OUTPATIENT
Start: 2025-01-01 | End: 2025-01-01

## 2025-01-01 RX ORDER — BISACODYL 10 MG
10 SUPPOSITORY, RECTAL RECTAL
Status: DISCONTINUED | OUTPATIENT
Start: 2025-01-01 | End: 2025-01-01

## 2025-01-01 RX ORDER — AMIODARONE HYDROCHLORIDE 450 MG/9ML
INJECTION, SOLUTION INTRAVENOUS
Status: COMPLETED
Start: 2025-01-01 | End: 2025-01-01

## 2025-01-01 RX ORDER — BENZONATATE 100 MG/1
200 CAPSULE ORAL 3 TIMES DAILY PRN
Status: DISCONTINUED | OUTPATIENT
Start: 2025-01-01 | End: 2025-01-01

## 2025-01-01 RX ORDER — LIDOCAINE HYDROCHLORIDE 10 MG/ML
INJECTION, SOLUTION EPIDURAL; INFILTRATION; INTRACAUDAL; PERINEURAL
Status: COMPLETED
Start: 2025-01-01 | End: 2025-01-01

## 2025-01-01 RX ORDER — INDOMETHACIN 25 MG/1
CAPSULE ORAL
Status: COMPLETED
Start: 2025-01-01 | End: 2025-01-01

## 2025-01-01 RX ORDER — INDOMETHACIN 25 MG/1
100 CAPSULE ORAL ONCE
Status: COMPLETED | OUTPATIENT
Start: 2025-01-01 | End: 2025-01-01

## 2025-01-01 RX ORDER — SODIUM CHLORIDE 9 MG/ML
INJECTION, SOLUTION INTRAVENOUS ONCE
Status: DISCONTINUED | OUTPATIENT
Start: 2025-01-01 | End: 2025-01-01

## 2025-01-01 RX ORDER — ASPIRIN 81 MG/1
81 TABLET ORAL DAILY
Status: DISCONTINUED | OUTPATIENT
Start: 2025-01-01 | End: 2025-01-01

## 2025-01-01 RX ORDER — INDOMETHACIN 25 MG/1
50 CAPSULE ORAL ONCE
Status: CANCELLED | OUTPATIENT
Start: 2025-01-01 | End: 2025-01-01

## 2025-01-01 RX ORDER — GUAIFENESIN 600 MG/1
600 TABLET, EXTENDED RELEASE ORAL 2 TIMES DAILY PRN
Status: DISCONTINUED | OUTPATIENT
Start: 2025-01-01 | End: 2025-01-01

## 2025-01-29 ENCOUNTER — OFFICE VISIT (OUTPATIENT)
Dept: SURGERY | Facility: CLINIC | Age: 65
End: 2025-01-29

## 2025-01-29 DIAGNOSIS — R97.20 ELEVATED PSA: Primary | ICD-10-CM

## 2025-01-29 LAB
APPEARANCE: CLEAR
BILIRUBIN: NEGATIVE
GLUCOSE (URINE DIPSTICK): NEGATIVE MG/DL
KETONES (URINE DIPSTICK): NEGATIVE MG/DL
LEUKOCYTES: NEGATIVE
MULTISTIX LOT#: NORMAL NUMERIC
NITRITE, URINE: NEGATIVE
OCCULT BLOOD: NEGATIVE
PH, URINE: 6 (ref 4.5–8)
PROTEIN (URINE DIPSTICK): NEGATIVE MG/DL
SPECIFIC GRAVITY: 1.02 (ref 1–1.03)
URINE-COLOR: YELLOW
UROBILINOGEN,SEMI-QN: 0.2 MG/DL (ref 0–1.9)

## 2025-01-29 PROCEDURE — 99214 OFFICE O/P EST MOD 30 MIN: CPT | Performed by: UROLOGY

## 2025-01-29 PROCEDURE — 51798 US URINE CAPACITY MEASURE: CPT | Performed by: UROLOGY

## 2025-01-29 PROCEDURE — 81003 URINALYSIS AUTO W/O SCOPE: CPT | Performed by: UROLOGY

## 2025-01-29 NOTE — PROGRESS NOTES
Urology Clinic Note    Primary Care Provider:  Avis Weeks MD     Chief Complaint:   Elev psa       HPI:      Doreen Steiner is a 64 year old male with history of CAD sp CABG, HLD referred for elevated PSA.     Patient has no previous urologic history.  He has had routine screening PSAs with his primary.  Trend as below.  Most recently has been elevated at 4.33 from normal 2 years ago.  He has no family history of prostate cancer.  He has no recent weight loss or bone pain.  History of appendectomy otherwise no intra-abdominal surgeries.  He has no significant urinary issues, his AUA symptom score is 5.  Erections are adequate.  He denies hematuria or dysuria.  UA was negative.     At last visit decision was made to proceed with 4k score;   4k score: 9.8   PSA: 3.98   fPSA: 16     Given his above results; a MRI was done 3/1/24; Prostate is 19cc; PIRADS 2 lesion is noted.   Last seen 3/14/24; plan at that time was observation with PSA repeat in about 6 mo  Here now with PSA- 9/29/24; 3.8  No changes to his health. He is doing well today.          PSA:  Lab Results   Component Value Date    PSA 4.44 (H) 02/01/2024    PSAS 3.80 09/29/2024    PSAS 4.33 (H) 07/12/2023    PSAS 3.55 07/24/2021    PSAS 2.30 06/23/2018        History:     Past Medical History:    Acute, but ill-defined, cerebrovascular disease    Back pain    CAD (coronary artery disease)    2004    Essential hypertension    Heart attack (HCC)    High blood pressure    High cholesterol    Hyperlipidemia    Prediabetes    Stress    Weight gain       Past Surgical History:   Procedure Laterality Date    Appendectomy      Bypass surgery  2004    x4 , 2004    Cabg      Cath drug eluting stent         Family History   Problem Relation Age of Onset    Other (etoh) Father     Cancer Mother         lung ca       Social History     Socioeconomic History    Marital status:    Tobacco Use    Smoking status: Never    Smokeless tobacco: Never   Vaping Use     Vaping status: Never Used   Substance and Sexual Activity    Alcohol use: No    Drug use: No     Social Drivers of Health     Food Insecurity: No Food Insecurity (7/18/2024)    Food Insecurity     Food Insecurity: Never true   Transportation Needs: No Transportation Needs (7/18/2024)    Transportation Needs     Lack of Transportation: No   Physical Activity: Insufficiently Active (10/28/2020)    [facility]    Exercise Vital Sign     Days of Exercise per Week: 3 days     Minutes of Exercise per Session: 40 min   Housing Stability: Low Risk  (7/18/2024)    Housing Stability     Housing Instability: No       Medications (Active prior to today's visit):  Current Outpatient Medications   Medication Sig Dispense Refill    RYBELSUS 14 MG Oral Tab Take 1 tablet by mouth daily.      clopidogrel 75 MG Oral Tab Take 1 tablet (75 mg total) by mouth daily. 30 tablet 3    metoprolol succinate ER 50 MG Oral Tablet 24 Hr Take 1 tablet (50 mg total) by mouth Daily Beta Blocker. 30 tablet 3    apixaban 5 MG Oral Tab Take 1 tablet (5 mg total) by mouth 2 (two) times daily. 60 tablet 0    rosuvastatin 20 MG Oral Tab Take 1 tablet (20 mg total) by mouth nightly.         Allergies:  [Allergies]    [Allergies]  No Known Allergies    Review of Systems:   A comprehensive 10-point review of systems was completed.  Pertinent positives and negatives are noted in the the HPI.    Physical Exam:   CONSTITUTIONAL: Well developed, well nourished, in no acute distress  ABDOMEN: Soft, non-tender, non-distended     Assessment & Plan:   Elevated PSA    I again had a detailed conversation regarding patient's history of elevated PSA, intermediate risk 4K score, MRI with PI-RADS 2 lesion.  This lesion was likely inflammatory based on radiology report.  We again discussed options of biopsy versus observation.  His recent PSA is downtrending and is within normal limits now.  He would like to continue with  observation.  He will return in 6 months with a PSA.  He will let me know if there is any changes in the interval.    In total, 30 minutes were spent on this patient encounter (including chart review, patient history, physical, and counseling, documentation, and communication).    Clayton Garcia MD  Staff Urologist  [facility]  Office: [phone]   none

## 2025-03-16 ENCOUNTER — LAB ENCOUNTER (OUTPATIENT)
Dept: LAB | Age: 65
End: 2025-03-16
Attending: UROLOGY
Payer: COMMERCIAL

## 2025-03-16 DIAGNOSIS — E55.9 VITAMIN D DEFICIENCY: ICD-10-CM

## 2025-03-16 DIAGNOSIS — R97.20 ELEVATED PSA: ICD-10-CM

## 2025-03-16 DIAGNOSIS — E78.00 HYPERCHOLESTEROLEMIA: ICD-10-CM

## 2025-03-16 DIAGNOSIS — E11.9 TYPE 2 DIABETES MELLITUS WITHOUT COMPLICATION, WITHOUT LONG-TERM CURRENT USE OF INSULIN (HCC): ICD-10-CM

## 2025-03-16 LAB
ALBUMIN SERPL-MCNC: 4.4 G/DL (ref 3.2–4.8)
ALBUMIN/GLOB SERPL: 1.9 {RATIO} (ref 1–2)
ALP LIVER SERPL-CCNC: 90 U/L
ALT SERPL-CCNC: 34 U/L
ANION GAP SERPL CALC-SCNC: 6 MMOL/L (ref 0–18)
AST SERPL-CCNC: 26 U/L (ref ?–34)
BILIRUB SERPL-MCNC: 0.7 MG/DL (ref 0.2–1.1)
BUN BLD-MCNC: 17 MG/DL (ref 9–23)
BUN/CREAT SERPL: 17.5 (ref 10–20)
CALCIUM BLD-MCNC: 9.3 MG/DL (ref 8.7–10.4)
CHLORIDE SERPL-SCNC: 108 MMOL/L (ref 98–112)
CHOLEST SERPL-MCNC: 108 MG/DL (ref ?–200)
CO2 SERPL-SCNC: 27 MMOL/L (ref 21–32)
CREAT BLD-MCNC: 0.97 MG/DL
CREAT UR-SCNC: 214.4 MG/DL
EGFRCR SERPLBLD CKD-EPI 2021: 87 ML/MIN/1.73M2 (ref 60–?)
EST. AVERAGE GLUCOSE BLD GHB EST-MCNC: 137 MG/DL (ref 68–126)
FASTING PATIENT LIPID ANSWER: YES
FASTING STATUS PATIENT QL REPORTED: YES
GLOBULIN PLAS-MCNC: 2.3 G/DL (ref 2–3.5)
GLUCOSE BLD-MCNC: 135 MG/DL (ref 70–99)
HBA1C MFR BLD: 6.4 % (ref ?–5.7)
HDLC SERPL-MCNC: 38 MG/DL (ref 40–59)
LDLC SERPL CALC-MCNC: 51 MG/DL (ref ?–100)
MICROALBUMIN UR-MCNC: 24.7 MG/DL
MICROALBUMIN/CREAT 24H UR-RTO: 115.2 UG/MG (ref ?–30)
NONHDLC SERPL-MCNC: 70 MG/DL (ref ?–130)
OSMOLALITY SERPL CALC.SUM OF ELEC: 296 MOSM/KG (ref 275–295)
POTASSIUM SERPL-SCNC: 5.3 MMOL/L (ref 3.5–5.1)
PROT SERPL-MCNC: 6.7 G/DL (ref 5.7–8.2)
SODIUM SERPL-SCNC: 141 MMOL/L (ref 136–145)
TRIGL SERPL-MCNC: 98 MG/DL (ref 30–149)
VIT D+METAB SERPL-MCNC: 28 NG/ML (ref 30–100)
VLDLC SERPL CALC-MCNC: 14 MG/DL (ref 0–30)

## 2025-03-16 PROCEDURE — 80061 LIPID PANEL: CPT

## 2025-03-16 PROCEDURE — 82043 UR ALBUMIN QUANTITATIVE: CPT

## 2025-03-16 PROCEDURE — 83036 HEMOGLOBIN GLYCOSYLATED A1C: CPT

## 2025-03-16 PROCEDURE — 36415 COLL VENOUS BLD VENIPUNCTURE: CPT

## 2025-03-16 PROCEDURE — 82306 VITAMIN D 25 HYDROXY: CPT

## 2025-03-16 PROCEDURE — 82570 ASSAY OF URINE CREATININE: CPT

## 2025-03-16 PROCEDURE — 80053 COMPREHEN METABOLIC PANEL: CPT

## 2025-03-18 DIAGNOSIS — E87.5 HYPERKALEMIA: Primary | ICD-10-CM

## 2025-03-18 DIAGNOSIS — E55.9 VITAMIN D DEFICIENCY: ICD-10-CM

## 2025-03-18 RX ORDER — ERGOCALCIFEROL 1.25 MG/1
50000 CAPSULE, LIQUID FILLED ORAL WEEKLY
Qty: 12 CAPSULE | Refills: 0 | Status: SHIPPED | OUTPATIENT
Start: 2025-03-18

## 2025-04-12 ENCOUNTER — LAB ENCOUNTER (OUTPATIENT)
Dept: LAB | Age: 65
End: 2025-04-12
Attending: FAMILY MEDICINE
Payer: COMMERCIAL

## 2025-04-12 ENCOUNTER — OFFICE VISIT (OUTPATIENT)
Dept: FAMILY MEDICINE CLINIC | Facility: CLINIC | Age: 65
End: 2025-04-12
Payer: COMMERCIAL

## 2025-04-12 VITALS
RESPIRATION RATE: 16 BRPM | BODY MASS INDEX: 30.71 KG/M2 | DIASTOLIC BLOOD PRESSURE: 68 MMHG | WEIGHT: 219.38 LBS | HEART RATE: 58 BPM | TEMPERATURE: 98 F | SYSTOLIC BLOOD PRESSURE: 117 MMHG | HEIGHT: 71 IN

## 2025-04-12 DIAGNOSIS — Z95.1 S/P CABG (CORONARY ARTERY BYPASS GRAFT): ICD-10-CM

## 2025-04-12 DIAGNOSIS — E78.00 HYPERCHOLESTEROLEMIA: ICD-10-CM

## 2025-04-12 DIAGNOSIS — L84 CALLUS OF FOOT: ICD-10-CM

## 2025-04-12 DIAGNOSIS — E87.5 HYPERKALEMIA: ICD-10-CM

## 2025-04-12 DIAGNOSIS — R42 DIZZINESS: ICD-10-CM

## 2025-04-12 DIAGNOSIS — R97.20 ELEVATED PSA: ICD-10-CM

## 2025-04-12 DIAGNOSIS — Z00.00 LABORATORY EXAMINATION ORDERED AS PART OF A ROUTINE GENERAL MEDICAL EXAMINATION: ICD-10-CM

## 2025-04-12 DIAGNOSIS — E11.9 TYPE 2 DIABETES MELLITUS WITHOUT COMPLICATION, WITHOUT LONG-TERM CURRENT USE OF INSULIN (HCC): Primary | ICD-10-CM

## 2025-04-12 DIAGNOSIS — I25.810 CORONARY ARTERY DISEASE INVOLVING CORONARY BYPASS GRAFT OF NATIVE HEART WITHOUT ANGINA PECTORIS: ICD-10-CM

## 2025-04-12 DIAGNOSIS — E55.9 VITAMIN D DEFICIENCY: ICD-10-CM

## 2025-04-12 LAB
ANION GAP SERPL CALC-SCNC: 7 MMOL/L (ref 0–18)
BUN BLD-MCNC: 22 MG/DL (ref 9–23)
BUN/CREAT SERPL: 23.4 (ref 10–20)
CALCIUM BLD-MCNC: 9.2 MG/DL (ref 8.7–10.4)
CHLORIDE SERPL-SCNC: 110 MMOL/L (ref 98–112)
CO2 SERPL-SCNC: 24 MMOL/L (ref 21–32)
CREAT BLD-MCNC: 0.94 MG/DL (ref 0.7–1.3)
EGFRCR SERPLBLD CKD-EPI 2021: 90 ML/MIN/1.73M2 (ref 60–?)
FASTING STATUS PATIENT QL REPORTED: YES
GLUCOSE BLD-MCNC: 109 MG/DL (ref 70–99)
OSMOLALITY SERPL CALC.SUM OF ELEC: 296 MOSM/KG (ref 275–295)
POTASSIUM SERPL-SCNC: 5.2 MMOL/L (ref 3.5–5.1)
SODIUM SERPL-SCNC: 141 MMOL/L (ref 136–145)

## 2025-04-12 PROCEDURE — 80048 BASIC METABOLIC PNL TOTAL CA: CPT

## 2025-04-12 PROCEDURE — 36415 COLL VENOUS BLD VENIPUNCTURE: CPT

## 2025-04-12 NOTE — PATIENT INSTRUCTIONS
Continue current meds.   Watch diet for fats and carbs.   Stay active.    Do drink more water recommendations to have 2 L of water a day.  Monitor your dizziness.  Schedule appointment with foot specialist.  Do not take vitamin D for 2 weeks , then you can take vitamin D3 1000 units daily.  Do blood test in July prior physical.    VISIT SUMMARY:  During your visit, we discussed your dizziness, diabetes management, vitamin D intake, and foot care. We also reviewed your general health maintenance and set goals for hydration, exercise, and weight management.    YOUR PLAN:  -DIZZINESS: Your dizziness is likely due to dehydration, low blood pressure when standing up, or inner ear issues. To help with this, you should increase your water intake to 2 liters per day and monitor your blood pressure regularly, especially in the morning. We will also review your blood test results for potassium and kidney function once they are available.    -VITAMIN D OVERDOSE: Taking too much vitamin D can cause dizziness and fatigue. You should stop taking the high-dose vitamin D for two weeks and then switch to an over-the-counter vitamin D supplement at 1,000 IU daily.    -TYPE 2 DIABETES MELLITUS: Your diabetes is well-controlled with your current medication, Rybelsus, and your hemoglobin A1c level is 6.4%. Continue taking Rybelsus as prescribed, and try to engage in regular exercise to improve your energy levels and help with weight management. We will do follow-up blood work in July to monitor your diabetes and other health parameters.    -CALLUSES AND POSSIBLE FUNGAL INFECTION: You have calluses and a possible fungal infection on your feet, which is concerning due to the risk of infection with diabetes. You should see a podiatrist, Dr. Loera, for evaluation and management of these issues.    -GENERAL HEALTH MAINTENANCE: We discussed the importance of exercise, weight management, and hydration for your overall health and diabetes  management. Your current weight is 219 pounds, and the goal is to reduce it to 200 pounds. Aim to do aerobic exercises like walking, biking, or using a treadmill. We will schedule a comprehensive physical examination in July, including tests for anemia and thyroid function.    INSTRUCTIONS:  Please schedule a follow-up appointment in July for a physical examination and additional blood tests. Monitor your dizziness and report if symptoms persist or worsen.    Contains text generated by [employer]       Refill policies:      Allow 3 business days for refills; controlled substances may take longer.  Contact your pharmacy at least 5-7 business days prior to running out of medication and have them send an electronic request or submit through the \"request refill\" option thru your [employer] account. No need to do both, as multiple requests will create an automated [employer] message to notify of a denial for one of the duplicated requests, causing you undue confusion.   Refills are NOT addressed on weekends; covering physicians do not authorize routine medications on weekends.  No narcotics or controlled substances are refilled after noon on Fridays or by on call physicians.  By law, narcotics cannot be faxed or phoned into your pharmacy.  If your prescription is due for a refill, you may be due for a follow up appointment. Please call our office at [phone] to make an appointment or schedule an appointment via [employer].  To best provide you care, patients receiving routine medications need to be seen at least twice a year. Patients receiving narcotic/controlled substance medications need to be seen at least once every 3 months.  In the event that your preferred pharmacy does not have the requested medication in stock (ie Backordered), it is your responsibility to find another pharmacy that has the requested medication available. We will gladly send a new prescription to that pharmacy at your request.  controlled substances  may not be able to be filled out of state due to license restrictions.  If you have a planned trip, it's best to call your pharmacy at least 5-7 business days to prevent any delays in your medication refill.    Scheduling Tests:    If your physician has ordered radiology tests such as MRI or CT scans, please contact Central Scheduling at [phone] right away to schedule the test.  Once scheduled, the [facility] Centralized Referral Team will work with your insurance carrier to obtain pre-certification or prior authorization.  Depending on your insurance carrier, approval may take 3-10 days.  It is highly recommended patients assure they have received an authorization before having a test performed.  If test is done without insurance authorization, patient may be responsible for the entire amount billed.      Precertification and Prior Authorizations:  If your physician has recommended that you have a procedure or additional testing performed the [facility] Centralized Referral Team will contact your insurance carrier to obtain pre-certification or prior authorization.    You are strongly encouraged to contact your insurance carrier to verify that your procedure/test has been approved and is a COVERED benefit.  Although the [facility] Centralized Referral Team does its due diligence, the insurance carrier gives the disclaimer that \"Although the procedure is authorized, this does not guarantee payment.\"    Ultimately the patient is responsible for payment.   Thank you for your understanding in this matter.  Paperwork Completion:  If you require FMLA or disability paperwork for your recovery, please make sure to either drop it off or have it faxed to our office at [phone]. Be sure the form has your name and date of birth on it.  The form will be faxed to our Forms Department and they will complete it for you.  There is a 25$ fee for all forms that need to be filled out.  Please be aware there is a 10-14 day turnaround time.  You  will need to sign a release of information (MEL) form if your paperwork does not come with one.  You may call the Forms Department with any questions at [phone].  Their fax number is [phone].

## 2025-04-12 NOTE — PROGRESS NOTES
Doreen Steiner is a 64 year old male.  Diabetes, stress, hypercholesterolemia, coronary artery disease,  HPI:     The following individual(s) verbally consented to be recorded using ambient AI listening technology and understand that they can each withdraw their consent to this listening technology at any point by asking the clinician to turn off or pause the recording:    Patient name: Doreen Steiner      History of Present Illness  Doreen Steiner is a 64 year old male with diabetes who presents with dizziness.    He experiences dizziness, particularly in the mornings, described as a sensation of the room spinning. This occurs when changing positions, such as moving from lying down to standing.The dizziness has been present intermittently for about three weeks, with some days being symptom-free. No associated feeling of being off balance. He drinks about 16 ounces of water per day, which may be insufficient.    He has been taking high-dose vitamin D, 50,000 IU once a day, but recently switched to a regular vitamin D supplement. He suspects that the high-dose vitamin D might be contributing to his symptoms.    He has a history of diabetes, with a hemoglobin A1c of 6.4, indicating controlled diabetes. He is currently taking Rybelsus for diabetes management and has been on this medication for about a year. He has concerns about his energy levels and mentions engaging in some physical activity, such as going to a club and lifting weights, although he is cautious about heavy lifting due to chest discomfort.    He mentions a history of high potassium levels, with a recent blood test conducted to monitor this. He is aware of the need to manage his blood pressure, which was previously recorded as low at times, potentially contributing to his dizziness. He takes metoprolol and rosuvastatin.    He reports dry skin and calluses on his feet, which he attributes to the winter season.     No recent sinus congestion, runny nose, or  stuffiness.    Hypercholesterolemia patient is taking rosuvastatin prescribed by cardiologist doing well cholesterol numbers are stable.  Monitor.    Coronary artery disease status post CABG, recently patient had angiogram done with 3 stents.  Had chest pain.  He is seeing cardiologist regularly.  Seeing Dr. Gilbert zamudio  Doing well asymptomatic no chest pain no shortness of breath no palpitations.    Elevated PSA patient saw urologist, had MRI done . The patient needs to follow-up with urologist needs referral to see them.       Current Outpatient Medications   Medication Sig Dispense Refill    ergocalciferol 1.25 MG (34461 UT) Oral Cap Take 1 capsule (50,000 Units total) by mouth once a week. For 12 weeks 12 capsule 0    RYBELSUS 14 MG Oral Tab Take 1 tablet by mouth daily.      clopidogrel 75 MG Oral Tab Take 1 tablet (75 mg total) by mouth daily. 30 tablet 3    metoprolol succinate ER 50 MG Oral Tablet 24 Hr Take 1 tablet (50 mg total) by mouth Daily Beta Blocker. 30 tablet 3    apixaban 5 MG Oral Tab Take 1 tablet (5 mg total) by mouth 2 (two) times daily. 60 tablet 0    rosuvastatin 20 MG Oral Tab Take 1 tablet (20 mg total) by mouth nightly.        Past Medical History:    Acute, but ill-defined, cerebrovascular disease    Back pain    CAD (coronary artery disease)    2004    Essential hypertension    Heart attack (HCC)    High blood pressure    High cholesterol    Hyperlipidemia    Prediabetes    Stress    Weight gain      Social History:  Social History     Socioeconomic History    Marital status:    Tobacco Use    Smoking status: Never    Smokeless tobacco: Never   Vaping Use    Vaping status: Never Used   Substance and Sexual Activity    Alcohol use: No    Drug use: No     Social Drivers of Health     Food Insecurity: No Food Insecurity (7/18/2024)    Food Insecurity     Food Insecurity: Never true   Transportation Needs: No Transportation Needs (7/18/2024)    Transportation Needs     Lack of  Transportation: No   Housing Stability: Low Risk  (7/18/2024)    Housing Stability     Housing Instability: No        REVIEW OF SYSTEMS:   GENERAL HEALTH: feels well otherwise  SKIN: denies any unusual skin lesions or rashes  HEENT no congestion no runny nose no sore throat, having occasional dizziness but no symptoms for 2 days.  Neck no neck pain  RESPIRATORY: denies shortness of breath with exertion  CARDIOVASCULAR: denies chest pain on exertion  GI: denies abdominal pain and denies heartburn  NEURO: denies headaches  Psych normal mood.    EXAM:   /68 (BP Location: Left arm, Patient Position: Sitting, Cuff Size: adult)   Pulse 58   Temp 98.1 °F (36.7 °C) (Temporal)   Resp 16   Ht 5' 11\" (1.803 m)   Wt 219 lb 6.4 oz (99.5 kg)   BMI 30.60 kg/m²   GENERAL: well developed, well nourished,in no apparent distress  SKIN: no rashes,no suspicious lesions  HEENT: atraumatic, normocephalic,ears and throat are clear  NECK: supple,no adenopathy,  LUNGS: clear to auscultation no wheezes no crackles  CARDIO: RRR without murmur  GI: good BS's,no masses, HSM or tenderness  EXTREMITIES: no edema  Bilateral barefoot skin diabetic exam is normal, visualized feet with osteoarthritis changes of the feet having dryness and peeling of the skin of the bilateral feet, have a calluses bilateral feet at the metatarsal head plantar aspect,  Bilateral monofilament/sensation of both feet is normal.  Pulsation pedal pulse exam of both lower legs/feet is normal as well.     Psychiatric - alert  and oriented x3, normal mood     Results for orders placed or performed in visit on 03/16/25   Comp Metabolic Panel (14)    Collection Time: 03/16/25  7:16 AM   Result Value Ref Range    Glucose 135 (H) 70 - 99 mg/dL    Sodium 141 136 - 145 mmol/L    Potassium 5.3 (H) 3.5 - 5.1 mmol/L    Chloride 108 98 - 112 mmol/L    CO2 27.0 21.0 - 32.0 mmol/L    Anion Gap 6 0 - 18 mmol/L    BUN 17 9 - 23 mg/dL    Creatinine 0.97 0.70 - 1.30 mg/dL     BUN/CREA Ratio 17.5 10.0 - 20.0    Calcium, Total 9.3 8.7 - 10.4 mg/dL    Calculated Osmolality 296 (H) 275 - 295 mOsm/kg    eGFR-Cr 87 >=60 mL/min/1.73m2    ALT 34 10 - 49 U/L    AST 26 <34 U/L    Alkaline Phosphatase 90 45 - 117 U/L    Bilirubin, Total 0.7 0.2 - 1.1 mg/dL    Total Protein 6.7 5.7 - 8.2 g/dL    Albumin 4.4 3.2 - 4.8 g/dL    Globulin  2.3 2.0 - 3.5 g/dL    A/G Ratio 1.9 1.0 - 2.0    Patient Fasting for CMP? Yes    Hemoglobin A1C    Collection Time: 03/16/25  7:16 AM   Result Value Ref Range    HgbA1C 6.4 (H) <5.7 %    Estimated Average Glucose 137 (H) 68 - 126 mg/dL   Microalb/Creat Ratio, Random Urine    Collection Time: 03/16/25  7:16 AM   Result Value Ref Range    Microalbumin, Urine 24.70 mg/dL    Creatinine Ur Random 214.40 mg/dL    Malb/Cre Calc 115.2 (H) <=30.0 ug/mg   Lipid Panel    Collection Time: 03/16/25  7:16 AM   Result Value Ref Range    Cholesterol, Total 108 <200 mg/dL    HDL Cholesterol 38 (L) 40 - 59 mg/dL    Triglycerides 98 30 - 149 mg/dL    LDL Cholesterol 51 <100 mg/dL    VLDL 14 0 - 30 mg/dL    Non HDL Chol 70 <130 mg/dL    Patient Fasting for Lipid? Yes    Vitamin D, 25-Hydroxy    Collection Time: 03/16/25  7:16 AM   Result Value Ref Range    Vitamin D, 25OH, Total 28.0 (L) 30.0 - 100.0 ng/mL     ASSESSMENT AND PLAN:     Encounter Diagnoses   Name Primary?    Type 2 diabetes mellitus without complication, without long-term current use of insulin (HCC) Yes    Hypercholesterolemia     Vitamin D deficiency     Laboratory examination ordered as part of a routine general medical examination     Callus of foot     Coronary artery disease involving coronary bypass graft of native heart without angina pectoris     S/P CABG (coronary artery bypass graft)     Elevated PSA     Dizziness      Assessment & Plan  Dizziness  Intermittent dizziness likely due to dehydration, orthostatic hypotension, or inner ear issues. Past hypotension noted. Suspected dehydration from low water intake.  -  Increase water intake to 2 liters per day.  - Monitor blood pressure regularly, especially in the morning.  - Review blood test results for potassium and kidney function once available.    Vitamin D Overdose  High-dose vitamin D taken daily instead of weekly, potentially causing dizziness and fatigue. High-dose vitamin D discontinued.  - Discontinue high-dose vitamin D for two weeks.  - Switch to over-the-counter vitamin D at 1,000 IU daily after the two-week break.    Type 2 Diabetes Mellitus  Type 2 diabetes well-controlled with hemoglobin A1c of 6.4%. Rybelsus effective. Previous A1c levels were higher, indicating improvement.  - Continue Rybelsus as prescribed.  - Encourage regular exercise to improve energy levels and aid in weight management.  - Plan for follow-up blood work in July to monitor diabetes and other health parameters.    Calluses and Possible Fungal Infection  Calluses and possible fungal infection on feet, concerning due to diabetes and infection risk.  - Refer to a podiatrist (Dr. Roca) for evaluation and management of calluses and potential fungal infection.    Hypercholesterolemia   -continue medication monitor blood pressure    Coronary artery disease status post CABG   -doing well asymptomatic seeing cardiologist Dr. Ramos    Elevated PSA   -seeing urologist follow-up with them.      General Health Maintenance  Discussed importance of exercise, weight management, and hydration for overall health and diabetes management. Current weight 219 pounds, goal 200 pounds.  - Encourage aerobic exercises such as walking, biking, or using a treadmill.  - Aim for weight reduction to approximately 200 pounds.  - Schedule a comprehensive physical examination in July, including tests for anemia and thyroid function.    Follow-up  Plans for ongoing management and monitoring of health conditions.  - Schedule a follow-up appointment in July for a physical examination and additional blood tests.  - Monitor  for worsening dizziness and report if symptoms persist or worsen.     Orders Placed This Encounter   Procedures    Hemoglobin A1C    Lipid Panel    Microalb/Creat Ratio, Random Urine    Comp Metabolic Panel (14)    Vitamin D    CBC With Differential With Platelet    Urinalysis with Culture Reflex    TSH W Reflex To Free T4       Meds & Refills for this Visit:  Requested Prescriptions      No prescriptions requested or ordered in this encounter     Continue current meds.   Watch diet for fats and carbs.   Stay active.    Do drink more water recommendations to have 2 L of water a day.  Monitor your dizziness.  Schedule appointment with foot specialist.  Do not take vitamin D for 2 weeks , then you can take vitamin D3 1000 units daily.  Do blood test in July prior physical.    Imaging & Consults:  OPHTHALMOLOGY - INTERNAL  PODIATRY - INTERNAL      VISIT SUMMARY:  During your visit, we discussed your dizziness, diabetes management, vitamin D intake, and foot care. We also reviewed your general health maintenance and set goals for hydration, exercise, and weight management.    YOUR PLAN:  -DIZZINESS: Your dizziness is likely due to dehydration, low blood pressure when standing up, or inner ear issues. To help with this, you should increase your water intake to 2 liters per day and monitor your blood pressure regularly, especially in the morning. We will also review your blood test results for potassium and kidney function once they are available.    -VITAMIN D OVERDOSE: Taking too much vitamin D can cause dizziness and fatigue. You should stop taking the high-dose vitamin D for two weeks and then switch to an over-the-counter vitamin D supplement at 1,000 IU daily.    -TYPE 2 DIABETES MELLITUS: Your diabetes is well-controlled with your current medication, Rybelsus, and your hemoglobin A1c level is 6.4%. Continue taking Rybelsus as prescribed, and try to engage in regular exercise to improve your energy levels and help  with weight management. We will do follow-up blood work in July to monitor your diabetes and other health parameters.    -CALLUSES AND POSSIBLE FUNGAL INFECTION: You have calluses and a possible fungal infection on your feet, which is concerning due to the risk of infection with diabetes. You should see a podiatrist, Dr. Loera, for evaluation and management of these issues.    -GENERAL HEALTH MAINTENANCE: We discussed the importance of exercise, weight management, and hydration for your overall health and diabetes management. Your current weight is 219 pounds, and the goal is to reduce it to 200 pounds. Aim to do aerobic exercises like walking, biking, or using a treadmill. We will schedule a comprehensive physical examination in July, including tests for anemia and thyroid function.    INSTRUCTIONS:  Please schedule a follow-up appointment in July for a physical examination and additional blood tests. Monitor your dizziness and report if symptoms persist or worsen.    Contains text generated by [employer]   The patient indicates understanding of these issues and agrees to the plan.  The patient is asked to return in  July 2025.  The note was dictated using speech recognition software.  Accuracy and grammar in transcription may be subject to error..

## 2025-06-05 ENCOUNTER — LAB ENCOUNTER (OUTPATIENT)
Dept: LAB | Age: 65
End: 2025-06-05
Attending: FAMILY MEDICINE
Payer: COMMERCIAL

## 2025-06-05 DIAGNOSIS — E87.5 HYPERKALEMIA: ICD-10-CM

## 2025-06-05 DIAGNOSIS — E78.00 HYPERCHOLESTEROLEMIA: ICD-10-CM

## 2025-06-05 DIAGNOSIS — E11.9 TYPE 2 DIABETES MELLITUS WITHOUT COMPLICATION, WITHOUT LONG-TERM CURRENT USE OF INSULIN (HCC): ICD-10-CM

## 2025-06-05 DIAGNOSIS — Z00.00 LABORATORY EXAMINATION ORDERED AS PART OF A ROUTINE GENERAL MEDICAL EXAMINATION: ICD-10-CM

## 2025-06-05 DIAGNOSIS — E55.9 VITAMIN D DEFICIENCY: ICD-10-CM

## 2025-06-05 LAB
ALBUMIN SERPL-MCNC: 4.4 G/DL (ref 3.2–4.8)
ALBUMIN/GLOB SERPL: 2 {RATIO} (ref 1–2)
ALP LIVER SERPL-CCNC: 83 U/L (ref 45–117)
ALT SERPL-CCNC: 24 U/L (ref 10–49)
ANION GAP SERPL CALC-SCNC: 7 MMOL/L (ref 0–18)
AST SERPL-CCNC: 31 U/L (ref ?–34)
BASOPHILS # BLD AUTO: 0.03 X10(3) UL (ref 0–0.2)
BASOPHILS NFR BLD AUTO: 0.5 %
BILIRUB SERPL-MCNC: 0.8 MG/DL (ref 0.2–1.1)
BILIRUB UR QL: NEGATIVE
BUN BLD-MCNC: 20 MG/DL (ref 9–23)
BUN/CREAT SERPL: 24.1 (ref 10–20)
CALCIUM BLD-MCNC: 9.2 MG/DL (ref 8.7–10.4)
CHLORIDE SERPL-SCNC: 108 MMOL/L (ref 98–112)
CHOLEST SERPL-MCNC: 95 MG/DL (ref ?–200)
CLARITY UR: CLEAR
CO2 SERPL-SCNC: 25 MMOL/L (ref 21–32)
COLOR UR: YELLOW
CREAT BLD-MCNC: 0.83 MG/DL (ref 0.7–1.3)
CREAT UR-SCNC: 148.6 MG/DL
DEPRECATED RDW RBC AUTO: 42.9 FL (ref 35.1–46.3)
EGFRCR SERPLBLD CKD-EPI 2021: 97 ML/MIN/1.73M2 (ref 60–?)
EOSINOPHIL # BLD AUTO: 0.08 X10(3) UL (ref 0–0.7)
EOSINOPHIL NFR BLD AUTO: 1.2 %
ERYTHROCYTE [DISTWIDTH] IN BLOOD BY AUTOMATED COUNT: 13.3 % (ref 11–15)
EST. AVERAGE GLUCOSE BLD GHB EST-MCNC: 140 MG/DL (ref 68–126)
FASTING PATIENT LIPID ANSWER: NO
FASTING STATUS PATIENT QL REPORTED: NO
GLOBULIN PLAS-MCNC: 2.2 G/DL (ref 2–3.5)
GLUCOSE BLD-MCNC: 131 MG/DL (ref 70–99)
GLUCOSE UR-MCNC: NORMAL MG/DL
HBA1C MFR BLD: 6.5 % (ref ?–5.7)
HCT VFR BLD AUTO: 44.6 % (ref 39–53)
HDLC SERPL-MCNC: 36 MG/DL (ref 40–59)
HGB BLD-MCNC: 14.8 G/DL (ref 13–17.5)
IMM GRANULOCYTES # BLD AUTO: 0.02 X10(3) UL (ref 0–1)
IMM GRANULOCYTES NFR BLD: 0.3 %
KETONES UR-MCNC: NEGATIVE MG/DL
LDLC SERPL CALC-MCNC: 45 MG/DL (ref ?–100)
LEUKOCYTE ESTERASE UR QL STRIP.AUTO: NEGATIVE
LYMPHOCYTES # BLD AUTO: 1.89 X10(3) UL (ref 1–4)
LYMPHOCYTES NFR BLD AUTO: 29.1 %
MCH RBC QN AUTO: 29 PG (ref 26–34)
MCHC RBC AUTO-ENTMCNC: 33.2 G/DL (ref 31–37)
MCV RBC AUTO: 87.3 FL (ref 80–100)
MICROALBUMIN UR-MCNC: 11.4 MG/DL
MICROALBUMIN/CREAT 24H UR-RTO: 76.7 UG/MG (ref ?–30)
MONOCYTES # BLD AUTO: 0.68 X10(3) UL (ref 0.1–1)
MONOCYTES NFR BLD AUTO: 10.5 %
NEUTROPHILS # BLD AUTO: 3.8 X10 (3) UL (ref 1.5–7.7)
NEUTROPHILS # BLD AUTO: 3.8 X10(3) UL (ref 1.5–7.7)
NEUTROPHILS NFR BLD AUTO: 58.4 %
NITRITE UR QL STRIP.AUTO: NEGATIVE
NONHDLC SERPL-MCNC: 59 MG/DL (ref ?–130)
OSMOLALITY SERPL CALC.SUM OF ELEC: 294 MOSM/KG (ref 275–295)
PH UR: 5.5 [PH] (ref 5–8)
PLATELET # BLD AUTO: 191 10(3)UL (ref 150–450)
POTASSIUM SERPL-SCNC: 4.8 MMOL/L (ref 3.5–5.1)
PROT SERPL-MCNC: 6.6 G/DL (ref 5.7–8.2)
PROT UR-MCNC: 20 MG/DL
RBC # BLD AUTO: 5.11 X10(6)UL (ref 3.8–5.8)
SODIUM SERPL-SCNC: 140 MMOL/L (ref 136–145)
SP GR UR STRIP: 1.03 (ref 1–1.03)
TRIGL SERPL-MCNC: 60 MG/DL (ref 30–149)
TSI SER-ACNC: 1.52 UIU/ML (ref 0.55–4.78)
UROBILINOGEN UR STRIP-ACNC: NORMAL
VIT D+METAB SERPL-MCNC: 40.9 NG/ML (ref 30–100)
VLDLC SERPL CALC-MCNC: 8 MG/DL (ref 0–30)
WBC # BLD AUTO: 6.5 X10(3) UL (ref 4–11)

## 2025-06-05 PROCEDURE — 82306 VITAMIN D 25 HYDROXY: CPT

## 2025-06-05 PROCEDURE — 81001 URINALYSIS AUTO W/SCOPE: CPT

## 2025-06-05 PROCEDURE — 82043 UR ALBUMIN QUANTITATIVE: CPT

## 2025-06-05 PROCEDURE — 80061 LIPID PANEL: CPT

## 2025-06-05 PROCEDURE — 84443 ASSAY THYROID STIM HORMONE: CPT

## 2025-06-05 PROCEDURE — 85025 COMPLETE CBC W/AUTO DIFF WBC: CPT

## 2025-06-05 PROCEDURE — 83036 HEMOGLOBIN GLYCOSYLATED A1C: CPT

## 2025-06-05 PROCEDURE — 36415 COLL VENOUS BLD VENIPUNCTURE: CPT

## 2025-06-05 PROCEDURE — 80053 COMPREHEN METABOLIC PANEL: CPT

## 2025-06-05 PROCEDURE — 82570 ASSAY OF URINE CREATININE: CPT

## 2025-06-19 PROBLEM — I46.9 CARDIAC ARREST (HCC): Status: ACTIVE | Noted: 2025-01-01

## 2025-06-19 PROBLEM — S22.41XA CLOSED FRACTURE OF MULTIPLE RIBS OF RIGHT SIDE, INITIAL ENCOUNTER: Status: ACTIVE | Noted: 2025-01-01

## 2025-06-19 PROBLEM — I21.29 ST ELEVATION MYOCARDIAL INFARCTION (STEMI) INVOLVING OTHER CORONARY ARTERY (HCC): Status: ACTIVE | Noted: 2025-01-01

## 2025-06-19 PROBLEM — S09.8XXA BLUNT HEAD TRAUMA, INITIAL ENCOUNTER: Status: ACTIVE | Noted: 2025-01-01

## 2025-06-19 NOTE — ED INITIAL ASSESSMENT (HPI)
Patient found unresponsive by wife in kitchen with head injury approximately 20min pta. X4 Epi given by EMS and x3 shocks. Arrives in [facility].

## 2025-06-19 NOTE — ED QUICK NOTES
Late entry,     1732 : Adult full arrest paged by this writer.     RT/ED CHARGE/ PHARM/ / ED MD notified

## 2025-06-20 PROBLEM — R57.0 CARDIOGENIC SHOCK (HCC): Status: ACTIVE | Noted: 2025-01-01

## 2025-06-20 PROBLEM — J96.01 ACUTE RESPIRATORY FAILURE WITH HYPOXIA (HCC): Status: ACTIVE | Noted: 2025-01-01

## 2025-06-20 NOTE — PROGRESS NOTES
Order placed for Giapreza for additional vasopressor support--per Cardiology, Dr Montemayor, request as other provider and RNs were unable to enter order.    MICHOACANO Madrid  Critical Care NP  [facility]  Spect: [phone]  Pgr: [phone]

## 2025-06-20 NOTE — DIETARY NOTE
Clinical Nutrition     Dietitian consult received per cardiac rehab standing order. Pt to be educated by cardiac rehab staff and encouraged to attend outpatient classes taught by YNES. [facility]NES available PRN.    Jannie Champion, YNES, LDN, [facility]  Clinical Dietitian  Spectra: [phone]

## 2025-06-20 NOTE — CONSULTS
[facility] - CARDIOLOGY CONSULT NOTE    Doreen Steiner Patient Status:  Emergency    1960 MRN [id number]   Location [facility] INTERVENTIONAL SUITES Attending No att. providers found   Hosp Day # 0 PCP Avis Weeks MD     Date of Admission:  2025  Date of Consult:  2025  I was asked by No att. providers found to provide recommendations for evaluation and management of cardiac issues.    Reason for Consultation:     Cardiac arrest, STEMI    History of Present Illness:   Patient is a 65 year old male with history of coronary disease status post remote CABG, last year had admission where he underwent left heart cath showing degeneration of a jump graft to a diagonal/OM/PDA his LIMA was patent.  He underwent PCI to his native left circumflex/OM and RCA both  procedures.  Had been doing well.  Today in the afternoon was complaining of some chest pain to his wife, was supposed to go to the emergency room however he lost consciousness fell to the ground, he did not his head.  His wife called EMS was told to start compressions which she did.  He was down for about 8 minutes when they arrived, was found in V-fib arrest status post cardioversion, regained pulse.  Received epi and bicarb.  Had another cardiac arrest PEA and again regained pulse.  Was intubated in the emergency room.  CT head was negative.  EKG showed diffuse ST elevations, inferior ST depressions.        Results:     Lab Results   Component Value Date    WBC 9.0 2025    HGB 10.1 (L) 2025    HCT 32.0 (L) 2025    PLT 93.0 (L) 2025    CREATSERUM 1.49 (H) 2025    BUN 27 (H) 2025     2025    K 4.5 2025     2025    CO2 20.0 (L) 2025     (H) 2025    CA 7.9 (L) 2025    ALB 3.3 2025    ALKPHO 103 2025    BILT 0.6 2025    TP 5.3 (L) 2025     (H) 2025     (H) 2025    PTT 35.0 2025    INR  1.73 (H) 06/19/2025    TSH 1.519 06/05/2025    PSA 4.44 (H) 02/01/2024    MG 2.0 07/10/2024       CT BRAIN OR HEAD (CPT=70450)  Result Date: 6/19/2025  CONCLUSION:  No acute intracranial abnormality.    LOCATION:  [facility]   Dictated by (CST): Chepe Mcintosh MD on 6/19/2025 at 6:09 PM     Finalized by (CST): Chepe Mcintosh MD on 6/19/2025 at 6:12 PM       XR CHEST AP PORTABLE  (CPT=71045)  Result Date: 6/19/2025  CONCLUSION:  Nondisplaced right rib fractures.   LOCATION:  [facility]      Dictated by (CST): Chepe Mcintosh MD on 6/19/2025 at 6:01 PM     Finalized by (CST): Chepe Mcintosh MD on 6/19/2025 at 6:03 PM       EKG 12 Lead  Result Date: 6/19/2025  Undetermined rhythm Left axis deviation LVH with QRS widening ( R in aVL , [place] product ) ST elevation consider anterolateral injury or acute infarct Prolonged QT interval or tu fusion, consider myocardial disease, electrolyte imbalance, or drug effects ** ** ACUTE MI / STEMI ** ** Abnormal ECG When compared with ECG of 18-JUL-2024 16:26, Current undetermined rhythm precludes rhythm comparison, needs review Questionable change in QRS duration Criteria for Inferior infarct are no longer Present      Past Medical History  Past Medical History[1]    Past Surgical History  Past Surgical History[2]    Family History  Family History[3]    Social History  Pediatric History   Patient Parents    Not on file     Other Topics Concern    Caffeine Concern Not Asked    Exercise Not Asked    Seat Belt Not Asked    Special Diet Not Asked    Stress Concern Not Asked    Weight Concern Not Asked   Social History Narrative    Not on file           Current Medications:  Current Hospital Medications[4]  Prescriptions Prior to Admission[5]    Allergies  Allergies[6]    Review of Systems:   10 pt ROS performed, separate from HPI  Unable to obtain  Physical Exam:   Blood pressure (!) 86/69, pulse 101, resp. rate (!) 29, height 70.98\", SpO2 93%.    Scheduled Meds: Scheduled Medications[7]      Physical  Exam:    General: Intubated sedated  HEENT: Normocephalic, anicteric sclera, neck supple  Neck: No JVD, carotids 2+, supple  Cardiac: Regular rate. No pathologic murmur.  Lungs: Mechanical breath sounds  Abdomen: Soft, non-tender. BS-present.  Extremities: Without clubbing, cyanosis.  Peripheral pulsespresent.  Neurologic: Alert and oriented, normal affect. Motor ok.  Skin: Warm and dry.     Imaging: I independently visualized all relevant chest imaging in PACS, agree with radiology interpretation except where noted.  Thank you for allowing me to participate in the care of your patient.    Labs:  HEM:  Recent Labs   Lab 06/19/25  1754   WBC 9.0   HGB 10.1*   PLT 93.0*       Chem:  Recent Labs   Lab 06/19/25  1901      K 4.5      CO2 20.0*   BUN 27*   CREATSERUM 1.49*   CA 7.9*   *       Recent Labs   Lab 06/19/25  1901   *   *   ALB 3.3       No results for input(s): \"TROP\", \"CK\" in the last 168 hours.    Recent Labs   Lab 06/19/25  1753   PTP 20.5*   INR 1.73*       Impression:   1.  Cardiac arrest-V-fib  2.  Global ischemia pattern on EKG with ST segment elevations   3.  Acute hypoxic respiratory failure on mechanical ventilation  3.  Shock-likely cardiogenic  4.  Coronary disease history of prior stenting and CABG  5.  Diabetes mellitus  6.  Hypertension  7.  Hyperlipidemia  8.  Trauma to the back of the head.    Recommendations:  CT head per the ER if this is negative we will bring back to the Cath Lab  Emergent left heart cath for further evaluation of patient's cardiac arrest  Levophed for hemodynamic support  More to follow following emergent left heart cath    C 5    Miles Montemayor MD  [facility]  6/19/2025         [1]   Past Medical History:   Acute, but ill-defined, cerebrovascular disease    Back pain    CAD (coronary artery disease)    2004    Essential hypertension    Heart attack (HCC)    High blood pressure    High cholesterol    Hyperlipidemia     Prediabetes    Stress    Weight gain   [2]   Past Surgical History:  Procedure Laterality Date    Appendectomy      Bypass surgery  2004    x4 , 2004    Cabg      Cath drug eluting stent     [3]   Family History  Problem Relation Age of Onset    Other (etoh) Father     Cancer Mother         lung ca   [4]   Current Facility-Administered Medications   Medication Dose Route Frequency    amiodarone in dextrose 4.14% (Cordarone) 360 mg/200mL infusion premix  1 mg/min Intravenous Continuous    sodium bicarbonate 50 mEq in sodium chloride 0.45% 1,050 mL infusion   Intravenous Continuous    EPINEPHrine (Adrenalin) 5 mg in sodium chloride 0.9% 250 mL infusion  1-10 mcg/min Intravenous Continuous   [5]   Medications Prior to Admission   Medication Sig    ergocalciferol 1.25 MG (47508 UT) Oral Cap Take 1 capsule (50,000 Units total) by mouth once a week. For 12 weeks    RYBELSUS 14 MG Oral Tab Take 1 tablet by mouth daily.    clopidogrel 75 MG Oral Tab Take 1 tablet (75 mg total) by mouth daily.    metoprolol succinate ER 50 MG Oral Tablet 24 Hr Take 1 tablet (50 mg total) by mouth Daily Beta Blocker.    apixaban 5 MG Oral Tab Take 1 tablet (5 mg total) by mouth 2 (two) times daily.    rosuvastatin 20 MG Oral Tab Take 1 tablet (20 mg total) by mouth nightly.   [6] No Known Allergies  [7]

## 2025-06-20 NOTE — PROGRESS NOTES
06/19/25 2152   Vent Information   $ RT Standby Charge (per 15 min) 1   $ Ventilator supplies provided Yes   Vent Initiation Date 06/19/25   Ventilation Day(s) 1   Interface Invasive   Vent Type    Vent plugged into main power? Yes   Vent ID [id number]   Vent Mode VC+   Settings   FiO2 (%) 100 %   Resp Rate (Set) 24   Vt (Set, mL) 600 mL   Waveform Decelerating ramp   PEEP/CPAP (cm H2O) 5 cm H20   Insp Time (sec) 1 sec   Insp Rise Time (%) 50 %   Trigger Sensitivity Flow (L/min) 3 L/min   Humidification Heat and moisture exchanger   Readings   ETCO2 (mmHg) 20 mmHg   Total RR 26   Minute Ventilation (L/min) 19 L/min   Inspiratory Tidal Volume 600 mL   Expiratory Tidal Volume 588 mL   PIP Observed (cm H2O) 26 cm H2O   MAP (cm H2O) 15   I/E Ratio 1:1.8   Plateau Pressure (cm H2O) 16 cm H2O   Alarms   High RR 40   Insp Pressure High (cm H2O) 40 cm H2O   MV High (L/min) 20 L/min   MV Low (L/min) 2 L/min   Apnea Interval (sec) 20 seconds   Apnea Rate 24   Apnea Volume (mL) 600 mL   ETT   Placement Date/Time: 06/19/25 1740   Airway Size: 7.5 mm  Insertion attempts: 1   Secured at (cm) 24 cm   Cuff Pressure (cm H2O) 28 cm H2O   Suctioned? N   Measured From Lips   Secured Location Right   Secured by Commercial tube morrison   Site Condition Dry   Req'd equipment at bedside Bag mask     Received pt intubated and sedated from cath lab @2152. Pt breathing labored. ABG drawn and verified (see results). Pt on VC+/24/600/100%/+5.

## 2025-06-20 NOTE — PROGRESS NOTES
[facility] Pharmacy Dosing Service      Initial Pharmacokinetic Consult for Vancomycin Dosing     Doreen Steiner is a 65 year old male who is being initiated on vancomycin therapy for anImpella.  Pharmacy has been asked to dose vancomycin by Braydon RÍOS.  The initial treatment and monitoring approach will be steady state AUC strategy.        Weight and Temperature:    Wt Readings from Last 1 Encounters:   25 99 kg (218 lb 4.1 oz)        Temp Readings from Last 1 Encounters:   25 98.1 °F (36.7 °C) (Temporal)      Labs:   Recent Labs   Lab 25  1901 25  2238   CREATSERUM 1.49* 1.46*      Estimated Creatinine Clearance: 53 mL/min (A) (based on SCr of 1.46 mg/dL (H)).     Recent Labs   Lab 25  1754 25  2238   WBC 9.0 27.1*          The Pharmacokinetic Target is:     to 600 mg-h/L and trough <=15 mg/L    Renal Dosing Considerations:    CASSANDRA/ARF     Assessment/Plan:   Initial/Loading dose: Will receive 1500 mg IV (15 mg/kg, capped at 2250 mg) x 1 initial dose.      Maintenance dose: Pharmacy will dose vancomycin at 1500 mg IV every 12 hours    Monitorin) Plan for vancomycin peak and trough to be obtained at steady state    2) Pharmacy will order SCr as clinically indicated to assess renal function.    3) Pharmacy will monitor for toxicity and efficacy, adjust vancomycin dose and/or frequency, and order vancomycin levels as appropriate per the Pharmacy and Therapeutics Committee approved protocol until discontinuation of the medication.       We appreciate the opportunity to assist in the care of this patient.     Kevin [facility]  2025  12:04 AM  [facility]  Pharmacy Extension: [phone]

## 2025-06-20 NOTE — PLAN OF CARE
Received pt. From cath lab at 2150 with head wrapped in ace bandage from injury sustained with fall at home when pt. Founf on floor. Vented and sedated with propofol. R groin impella at P9, site oozing with small hematoma palpated. BP labile. Impella power level adjusted according to suction alarms. Amiodarone, levophed, epinephrine and bicarb gtts infusing. Heparin gtt initiated at 100u/hr as ordered. Unable to titrate per impella protocol as ordered as parameters fall out of protocol. Contacted cardiology service during night for titration orders. Vasopressin, giapreza and neosynephrine added in attempts to keep BP 90 or map >60. During night all pressors at max dose. Spiritual care came to bedside for family support. Dr. Montemayor came in at 0200 and spoke with family regarding critical and poor prognosis. Wife and grandson understand there are no further resources to offer and pt. Will likely pass. Wife is hopeful that their daughter and son to get here before that happens. Pt.  at 0642 with his wife, daughter and son at bedside.  and MDs notified.

## 2025-06-20 NOTE — ED PROVIDER NOTES
Patient Seen in: [facility]        History  Chief Complaint   Patient presents with    Cardiac Arrest     Stated Complaint: arrest    Subjective:   HPI            The patient is a 65-year-old male who presents emergency room with a history of being found unresponsive by his wife at home and paramedics were called and the patient was found to have a V-fib arrest.  Patient's wife  who gave independent history states the patient was having some chest pain earlier today and she wanted to go to the hospital to get checked out but he refused to go.  The patient reportedly was in the kitchen when she heard him fall to the ground and was found to be in cardiac arrest.  When paramedics arrived the patient states that the patient was in V-fib he had received 4 doses of epinephrine also received multiple shocks and also received 1 amp of sodium bicarb and was given 300 mg of amiodarone and went into PEA.  The patient did have a supraglottic airway placed and was brought into the ER in PEA as per paramedics.  The patient also apparently hit his head when he fell he is on Eliquis as per review of medical records.  He is also on Plavix.  There is no other history available at this time.    Objective:     No pertinent past medical history.            No pertinent past surgical history.              No pertinent social history.                              Physical Exam    ED Triage Vitals   BP 06/19/25 1743 98/76   Pulse 06/19/25 1743 66   Resp 06/19/25 1743 26   Temp --    Temp src --    SpO2 06/19/25 1746 92 %   O2 Device 06/19/25 1805 Ventilator       Current Vitals:   Vital Signs  BP: (!) 86/69  Pulse: 101  Resp: (!) 29  MAP (mmHg): 77    Oxygen Therapy  SpO2: 93 %  O2 Device: Ventilator  FiO2 (%): 100 %            Physical Exam     GENERAL: Well-developed, well-nourished male who arrives to the emergency room with agonal respirations and a thready carotid pulse.  HEENT: Head is normocephalic.    Pupils are 3 mm round and reactive to light.  There is no evidence of any intraoral pathology appreciated.  There is a supraglottic airway in place.  The patient does have a small contusion/abrasion to the occipital scalp.    NECK:  No stridor.  LUNGS: Diminished breath sounds at the bases with assisted respirations here in the ER   HEART: Regular rate and rhythm with ectopic beats appreciated.. Normal S1, S2 no S3, or S4. No murmur.  CHEST: There is some mild bruising over the right side of the chest wall.  There is no overlying crepitance appreciated.  ABDOMEN: There is no focal tenderness to palpation appreciated anywhere throughout the abdomen. There is no guarding, no rebound, no mass, and no organomegaly appreciated. There is normoactive bowel sounds.   EXTREMITIES: There is no cyanosis, clubbing, or edema appreciated. Pulses are 2+ and equal in all 4 extremities.  NEURO: Patient is unresponsive here in the ER with agonal respirations appreciated he is not moving purposefully at this time.  Patient's gag reflex is minimal at this time.  Further neurologic assessment cannot be accurately assessed at this time.          ED Course  Labs Reviewed   CBC WITH DIFFERENTIAL WITH PLATELET - Abnormal; Notable for the following components:       Result Value    RBC 3.35 (*)     HGB 10.1 (*)     HCT 32.0 (*)     PLT 93.0 (*)     All other components within normal limits   TROPONIN I HIGH SENSITIVITY - Abnormal; Notable for the following components:    Troponin I (High Sensitivity) 82 (*)     All other components within normal limits   PROTHROMBIN TIME (PT) - Abnormal; Notable for the following components:    PT 20.5 (*)     INR 1.73 (*)     All other components within normal limits   ABG PANEL W ELECT AND LACTATE - Abnormal; Notable for the following components:    ABG pH 7.20 (*)     ABG HCO3 14.4 (*)     ABG Base Excess -13.4 (*)     Sodium Blood Gas 134 (*)     Lactic Acid (Blood Gas) 9.2 (*)     All other components  within normal limits   MANUAL DIFFERENTIAL - Abnormal; Notable for the following components:    Metamyelocyte Absolute Manual 0.09 (*)     NRBC 3 (*)     RBC Morphology See morphology below (*)     All other components within normal limits   PTT, ACTIVATED - Normal   COMP METABOLIC PANEL (14)   LACTIC ACID, PLASMA   LIPID PANEL   RAINBOW DRAW LAVENDER   RAINBOW DRAW LIGHT GREEN   RAINBOW DRAW BLUE   RAINBOW DRAW GOLD     EKG    Rate, intervals and axes as noted on EKG Report.  Rate: 88  Rhythm: Sinus Rhythm  Reading: Evidence of ST elevation infarction              CT BRAIN OR HEAD (CPT=70450)  Result Date: 6/19/2025  CONCLUSION:  No acute intracranial abnormality.    LOCATION:  [facility]   Dictated by (CST): Chepe Mcintosh MD on 6/19/2025 at 6:09 PM     Finalized by (CST): Chepe Mcintosh MD on 6/19/2025 at 6:12 PM       XR CHEST AP PORTABLE  (CPT=71045)  Result Date: 6/19/2025  CONCLUSION:  Nondisplaced right rib fractures.   LOCATION:  [facility]      Dictated by (CST): Chepe Mcintosh MD on 6/19/2025 at 6:01 PM     Finalized by (CST): Chepe Mcintosh MD on 6/19/2025 at 6:03 PM                         MDM     The patient arrived to the emergency room in PEA initially and then developed a pulse that was palpable and was given IV fluids in the ER.  The patient had a supraglottic airway in place which was removed by myself upon the patient's arrival to the ER and the patient was intubated by myself with a 7.5 ET tube with visualization of the tube passing through the cords and confirmation of tube placement by end-tidal CO2 monitoring as well as chest x-ray.  The patient had blood work drawn showed a troponin of 82.  Showed pH 7.20 with a bicarb of 14.4 for which the patient was given amp of sodium bicarb prior to this ABG as the patient was taken quickly to the cardiac Cath Lab from the emergency room.  INR found to be 1.73.  White count of 5-9.0 hemoglobin 10.1.  The patient did need a repeat draw of electrolytes as per my discussion  with lab who did call to cardiac Cath Lab patient is need for repeat to notify them of the blood draw as the first blood draw was contaminated.  A cardiac alert was called quickly from the ER the patient was given IV fluid with improvement in blood pressure after this was given was given an amp of sodium bicarb was also given amiodarone as a drip here in the ER as the patient was previously given amiodarone bolus in the field.  Patient is on Eliquis he did go for emergent CT scan of the head as the patient did hit his head which was found to be negative for any intracerebral bleed.  The patient was seen and evaluated by Dr. Montemayor from cardiology here in the emergency room and the decision was made to take the patient emergently to cardiac Cath Lab for plan for intervention at this time based on the patient's cardiac arrest as well as the patient's EKG findings and previous coronary history from last summer.  Patient's lactic acid was found to be 9.2 on the ABG after the patient had already gone to cardiac Cath Lab.  The patient reportedly had CPR started approximately 45 minutes prior to arrival in the ER as per paramedics.  Patient did go directly to cardiac Cath Lab with cardiology as noted above.  The patient's case was discussed with the [facility]ist.  Patient was seen and evaluated by cardiology at the bedside and the  decision was made to send the patient directly to cardiac Cath Lab at this time.  Patient will be further disposition based on treatment and cardiac Cath Lab.      19:44 review of repeat blood work shows glucose of 352 electrolytes that are unremarkable except for bicarb of 20 BUN/creatinine 27 and 1.49.  Patient liver function tests are elevated    Admission disposition: 6/19/2025  6:14 PM           Medical Decision Making      Disposition and Plan     Clinical Impression:  1. Cardiac arrest (HCC)    2. ST elevation myocardial infarction (STEMI) involving other coronary artery (HCC)    3.  Blunt head trauma, initial encounter    4. Closed fracture of multiple ribs of right side, initial encounter         Disposition:  Admit  6/19/2025  6:14 pm    Follow-up:  No follow-up provider specified.        Medications Prescribed:  Current Discharge Medication List                Supplementary Documentation:         Hospital Problems       Present on Admission  Date Reviewed: 4/12/2025          ICD-10-CM Noted POA    * (Principal) Cardiac arrest (HCC) I46.9 6/19/2025 Unknown    Blunt head trauma, initial encounter S09.8XXA 6/19/2025 Unknown    Closed fracture of multiple ribs of right side, initial encounter S22.41XA 6/19/2025 Unknown    ST elevation myocardial infarction (STEMI) involving other coronary artery ([facility]) I21.29 6/19/2025 Unknown

## 2025-06-20 NOTE — PLAN OF CARE
Pt had already  upon change of shift.  Family undecided upon  home at the time.  Consent signed by wife for release of body when appropriate.  ALEXIS and  notified.  Body taken to [facility].

## 2025-06-20 NOTE — PROGRESS NOTES
Patient from cath lab last night around s/p aborted PCI with Impella. Overnight, pt with profound hypotension-on pressors (levo, isidoro, epi, vasopressin, and angiotensin II).     This morning hemoglobin 4.8. Per RN, back of head is bleeding and groin is oozing. One unit PRBCs to be transfused once type/screen is resulted. Notified by RN pupils are unequal. Right pupil is 1 mm larger than the left pupil-reactive but sluggish per RN.     Overnight Dr. Montemayor updated on changes in condition and at bedside to assess pt and discuss prognosis and plan of care with wife.

## 2025-06-20 NOTE — PROCEDURES
[facility]   part of [facility]    Cardiac Catheterization Note    Primary Proceduralist: Miles Montemayor MD  Procedure Performed: LHC, RHC, COR, and POBA of the left circumflex/OM, Ashley placement, Impella placement  Date of Procedure: 6/19/2025   Indication: Cardiac arrest, V-fib arrest, STEMI  Pre Operative Diagnosis: Cardiac arrest, V-fib arrest, STEMI  Post Operative Diagnosis: Cardiogenic shock  Estimated blood loss: Less than 5  Specimens: None    Consent:   Informed written consent was obtained from the patient after risk benefits and alternative explained the patient.  Patient agreed to proceed    Sedation  IV conscious sedation was achieved with Versed and fentanyl.  It was administered under my direct supervision.  Hemodynamic monitoring took place throughout the entire procedure by myself in the Cath Lab staff.  2 mg of Versed and 50 mcg of Fentanyl were given.  Start Time: 1836 end Time: 2130      Description of the procedure: After written informed consent was obtained from the patient, patient was brought to the cardiac catheterization laboratory in a stable condition and fasting state.  Patient was prepped and draped in the usual sterile fashion.  IV conscious sedation was achieved with Versed and fentanyl.  Lidocaine 1% was used to infiltrate the right common femoral artery, micropuncture ultrasound used and 6 Scottish sheath was placed.  Diagnostic angiography was performed using a JL 4, JR4, LIMA catheter.  Pigtail was advanced to the LV for hemodynamic pressures as well as left ventriculogram.  Intervention was performed on an occluded left circumflex stent see results below.  Following this a right heart cath was performed via 8.5 Scottish sheath placed in the right groin.  This 1 was Lefton see results below.  The access site was then preclosed x 2, a stiff wire was placed in the ascending aorta, the arteriotomy site was dilated with a 8 Scottish, 10 Scottish, 12 Scottish  dilator then the Impella sheath was placed.  A pigtail was advanced to the LV, the Impella wire was then placed in the LV followed by advancing the Impella into the LV.  The peel-away sheath was then removed and the Impella was sutured in.  Patient was hemodynamically stable throughout the case on norepinephrine drip, towards the end of the case is blood pressures started to deteriorate, needing to uptitrate his norepinephrine after Impella placement.  We then started low-dose epinephrine drip.  His MAP stabilized in the 70s.  He was also started on amiodarone for his prior VF episode this had been running since the emergency room.  He was started on bicarb drip.  His blood gas was 7.2, CO2 was 35, pO2 95.  His vent settings were adjusted.  During the case he was also given Integrilin bolus and drip, towards the end of the case we noticed worsening scalp hematoma therefore Integrilin was stopped.  Neurosurgery was consulted and pressure was held on the hematoma and a pressure dressing was placed.    Coronary Angiogram Findings:  LM: Large caliber artery giving rise to LAD & LCX.  20-30% disease  LAD: Large caliber artery giving rise to diagonal and septal branches.  Occluded at the ostium  LCX: Medium to large caliber artery giving rise to OM branches.  Proximal left circumflex with 20 to 30% stenosis, there is a large OM1 with 40% proximal stenosis, the AV groove circumflex which was previously stented is now occluded likely the culprit for patient's presentation.  RCA: Large caliber artery giving rise to acute marginal branches, and bifurcates into RPDA & RPL.  Prior stents from the distal to proximal vessel, there is mild ISR in the mid segment, the PDA is patent with 30 to 40% ostial disease, the PL branch is also patent.  SVG to diagonal, to OM, to PDA-severe proximal to mid disease degenerated throughout the stent has a severe 99% stenosis.  There is no outflow to the bypass vessels  LIMA to LAD-no significant  disease    Hemodynamics:  LVEDP 23 mmHg  No significant gradient upon Ao-LV pullback  LVEF 2025%, anterior and inferior wall hypokinesis    RHC:   RA: 25  RV: 64/29  PA: 63/38/52  Wedge: 39    PA saturation was 47.3  AO saturation was 92.9    Cardiac output was 3.3  Cardiac index is 1.5  PAP is 1.0    Intervention:  Lesion 1: In-stent thrombosis of the left circumflex and OM stent with poor distal runoff this is likely the culprit of patient's presentation.  A EBU 3.75 guide was placed over a wire and engaged the left main.  Initially tried to wire with a Kristina blue however it was difficult to pass into the distal vessel therefore with the help of a Corsair we attempted wire escalation with a Kristina black,  200, Tammy Boo without success we were never able to fully advance the wire past the distal stent.  On review of prior angiogram this territory small to moderate.  We did balloon the proximal OM with a 2.0  balloon.  However we are never able to reestablish distal outflow.  Therefore the case was aborted plan was to manage this with Integrilin and heparin.      Monitored sedation administered by the cath lab RN, and supervised throughout the procedure by myself. Total time 294 minutes.     Closure: Impella left in place    No immediate complications.  Critically ill patient following cardiac arrest.    A/P:  Cardiac arrest secondary to V-fib, likely secondary to occluded OM stent.  Attempt made to wire into the distal vessel was unsuccessful despite wire escalation and flow was not reestablished into the OM.  Patient was started on heparin and Integrilin though Integrilin had to be stopped at the end of the case due to scalp hematoma.  Cardiogenic shock-cardiac index was 1.5, joseph was 1, lactate initially elevated post ROSC with a pH of 7.2.  Impella was placed, pressor support with norepinephrine at 25 mcg and epi at 0.4 mcg his MAP is stable between 65 and 75.  Impella is at P9.  Elevated left and right  sided filling pressures-when patient returns to the floor monitor his urine output and plan for some low-dose Lasix   Continue heparin drip per Impella protocol, if scalp hematoma improves would favor restarting Integrilin  Check echocardiogram  Continue Plavix, aspirin, hold Eliquis  Continue pressor support wean as tolerated  Monitor in the CCU closely    Patient is critically ill and prognosis is guarded.    Miles Montemayor MD  Interventional Cardiology  [facility]

## 2025-06-20 NOTE — PROGRESS NOTES
Brief clinical update:  Patient post out-of-hospital cardiac arrest with CPR greater than 20 minutes, multiple epi injections and shocks for V-fib arrest.  Achieved ROSC EKG showed global ischemic pattern.  CT head did not show any intracranial bleeding though there was a small scalp laceration.  He was taken to the Cath Lab where angiogram revealed occlusion of the left circumflex stent, attempt was made to revascularize this vessel however unsuccessful.  Right heart cath showed patient was in cardiogenic shock and Impella was placed.  Initial blood gas in the Cath Lab showed pH of 7.2, lactate was 9.  Patient escalating pressor requirements towards the end of the case..  On arrival to the floor he was on Impella P9 support as well as epi and nor epi at high doses and still had marginal blood pressure.  However given severe shock and the early stages of  multiorgan failure as well as vasoplegia he continue to require escalating doses.  Angiotensin II and Cyano pack were utilized to help with vasoplegia this briefly helped however pressures requirements continue to escalate.  His pH began to drop and his lactate began to rise.  I discussions with the family overnight stating that his prognosis was poor especially given out-of-hospital cardiac arrest with some degree of downtime.  We discussed the possibility of ECMO with CV surgery however given his poor prognosis and end-stage shock we do not think that this will change his long-term outcome.  Patient eventually maxed out on multiple pressors as well as Impella and continued on marginal blood pressures and eventually passed this morning.

## 2025-06-20 NOTE — H&P
[facility]     Doreen Steiner Patient Status:  Inpatient    1960 MRN [id number]   Location [facility] 6NE-A Attending Christian Garcia MD   Hosp Day # 0 PCP Avis Weeks MD     Chief Complaint: Cardiac arrest     Subjective:    History of Present Illness:     Doreen Steiner is a 65 year old male with past medical history of CAD, hypertension, hyperlipidemia who presents to the ED for cardiac arrest.  Patient began having chest pain earlier today.  Patient's wife wanted to go to the hospital to get checked out but patient refused.  He was in the kitchen later on in the day when patient's wife heard patient fall to the ground.  Paramedics were called and patient was found to have be in V-fib arrest.  Patient received 4 doses of epinephrine, 1 amp of sodium bicarb, amiodarone 300 mg, and multiple shocks.  Patient went into PEA afterwards.  Patient had supraglottic airway placed and was brought to ER in PEA.  Patient also hit his head when he fell.  He is on Eliquis and Plavix at home.    History/Other:    Past Medical History:  Past Medical History[1]  Past Surgical History:   Past Surgical History[2]   Family History:   Family History[3]  Social History:    reports that he has never smoked. He has never used smokeless tobacco. He reports that he does not drink alcohol and does not use drugs.     Allergies: Allergies[4]    Medications:  Medications Ordered Prior to Encounter[5]    Review of Systems:   A comprehensive review of systems was completed.    Pertinent positives and negatives noted in the HPI.    Objective:   Physical Exam:    BP (!) 80/57   Pulse 97   Resp 16   Ht 5' 10.98\" (1.803 m)   Wt 218 lb 4.1 oz (99 kg)   SpO2 95%   BMI 30.45 kg/m²   General: No acute distress  HEENT: Scalp abrasion  Respiratory: Intubated  Cardiovascular: S1, S2. Regular rate and rhythm  Abdomen: Soft, Non-tender, non-distended, positive bowel sounds  Neuro: Sedated  Extremities:  No edema    Results:    Labs:      Labs Last 24 Hours:    Recent Labs   Lab 06/19/25  1754 06/19/25  2238   RBC 3.35* 3.40*   HGB 10.1* 10.2*   HCT 32.0* 33.1*   MCV 95.5 97.4   MCH 30.1 30.0   MCHC 31.6 30.8*   RDW 13.8 14.0   NEPRELIM 2.72 22.46*   WBC 9.0 27.1*   PLT 93.0* 184.0       Recent Labs   Lab 06/19/25  1901 06/19/25  2238   * 291*   BUN 27* 21   CREATSERUM 1.49* 1.46*   EGFRCR 51* 53*   CA 7.9* 7.5*   ALB 3.3 2.2*    142   K 4.5 4.2    112   CO2 20.0* <10.0*   ALKPHO 103 89   * 478*   * 395*   BILT 0.6 0.7   TP 5.3* 4.2*       Estimated Glomerular Filtration Rate: 53 mL/min/1.73m2 (A) (result from lab).    Lab Results   Component Value Date    INR 1.73 (H) 06/19/2025       Recent Labs   Lab 06/19/25  1754   TROPHS 82*       No results for input(s): \"TROP\", \"PBNP\" in the last 168 hours.    No results for input(s): \"PCT\" in the last 168 hours.    Imaging: Imaging data reviewed in Epic.    Assessment & Plan:      #V-fib cardiac arrest  #Cardiogenic shock  #STEMI  #History of CAD s/p CABG, PCI  - Admit to CCU  - S/p emergent cardiac cath  - S/p Impella placement  - Echo ordered  - Aspirin, Plavix  - Started on heparin and Integrilin in Cath Lab, Integrilin discontinued at the end of case due to scalp hematoma  - On pressors  - Heparin drip  - Cardiology following     #Acute hypoxic respiratory failure  - Intubated in ER  - On vent    #Fall  #Scalp abrasion  - CT head shows no acute findings    #Hypertension  - Metoprolol on hold    #Hyperlipidemia  - Statin on hold    #Diabetes type 2  - Not on home meds       Plan of care discussed with patient     Marsha Loya DO    Supplementary Documentation:     The 21st Century Cures Act makes medical notes like these available to patients in the interest of transparency. Please be advised this is a medical document. Medical documents are intended to carry relevant information, facts as evident, and the clinical opinion of the  practitioner. The medical note is intended as peer to peer communication and may appear blunt or direct. It is written in medical language and may contain abbreviations or verbiage that are unfamiliar.                                       [1]   Past Medical History:   Acute, but ill-defined, cerebrovascular disease    Back pain    CAD (coronary artery disease)    2004    Essential hypertension    Heart attack (HCC)    High blood pressure    High cholesterol    Hyperlipidemia    Prediabetes    Stress    Weight gain   [2]   Past Surgical History:  Procedure Laterality Date    Appendectomy      Bypass surgery  2004    x4 , 2004    Cabg      Cath drug eluting stent     [3]   Family History  Problem Relation Age of Onset    Other (etoh) Father     Cancer Mother         lung ca   [4] No Known Allergies  [5]   No current facility-administered medications on file prior to encounter.     Current Outpatient Medications on File Prior to Encounter   Medication Sig Dispense Refill    ergocalciferol 1.25 MG (86672 UT) Oral Cap Take 1 capsule (50,000 Units total) by mouth once a week. For 12 weeks 12 capsule 0    RYBELSUS 14 MG Oral Tab Take 1 tablet by mouth daily.      clopidogrel 75 MG Oral Tab Take 1 tablet (75 mg total) by mouth daily. 30 tablet 3    metoprolol succinate ER 50 MG Oral Tablet 24 Hr Take 1 tablet (50 mg total) by mouth Daily Beta Blocker. 30 tablet 3    apixaban 5 MG Oral Tab Take 1 tablet (5 mg total) by mouth 2 (two) times daily. 60 tablet 0    rosuvastatin 20 MG Oral Tab Take 1 tablet (20 mg total) by mouth nightly.

## 2025-06-21 NOTE — DISCHARGE SUMMARY
[facility]     Doreen Steiner Patient Status:  Inpatient    1960 MRN [id number]   Location [facility] 6NE-A Attending No att. providers found   Hosp Day # 1 PCP Avis Weeks MD     Date of Admission: 2025  Date of Discharge:  2025     Discharge Disposition:     Discharge Diagnosis:  #V-fib cardiac arrest  #Cardiogenic shock  #STEMI  #History of CAD s/p CABG, PCI  - Admit to CCU  - S/p emergent cardiac cath  - S/p Impella placement  - Echo ordered  - Aspirin, Plavix  - Started on heparin and Integrilin in Cath Lab, Integrilin discontinued at the end of case due to scalp hematoma  - On pressors  - Heparin drip  - Cardiology following      #Acute hypoxic respiratory failure  - Intubated in ER  - On vent     #Fall  #Scalp abrasion  - CT head shows no acute findings     #Hypertension  - Metoprolol on hold     #Hyperlipidemia  - Statin on hold     #Diabetes type 2  - Not on home meds     History of Present Illness: Doreen Steiner is a 65 year old male with past medical history of CAD, hypertension, hyperlipidemia who presents to the ED for cardiac arrest.  Patient began having chest pain earlier today.  Patient's wife wanted to go to the hospital to get checked out but patient refused.  He was in the kitchen later on in the day when patient's wife heard patient fall to the ground.  Paramedics were called and patient was found to have be in V-fib arrest.  Patient received 4 doses of epinephrine, 1 amp of sodium bicarb, amiodarone 300 mg, and multiple shocks.  Patient went into PEA afterwards.  Patient had supraglottic airway placed and was brought to ER in PEA.  Patient also hit his head when he fell.  He is on Eliquis and Plavix at home.        Brief Synopsis: Patient underwent emergent cath and impella placement. He was admitted to CCU. He had worsening hypotension and required multiple pressors. Lactate began to rise. Hemoglobin dropped to 4.8 and one unit pRBC  was ordered. Cardiologist Dr. Montemayor discussed severe shock and multiorgan failure along with poor prognosis with patient's family. Patient's family understood patient's prognosis. Patient passed away at 0642 on .     Lace+ Score: 66  59-90 High Risk  29-58 Medium Risk  0-28   Low Risk       Procedures during hospitalization:   Cardiac cath  Impella placement     Incidental or significant findings and recommendations (brief descriptions):  See brief synopsis above     Lab/Test results pending at Discharge:   None    Consultants:  Cardiology   Critical care     Discharge Medication List:     Discharge Medications        ASK your doctor about these medications        Instructions Prescription details   rosuvastatin 20 MG Tabs  Commonly known as: Crestor      Take 1 tablet (20 mg total) by mouth nightly.   Refills: 0     Rybelsus 14 MG Tabs  Generic drug: Semaglutide      Take 1 tablet by mouth daily.   Refills: 0              [place]    Follow-up appointment:   No follow-up provider specified.  Appointments for Next 30 Days 2025 - 2025      None            -----------------------------------------------------------------------------------------------  PATIENT DISCHARGE INSTRUCTIONS: See electronic chart    Marsha Loya DO    Total time spent on discharge plannin minutes     The  [employer] Cures Act makes medical notes like these available to patients in the interest of transparency. Please be advised this is a medical document. Medical documents are intended to carry relevant information, facts as evident, and the clinical opinion of the practitioner. The medical note is intended as peer to peer communication and may appear blunt or direct. It is written in medical language and may contain abbreviations or verbiage that are unfamiliar.

## 2025-06-25 NOTE — PROGRESS NOTES
Physician Clarification  Additional information related to the patient's hemoglobin values:     Anemia due to acute blood loss       This note is part of the patient's medical record.

## 2025-06-25 NOTE — PROGRESS NOTES
Physician Clarification  Additional information related to the patient's creatinine values:    Acute Kidney Injury      This note is part of the patient's medical record.

## 2025-06-25 NOTE — PROGRESS NOTES
Provider Clarification  Additional information on the diagnosis of right sided rib fractures:     Right sided Rib fractures related to/associated with CPR      This note is part of the patient's medical record.
